# Patient Record
Sex: FEMALE | Race: WHITE | NOT HISPANIC OR LATINO | Employment: PART TIME | URBAN - METROPOLITAN AREA
[De-identification: names, ages, dates, MRNs, and addresses within clinical notes are randomized per-mention and may not be internally consistent; named-entity substitution may affect disease eponyms.]

---

## 2017-01-13 ENCOUNTER — ROUTINE PRENATAL (OUTPATIENT)
Dept: OBSTETRICS AND GYNECOLOGY | Facility: CLINIC | Age: 32
End: 2017-01-13

## 2017-01-13 ENCOUNTER — PROCEDURE VISIT (OUTPATIENT)
Dept: OBSTETRICS AND GYNECOLOGY | Facility: CLINIC | Age: 32
End: 2017-01-13

## 2017-01-13 VITALS — SYSTOLIC BLOOD PRESSURE: 110 MMHG | WEIGHT: 158 LBS | DIASTOLIC BLOOD PRESSURE: 60 MMHG | BODY MASS INDEX: 25.12 KG/M2

## 2017-01-13 VITALS — BODY MASS INDEX: 25.12 KG/M2 | SYSTOLIC BLOOD PRESSURE: 110 MMHG | DIASTOLIC BLOOD PRESSURE: 60 MMHG | WEIGHT: 158 LBS

## 2017-01-13 DIAGNOSIS — O09.293 HISTORY OF SHOULDER DYSTOCIA IN PRIOR PREGNANCY, CURRENTLY PREGNANT IN THIRD TRIMESTER: ICD-10-CM

## 2017-01-13 DIAGNOSIS — O09.892 SHORT INTERVAL BETWEEN PREGNANCIES AFFECTING PREGNANCY IN SECOND TRIMESTER, ANTEPARTUM: ICD-10-CM

## 2017-01-13 DIAGNOSIS — Z98.891 PREVIOUS CESAREAN SECTION: ICD-10-CM

## 2017-01-13 DIAGNOSIS — Z34.92 ENCOUNTER FOR PREGNANCY RELATED EXAMINATION IN SECOND TRIMESTER: Primary | ICD-10-CM

## 2017-01-13 DIAGNOSIS — Z36.89 SCREENING, ANTENATAL, FOR FETAL ANATOMIC SURVEY: Primary | ICD-10-CM

## 2017-01-13 DIAGNOSIS — Z13.9 SCREENING: ICD-10-CM

## 2017-01-13 LAB
BILIRUB BLD-MCNC: NEGATIVE MG/DL
CLARITY, POC: CLEAR
COLOR UR: YELLOW
EXTERNAL GENETIC TESTING, MATERNAL BLOOD: NORMAL
GLUCOSE UR STRIP-MCNC: NEGATIVE MG/DL
KETONES UR QL: NEGATIVE
LEUKOCYTE EST, POC: ABNORMAL
NITRITE UR-MCNC: NEGATIVE MG/ML
PH UR: 7 [PH] (ref 5–8)
PROT UR STRIP-MCNC: NEGATIVE MG/DL
RBC # UR STRIP: NEGATIVE /UL
SP GR UR: 1.02 (ref 1–1.03)
UROBILINOGEN UR QL: NORMAL

## 2017-01-13 PROCEDURE — 76805 OB US >/= 14 WKS SNGL FETUS: CPT | Performed by: OBSTETRICS & GYNECOLOGY

## 2017-01-13 PROCEDURE — 81002 URINALYSIS NONAUTO W/O SCOPE: CPT | Performed by: OBSTETRICS & GYNECOLOGY

## 2017-01-13 PROCEDURE — 0502F SUBSEQUENT PRENATAL CARE: CPT | Performed by: OBSTETRICS & GYNECOLOGY

## 2017-01-13 NOTE — PROGRESS NOTES
See scanned ultrasound report.   17 weeks 4 days  Lo intrauterine pregnancy  Anatomy completed and normal  Gender undetermined at patient's request, sonographer reports normal genitalia  Abdominal circumference 77th percentile    Ann Mitchell MD

## 2017-01-13 NOTE — MR AVS SNAPSHOT
Candace Mustafa   2017 9:30 AM   Appointment    Dept Phone:  [phone]   Encounter #:  [id number]    Provider:  EARNEST ACKERMAN [place]   Department:  [facility]                Your Full Care Plan              Your Updated Medication List          This list is accurate as of: 17 10:08 AM.  Always use your most recent med list.                Prenatal Vitamins 28-0.8 MG tablet               Instructions     None    Patient Instructions History      Upcoming Appointments     Visit Type Date Time Department    ULTRASOUND 2017  9:30 AM [facility] [place]    OB FOLLOWUP 2017 10:15 AM [facility] [place]      MyChart Signup     [facility] [employer] allows you to send messages to your doctor, view your test results, renew your prescriptions, schedule appointments, and more. To sign up, go to [employer] and click on the Sign Up Now link in the New User? box. Enter your Easy Met[employer] Activation Code exactly as it appears below along with the last four digits of your Social Security Number and your Date of Birth () to complete the sign-up process. If you do not sign up before the expiration date, you must request a new code.    Easy [employer] Activation Code: [email or web address]  Expires: 2017  5:38 AM    If you have questions, you can email [email or web address] or call [phone] to talk to our Easy Metrics staff. Remember, Easy Metrics is NOT to be used for urgent needs. For medical emergencies, dial 911.               Other Info from Your Visit           Your Appointments     2017 10:15 AM EST   OB FOLLOWUP with Ann Mitchell MD   [facility] (--)    [address], [address]  [facility] [address] [phone]              Other Notes About Your Plan     Blood transfusion acceptable    Latex allergy no    Chart Epic    Flu vaccine no    Genetic screening decliined     Tdap:   04.6.16    Gender:  Male           Allergies     Sulfa Antibiotics  Rash      Vital Signs     Last Menstrual Period Smoking Status                09/12/2016 Never Smoker

## 2017-01-13 NOTE — PROGRESS NOTES
Cc/spider veins rt leg/sc  31-year-old  5 para 3013 at 17 weeks 4 days  Ultrasound for anatomy today completed and normal  Gender is secret  Previous  section for history of shoulder dystocia, plans repeat  Doing well good fetal movement  Follow-up 4 weeks  Requesting repeat  with Dr. Glendy Ritter

## 2017-01-13 NOTE — MR AVS SNAPSHOT
Candace Mustafa   2017 10:15 AM   Routine Prenatal    Dept Phone:  [phone]   Encounter #:  [id number]    Provider:  Ann Mitchell MD   Department:  [facility]                Your Full Care Plan              Your Updated Medication List          This list is accurate as of: 17 11:22 AM.  Always use your most recent med list.                Prenatal Vitamins 28-0.8 MG tablet               We Performed the Following     Maternal Screen 4     POC Urinalysis Dipstick       You Were Diagnosed With        Codes Comments    Encounter for pregnancy related examination in second trimester    -  Primary ICD-10-CM: Z34.82  ICD-9-CM: V22.1     History of shoulder dystocia in prior pregnancy, currently pregnant in third trimester     ICD-10-CM: O09.293  ICD-9-CM: V23.49     Previous  section     ICD-10-CM: Z98.891  ICD-9-CM: V45.89     Short interval between pregnancies affecting pregnancy in second trimester, antepartum     ICD-10-CM: O09.892  ICD-9-CM: V23.89     Screening     ICD-10-CM: Z13.9  ICD-9-CM: V82.9       Instructions     None    Patient Instructions History      Upcoming Appointments     Visit Type Date Time Department    [place]ASOUND 2017  9:30 AM [facility]    OB FOLLOWUP 2017 10:15 AM [facility] [employer] Signup     [facility] [employer] allows you to send messages to your doctor, view your test results, renew your prescriptions, schedule appointments, and more. To sign up, go to [employer] and click on the Sign Up Now link in the New User? box. Enter your Company[employer] Activation Code exactly as it appears below along with the last four digits of your Social Security Number and your Date of Birth () to complete the sign-up process. If you do not sign up before the expiration date, you must request a new code.    Company[employer] Activation Code: [email or web address]  Expires: 2017  5:38  AM    If you have questions, you can email [email or web address] or call [phone] to talk to our MyChart staff. Remember, MyChart is NOT to be used for urgent needs. For medical emergencies, dial 911.               Other Info from Your Visit           Other Notes About Your Plan     Blood transfusion acceptable    Latex allergy no    Chart Epic    Flu vaccine no    Genetic screening decliined     Tdap:  16    Gender:  Male           Allergies     Sulfa Antibiotics  Rash      Reason for Visit     Routine Prenatal Visit           Vital Signs     Blood Pressure Weight Last Menstrual Period Body Mass Index Smoking Status       110/60 158 lb (71.7 kg) 2016 25.12 kg/m2 Never Smoker       Problems and Diagnoses Noted     Encounter for pregnancy related examination in second trimester    History of shoulder dystocia in prior pregnancy, currently pregnant in third trimester    Previous  ( delivery)    Short interval between pregnancies affecting pregnancy in second trimester, antepartum    Screening          Results     POC Urinalysis Dipstick      Component Value Standard Range & Units    Color Yellow Yellow, Straw, Dark Yellow, Graciela    Clarity, UA Clear Clear    Glucose, UA Negative Negative, 1000 mg/dL (3+) mg/dL    Bilirubin Negative Negative    Ketones, UA Negative Negative    Specific Gravity  1.025 1.005 - 1.030    Blood, UA Negative Negative    pH, Urine 7.0 5.0 - 8.0    Protein, POC Negative Negative mg/dL    Urobilinogen, UA Normal Normal    Leukocytes Trace Negative    Nitrite, UA Negative Negative                Maternal Screen 4      Component    External Genetic Testing, Maternal Blood    declined

## 2017-02-09 ENCOUNTER — ROUTINE PRENATAL (OUTPATIENT)
Dept: OBSTETRICS AND GYNECOLOGY | Facility: CLINIC | Age: 32
End: 2017-02-09

## 2017-02-09 VITALS — SYSTOLIC BLOOD PRESSURE: 110 MMHG | WEIGHT: 164 LBS | DIASTOLIC BLOOD PRESSURE: 60 MMHG | BODY MASS INDEX: 26.07 KG/M2

## 2017-02-09 DIAGNOSIS — O09.892 SHORT INTERVAL BETWEEN PREGNANCIES AFFECTING PREGNANCY IN SECOND TRIMESTER, ANTEPARTUM: ICD-10-CM

## 2017-02-09 DIAGNOSIS — Z34.82 ENCOUNTER FOR SUPERVISION OF OTHER NORMAL PREGNANCY IN SECOND TRIMESTER: Primary | ICD-10-CM

## 2017-02-09 DIAGNOSIS — Z13.9 SCREENING: ICD-10-CM

## 2017-02-09 DIAGNOSIS — Z98.891 PREVIOUS CESAREAN SECTION: ICD-10-CM

## 2017-02-09 DIAGNOSIS — O09.293 HISTORY OF SHOULDER DYSTOCIA IN PRIOR PREGNANCY, CURRENTLY PREGNANT IN THIRD TRIMESTER: ICD-10-CM

## 2017-02-09 PROBLEM — Z34.90 SUPERVISION OF NORMAL PREGNANCY: Status: ACTIVE | Noted: 2017-02-09

## 2017-02-09 LAB
BILIRUB BLD-MCNC: NEGATIVE MG/DL
CLARITY, POC: CLEAR
COLOR UR: YELLOW
GLUCOSE UR STRIP-MCNC: NEGATIVE MG/DL
KETONES UR QL: NEGATIVE
LEUKOCYTE EST, POC: ABNORMAL
NITRITE UR-MCNC: NEGATIVE MG/ML
PH UR: 7 [PH] (ref 5–8)
PROT UR STRIP-MCNC: NEGATIVE MG/DL
RBC # UR STRIP: NEGATIVE /UL
SP GR UR: 1.01 (ref 1–1.03)
UROBILINOGEN UR QL: NORMAL

## 2017-02-09 PROCEDURE — 81002 URINALYSIS NONAUTO W/O SCOPE: CPT | Performed by: OBSTETRICS & GYNECOLOGY

## 2017-02-09 PROCEDURE — 0502F SUBSEQUENT PRENATAL CARE: CPT | Performed by: OBSTETRICS & GYNECOLOGY

## 2017-02-09 NOTE — PROGRESS NOTES
Cc/no/c/o/sc    Patient without complaints.  Discussed call rotation.    Patient with a prior  for shoulder dystocia.  Discussed scheduling of the section    Patient requests .  Case request submitted.  Discussed provider preference and patient reports absolutely no specific preference for specific provider

## 2017-03-07 ENCOUNTER — ROUTINE PRENATAL (OUTPATIENT)
Dept: OBSTETRICS AND GYNECOLOGY | Facility: CLINIC | Age: 32
End: 2017-03-07

## 2017-03-07 VITALS — DIASTOLIC BLOOD PRESSURE: 68 MMHG | SYSTOLIC BLOOD PRESSURE: 110 MMHG | WEIGHT: 171 LBS | BODY MASS INDEX: 27.19 KG/M2

## 2017-03-07 DIAGNOSIS — Z98.891 PREVIOUS CESAREAN SECTION: ICD-10-CM

## 2017-03-07 DIAGNOSIS — Z34.82 PRENATAL CARE, SUBSEQUENT PREGNANCY, SECOND TRIMESTER: Primary | ICD-10-CM

## 2017-03-07 DIAGNOSIS — O09.293 HISTORY OF SHOULDER DYSTOCIA IN PRIOR PREGNANCY, CURRENTLY PREGNANT IN THIRD TRIMESTER: ICD-10-CM

## 2017-03-07 DIAGNOSIS — Z13.9 SCREENING: ICD-10-CM

## 2017-03-07 PROCEDURE — 81002 URINALYSIS NONAUTO W/O SCOPE: CPT | Performed by: OBSTETRICS & GYNECOLOGY

## 2017-03-07 PROCEDURE — 0502F SUBSEQUENT PRENATAL CARE: CPT | Performed by: OBSTETRICS & GYNECOLOGY

## 2017-03-07 NOTE — PROGRESS NOTES
Pt states no c/o  Previous cesaren section - RLTCS scheduled with Dr. Castellanos  Declines tubal   PTL warnings   3 weeks for one-hour gtt

## 2017-03-31 ENCOUNTER — ROUTINE PRENATAL (OUTPATIENT)
Dept: OBSTETRICS AND GYNECOLOGY | Facility: CLINIC | Age: 32
End: 2017-03-31

## 2017-03-31 VITALS — WEIGHT: 175 LBS | BODY MASS INDEX: 27.82 KG/M2 | DIASTOLIC BLOOD PRESSURE: 54 MMHG | SYSTOLIC BLOOD PRESSURE: 100 MMHG

## 2017-03-31 DIAGNOSIS — Z98.891 PREVIOUS CESAREAN SECTION: ICD-10-CM

## 2017-03-31 DIAGNOSIS — O09.293 HISTORY OF SHOULDER DYSTOCIA IN PRIOR PREGNANCY, CURRENTLY PREGNANT IN THIRD TRIMESTER: ICD-10-CM

## 2017-03-31 DIAGNOSIS — Z13.0 SCREENING FOR IRON DEFICIENCY ANEMIA: ICD-10-CM

## 2017-03-31 DIAGNOSIS — Z13.1 SCREENING FOR DIABETES MELLITUS: ICD-10-CM

## 2017-03-31 DIAGNOSIS — Z34.83 ENCOUNTER FOR SUPERVISION OF OTHER NORMAL PREGNANCY IN THIRD TRIMESTER: Primary | ICD-10-CM

## 2017-03-31 DIAGNOSIS — O09.892 SHORT INTERVAL BETWEEN PREGNANCIES AFFECTING PREGNANCY IN SECOND TRIMESTER, ANTEPARTUM: ICD-10-CM

## 2017-03-31 DIAGNOSIS — Z13.9 SCREENING: ICD-10-CM

## 2017-03-31 LAB
BILIRUB BLD-MCNC: NEGATIVE MG/DL
CLARITY, POC: CLEAR
COLOR UR: YELLOW
GLUCOSE 1H P 50 G GLC PO SERPL-MCNC: 86 MG/DL (ref 65–139)
GLUCOSE UR STRIP-MCNC: NEGATIVE MG/DL
HCT VFR BLD AUTO: 35.9 % (ref 35.6–45.5)
HGB BLD-MCNC: 12 G/DL (ref 11.9–15.5)
KETONES UR QL: NEGATIVE
LEUKOCYTE EST, POC: ABNORMAL
NITRITE UR-MCNC: NEGATIVE MG/ML
PH UR: 7 [PH] (ref 5–8)
PROT UR STRIP-MCNC: ABNORMAL MG/DL
RBC # UR STRIP: NEGATIVE /UL
SP GR UR: 1.02 (ref 1–1.03)
UROBILINOGEN UR QL: NORMAL

## 2017-03-31 PROCEDURE — 90715 TDAP VACCINE 7 YRS/> IM: CPT | Performed by: OBSTETRICS & GYNECOLOGY

## 2017-03-31 PROCEDURE — 0502F SUBSEQUENT PRENATAL CARE: CPT | Performed by: OBSTETRICS & GYNECOLOGY

## 2017-03-31 PROCEDURE — 90471 IMMUNIZATION ADMIN: CPT | Performed by: OBSTETRICS & GYNECOLOGY

## 2017-03-31 PROCEDURE — 81002 URINALYSIS NONAUTO W/O SCOPE: CPT | Performed by: OBSTETRICS & GYNECOLOGY

## 2017-03-31 NOTE — PROGRESS NOTES
Cc/ glucose h/h  Tdap/ today no/c/o    Patient essentially feeling well without complaints.  Some pelvic pressure and increased lower extremity varicosities.  Encourage daily aspirin.

## 2017-04-03 ENCOUNTER — TELEPHONE (OUTPATIENT)
Dept: OBSTETRICS AND GYNECOLOGY | Facility: CLINIC | Age: 32
End: 2017-04-03

## 2017-04-03 NOTE — TELEPHONE ENCOUNTER
----- Message from Stew Castellanos MD sent at 3/31/2017 10:36 PM EDT -----  Call pt:  1 hour GTT is Negative

## 2017-04-11 ENCOUNTER — ROUTINE PRENATAL (OUTPATIENT)
Dept: OBSTETRICS AND GYNECOLOGY | Facility: CLINIC | Age: 32
End: 2017-04-11

## 2017-04-11 VITALS — BODY MASS INDEX: 28.14 KG/M2 | DIASTOLIC BLOOD PRESSURE: 62 MMHG | SYSTOLIC BLOOD PRESSURE: 104 MMHG | WEIGHT: 177 LBS

## 2017-04-11 DIAGNOSIS — O09.892 SHORT INTERVAL BETWEEN PREGNANCIES AFFECTING PREGNANCY IN SECOND TRIMESTER, ANTEPARTUM: ICD-10-CM

## 2017-04-11 DIAGNOSIS — Z13.9 SCREENING: ICD-10-CM

## 2017-04-11 DIAGNOSIS — Z98.891 PREVIOUS CESAREAN SECTION: ICD-10-CM

## 2017-04-11 DIAGNOSIS — O09.293 HISTORY OF SHOULDER DYSTOCIA IN PRIOR PREGNANCY, CURRENTLY PREGNANT IN THIRD TRIMESTER: ICD-10-CM

## 2017-04-11 DIAGNOSIS — Z34.83 PRENATAL CARE, SUBSEQUENT PREGNANCY, THIRD TRIMESTER: Primary | ICD-10-CM

## 2017-04-11 LAB
BILIRUB BLD-MCNC: NEGATIVE MG/DL
CLARITY, POC: CLEAR
COLOR UR: YELLOW
GLUCOSE UR STRIP-MCNC: NEGATIVE MG/DL
KETONES UR QL: ABNORMAL
LEUKOCYTE EST, POC: NEGATIVE
NITRITE UR-MCNC: NEGATIVE MG/ML
PH UR: 7.5 [PH] (ref 5–8)
PROT UR STRIP-MCNC: NEGATIVE MG/DL
RBC # UR STRIP: ABNORMAL /UL
SP GR UR: 1.02 (ref 1–1.03)
UROBILINOGEN UR QL: NORMAL

## 2017-04-11 PROCEDURE — 81002 URINALYSIS NONAUTO W/O SCOPE: CPT | Performed by: OBSTETRICS & GYNECOLOGY

## 2017-04-11 PROCEDURE — 0502F SUBSEQUENT PRENATAL CARE: CPT | Performed by: OBSTETRICS & GYNECOLOGY

## 2017-04-11 NOTE — PROGRESS NOTES
pt states significant spider veins in her legs and states has a couple of vericose veins on legs and labia- rec compression devices   Previous csection - RLTCS 6/12/17  PTL warnings   2 weeks

## 2017-04-28 ENCOUNTER — ROUTINE PRENATAL (OUTPATIENT)
Dept: OBSTETRICS AND GYNECOLOGY | Facility: CLINIC | Age: 32
End: 2017-04-28

## 2017-04-28 VITALS — BODY MASS INDEX: 28.78 KG/M2 | SYSTOLIC BLOOD PRESSURE: 98 MMHG | DIASTOLIC BLOOD PRESSURE: 60 MMHG | WEIGHT: 181 LBS

## 2017-04-28 DIAGNOSIS — O09.293 HISTORY OF SHOULDER DYSTOCIA IN PRIOR PREGNANCY, CURRENTLY PREGNANT IN THIRD TRIMESTER: ICD-10-CM

## 2017-04-28 DIAGNOSIS — Z13.9 SCREENING: ICD-10-CM

## 2017-04-28 DIAGNOSIS — O09.892 SHORT INTERVAL BETWEEN PREGNANCIES AFFECTING PREGNANCY IN SECOND TRIMESTER, ANTEPARTUM: ICD-10-CM

## 2017-04-28 DIAGNOSIS — Z34.83 ENCOUNTER FOR SUPERVISION OF OTHER NORMAL PREGNANCY IN THIRD TRIMESTER: Primary | ICD-10-CM

## 2017-04-28 DIAGNOSIS — Z98.891 PREVIOUS CESAREAN SECTION: ICD-10-CM

## 2017-04-28 LAB
BILIRUB BLD-MCNC: NEGATIVE MG/DL
CLARITY, POC: CLEAR
COLOR UR: YELLOW
GLUCOSE UR STRIP-MCNC: NEGATIVE MG/DL
KETONES UR QL: NEGATIVE
LEUKOCYTE EST, POC: ABNORMAL
NITRITE UR-MCNC: NEGATIVE MG/ML
PH UR: 7.5 [PH] (ref 5–8)
PROT UR STRIP-MCNC: NEGATIVE MG/DL
RBC # UR STRIP: NEGATIVE /UL
SP GR UR: 1.02 (ref 1–1.03)
UROBILINOGEN UR QL: NORMAL

## 2017-04-28 PROCEDURE — 81002 URINALYSIS NONAUTO W/O SCOPE: CPT | Performed by: OBSTETRICS & GYNECOLOGY

## 2017-04-28 PROCEDURE — 0502F SUBSEQUENT PRENATAL CARE: CPT | Performed by: OBSTETRICS & GYNECOLOGY

## 2017-04-28 NOTE — PROGRESS NOTES
Cc/ Varicose veins in legs and sciatica pain in lower legs worse in Rt  Leg/sc    Patient with complaint of her cousin veins and sciatica the symptoms are mostly stable.  The fetus is active without vaginal bleeding or leakage of fluid.  Repeat  scheduled for 17

## 2017-05-11 ENCOUNTER — PROCEDURE VISIT (OUTPATIENT)
Dept: OBSTETRICS AND GYNECOLOGY | Facility: CLINIC | Age: 32
End: 2017-05-11

## 2017-05-11 ENCOUNTER — ROUTINE PRENATAL (OUTPATIENT)
Dept: OBSTETRICS AND GYNECOLOGY | Facility: CLINIC | Age: 32
End: 2017-05-11

## 2017-05-11 VITALS — SYSTOLIC BLOOD PRESSURE: 100 MMHG | DIASTOLIC BLOOD PRESSURE: 60 MMHG | BODY MASS INDEX: 29.41 KG/M2 | WEIGHT: 185 LBS

## 2017-05-11 DIAGNOSIS — O09.892 SHORT INTERVAL BETWEEN PREGNANCIES AFFECTING PREGNANCY IN SECOND TRIMESTER, ANTEPARTUM: ICD-10-CM

## 2017-05-11 DIAGNOSIS — Z13.9 SCREENING: Primary | ICD-10-CM

## 2017-05-11 DIAGNOSIS — O09.293 HISTORY OF SHOULDER DYSTOCIA IN PRIOR PREGNANCY, CURRENTLY PREGNANT IN THIRD TRIMESTER: ICD-10-CM

## 2017-05-11 DIAGNOSIS — Z34.83 PRENATAL CARE, SUBSEQUENT PREGNANCY, THIRD TRIMESTER: ICD-10-CM

## 2017-05-11 LAB
BILIRUB BLD-MCNC: NEGATIVE MG/DL
CLARITY, POC: CLEAR
COLOR UR: YELLOW
GLUCOSE UR STRIP-MCNC: NEGATIVE MG/DL
KETONES UR QL: NEGATIVE
LEUKOCYTE EST, POC: NORMAL
NITRITE UR-MCNC: NEGATIVE MG/ML
PH UR: 8 [PH] (ref 5–8)
PROT UR STRIP-MCNC: NEGATIVE MG/DL
RBC # UR STRIP: NEGATIVE /UL
SP GR UR: 1.02 (ref 1–1.03)
UROBILINOGEN UR QL: NORMAL

## 2017-05-11 PROCEDURE — 0502F SUBSEQUENT PRENATAL CARE: CPT | Performed by: NURSE PRACTITIONER

## 2017-05-11 PROCEDURE — 76816 OB US FOLLOW-UP PER FETUS: CPT | Performed by: OBSTETRICS & GYNECOLOGY

## 2017-05-11 PROCEDURE — 81002 URINALYSIS NONAUTO W/O SCOPE: CPT | Performed by: NURSE PRACTITIONER

## 2017-05-30 ENCOUNTER — ROUTINE PRENATAL (OUTPATIENT)
Dept: OBSTETRICS AND GYNECOLOGY | Facility: CLINIC | Age: 32
End: 2017-05-30

## 2017-05-30 VITALS — DIASTOLIC BLOOD PRESSURE: 68 MMHG | WEIGHT: 186 LBS | SYSTOLIC BLOOD PRESSURE: 106 MMHG | BODY MASS INDEX: 29.57 KG/M2

## 2017-05-30 DIAGNOSIS — Z36.85 ANTENATAL SCREENING FOR STREPTOCOCCUS B: ICD-10-CM

## 2017-05-30 DIAGNOSIS — Z98.891 PREVIOUS CESAREAN SECTION: ICD-10-CM

## 2017-05-30 DIAGNOSIS — Z13.9 SCREENING: ICD-10-CM

## 2017-05-30 DIAGNOSIS — O09.892 SHORT INTERVAL BETWEEN PREGNANCIES AFFECTING PREGNANCY IN SECOND TRIMESTER, ANTEPARTUM: ICD-10-CM

## 2017-05-30 DIAGNOSIS — O09.293 HISTORY OF SHOULDER DYSTOCIA IN PRIOR PREGNANCY, CURRENTLY PREGNANT IN THIRD TRIMESTER: ICD-10-CM

## 2017-05-30 DIAGNOSIS — Z34.83 PRENATAL CARE, SUBSEQUENT PREGNANCY, THIRD TRIMESTER: Primary | ICD-10-CM

## 2017-05-30 LAB
BILIRUB BLD-MCNC: NEGATIVE MG/DL
CLARITY, POC: CLEAR
COLOR UR: YELLOW
GLUCOSE UR STRIP-MCNC: NEGATIVE MG/DL
KETONES UR QL: NEGATIVE
LEUKOCYTE EST, POC: NEGATIVE
NITRITE UR-MCNC: NEGATIVE MG/ML
PH UR: 7 [PH] (ref 5–8)
PROT UR STRIP-MCNC: NEGATIVE MG/DL
RBC # UR STRIP: NEGATIVE /UL
SP GR UR: 1.01 (ref 1–1.03)
UROBILINOGEN UR QL: NORMAL

## 2017-05-30 PROCEDURE — 0502F SUBSEQUENT PRENATAL CARE: CPT | Performed by: OBSTETRICS & GYNECOLOGY

## 2017-05-30 PROCEDURE — 81002 URINALYSIS NONAUTO W/O SCOPE: CPT | Performed by: OBSTETRICS & GYNECOLOGY

## 2017-06-03 PROBLEM — O99.820 GROUP B STREPTOCOCCUS CARRIER, +RV CULTURE, CURRENTLY PREGNANT: Status: ACTIVE | Noted: 2017-06-03

## 2017-06-03 LAB — B-HEM STREP SPEC QL CULT: POSITIVE

## 2017-06-05 ENCOUNTER — TELEPHONE (OUTPATIENT)
Dept: OBSTETRICS AND GYNECOLOGY | Facility: CLINIC | Age: 32
End: 2017-06-05

## 2017-06-05 NOTE — TELEPHONE ENCOUNTER
Called and LM informing pt of pos GBS results. Explained that all this means is that when she goes into labor she will get an antibiotic. Instructed pt to call back with any questions or concerns.

## 2017-06-05 NOTE — TELEPHONE ENCOUNTER
----- Message from Glendy Ritter MD sent at 6/3/2017  4:23 PM EDT -----  Please call patient and notify of positive results of GBS

## 2017-06-07 ENCOUNTER — ROUTINE PRENATAL (OUTPATIENT)
Dept: OBSTETRICS AND GYNECOLOGY | Facility: CLINIC | Age: 32
End: 2017-06-07

## 2017-06-07 VITALS — SYSTOLIC BLOOD PRESSURE: 110 MMHG | DIASTOLIC BLOOD PRESSURE: 60 MMHG | WEIGHT: 189 LBS | BODY MASS INDEX: 30.05 KG/M2

## 2017-06-07 DIAGNOSIS — Z98.891 PREVIOUS CESAREAN SECTION: ICD-10-CM

## 2017-06-07 DIAGNOSIS — O99.820 GROUP B STREPTOCOCCUS CARRIER, +RV CULTURE, CURRENTLY PREGNANT: ICD-10-CM

## 2017-06-07 DIAGNOSIS — O09.293 HISTORY OF SHOULDER DYSTOCIA IN PRIOR PREGNANCY, CURRENTLY PREGNANT IN THIRD TRIMESTER: ICD-10-CM

## 2017-06-07 DIAGNOSIS — O09.892 SHORT INTERVAL BETWEEN PREGNANCIES AFFECTING PREGNANCY IN SECOND TRIMESTER, ANTEPARTUM: ICD-10-CM

## 2017-06-07 DIAGNOSIS — Z13.9 SCREENING: ICD-10-CM

## 2017-06-07 DIAGNOSIS — Z34.83 ENCOUNTER FOR SUPERVISION OF OTHER NORMAL PREGNANCY IN THIRD TRIMESTER: Primary | ICD-10-CM

## 2017-06-07 PROCEDURE — 0502F SUBSEQUENT PRENATAL CARE: CPT | Performed by: OBSTETRICS & GYNECOLOGY

## 2017-06-07 PROCEDURE — 81002 URINALYSIS NONAUTO W/O SCOPE: CPT | Performed by: OBSTETRICS & GYNECOLOGY

## 2017-06-07 NOTE — PROGRESS NOTES
CC/ SOME EDMA IN FEET/SC    Patient with complain of some lower extremity edema otherwise feeling well.  Few contractions with an active fetus.   scheduled for Monday

## 2017-06-11 NOTE — H&P
[facility]  History & Physical    2017    Patient: Candace Mustafa          MR#:[id number]    Chief complaint: here for RCS    Subjective     Patient is a 32 y.o. female  at 38w6d presents for scheduled repeat  section.  Contributing history is prior  for shoulder dystocia.  The patient report feeling well with no complaints.       Prenatal care complicated by items listed in the problem list below    Patient Active Problem List   Diagnosis   • History of shoulder dystocia in prior pregnancy, currently pregnant in third trimester   • Previous  section   • Short interval between pregnancies affecting pregnancy in second trimester, antepartum   • Supervision of normal pregnancy   • Group B Streptococcus carrier, +RV culture, currently pregnant       Past Medical History:   Diagnosis Date   • Anemia        Past Surgical History:   Procedure Laterality Date   •  SECTION Bilateral 2016    Procedure:  SECTION PRIMARY;  Surgeon: Ann Mitchell MD;  Location: [facility] DELIVERY;  Service:    • DILATATION AND CURETTAGE     • WISDOM TOOTH EXTRACTION         Obstetric History:  OB History      Para Term  AB TAB SAB Ectopic Multiple Living    4 3 3 0 1 0 1 0 0 3        Obstetric Comments    16 ultrasound 9 weeks 2 days, ruiz, viable intrauterine pregnancy, estimated date of confinement 17 by scan, 17 by LMP. JKB  17 ultrasound 17 weeks 4 days, normal, complete anatomic survey, abdominal circumference 77th percentil e.JKB  5.11.17 - 34-3 weeks - EFW 68%, AC 87% -JHF          Menstrual History:     Patient's last menstrual period was 2016.       #: 1, Date: 12, Sex: None, Weight: None, GA: 9w0d, Delivery: None, Apgar1: None, Apgar5: None, Living: None, Birth Comments: with D&C    #: 2, Date: 01/15/13, Sex: Female, Weight: 8 lb 2 oz (3.685 kg), GA: 40w0d, Delivery: Vaginal, Spontaneous Delivery,  Apgar1: 8, Apgar5: 9, Living: Yes, Birth Comments: no [id number]    #: 3, Date: 14, Sex: Female, Weight: 8 lb 7 oz (3.827 kg), GA: 38w1d, Delivery: None, Apgar1: 7, Apgar5: 9, Living: Yes, Birth Comments: Shoulder dystocia    #: 4, Date: 16, Sex: Male, Weight: 9 lb 6.1 oz (4.255 kg), GA: 39w2d, Delivery: , Low Transverse, Apgar1: 8, Apgar5: 8, Living: Yes, Birth Comments: No complications JKB      Family History   Problem Relation Age of Onset   • No Known Problems Mother    • No Known Problems Father    • No Known Problems Son    • No Known Problems Daughter    • Dementia Maternal Grandmother    • No Known Problems Daughter        Social History   Substance Use Topics   • Smoking status: Never Smoker   • Smokeless tobacco: Never Used   • Alcohol use No       Sulfa antibiotics    No current facility-administered medications for this encounter.     Current Outpatient Prescriptions:   •  Prenatal Vit-Fe Fumarate-FA (PRENATAL VITAMINS) 28-0.8 MG tablet, Take  by mouth., Disp: , Rfl:     Review of Systems  Review of Systems   Constitutional: Negative.    Respiratory: Negative.    Cardiovascular: Negative.    Gastrointestinal: Negative.    Genitourinary: Negative.    Psychiatric/Behavioral: Negative.      Objective     Vital Signs       Physical Exam:  Physical Exam   Constitutional: She is oriented to person, place, and time. She appears well-developed and well-nourished.   Cardiovascular: Normal rate and regular rhythm.    Pulmonary/Chest: Effort normal and breath sounds normal.   Abdominal: Soft. Bowel sounds are normal. She exhibits no distension and no mass. There is no tenderness.   Genitourinary: Vagina normal and uterus normal.   Genitourinary Comments: Gravid uterus    Neurological: She is alert and oriented to person, place, and time.   Psychiatric: She has a normal mood and affect. Her behavior is normal. Judgment and thought content normal.   Nursing note and vitals  reviewed.      Labs:  Pending     Results Review:   I reviewed the patient's new clinical results.    Assessment/Plan     1. Intrauterine Pregnancy at 39w0d  2. Prior  for repeat  section        Active Problems:    * No active hospital problems. *      Reviewed the surgical procedure with patient.  I discussed the risks including but not limited to bleeding, infection and damage to internal organs. Understanding of the procedure is voiced.     Stew Castellanos MD  17  7:48 PM      Patient Care Team:  No Known Provider as PCP - General

## 2017-06-12 ENCOUNTER — ANESTHESIA EVENT (OUTPATIENT)
Dept: LABOR AND DELIVERY | Facility: HOSPITAL | Age: 32
End: 2017-06-12

## 2017-06-12 ENCOUNTER — ANESTHESIA (OUTPATIENT)
Dept: LABOR AND DELIVERY | Facility: HOSPITAL | Age: 32
End: 2017-06-12

## 2017-06-12 ENCOUNTER — HOSPITAL ENCOUNTER (INPATIENT)
Facility: HOSPITAL | Age: 32
LOS: 2 days | Discharge: HOME | End: 2017-06-14
Attending: OBSTETRICS & GYNECOLOGY | Admitting: OBSTETRICS & GYNECOLOGY

## 2017-06-12 PROBLEM — Z34.90 PREGNANCY: Status: ACTIVE | Noted: 2017-06-12

## 2017-06-12 PROBLEM — O34.593 UTERINE ABNORMALITY IN PREGNANCY IN THIRD TRIMESTER: Status: ACTIVE | Noted: 2017-06-12

## 2017-06-12 LAB
ABO GROUP BLD: NORMAL
ATMOSPHERIC PRESS: 757.1 MMHG
BASE EXCESS BLDCOA CALC-SCNC: -3 MMOL/L
BASOPHILS # BLD AUTO: 0.02 10*3/MM3 (ref 0–0.2)
BASOPHILS NFR BLD AUTO: 0.2 % (ref 0–1.5)
BDY SITE: ABNORMAL
BLD GP AB SCN SERPL QL: NEGATIVE
DEPRECATED RDW RBC AUTO: 45.7 FL (ref 37–54)
EOSINOPHIL # BLD AUTO: 0.16 10*3/MM3 (ref 0–0.7)
EOSINOPHIL NFR BLD AUTO: 1.7 % (ref 0.3–6.2)
ERYTHROCYTE [DISTWIDTH] IN BLOOD BY AUTOMATED COUNT: 13.2 % (ref 11.7–13)
HCO3 BLDCOA-SCNC: 25.8 MMOL/L (ref 22–28)
HCT VFR BLD AUTO: 35.9 % (ref 35.6–45.5)
HGB BLD-MCNC: 12.2 G/DL (ref 11.9–15.5)
IMM GRANULOCYTES # BLD: 0.08 10*3/MM3 (ref 0–0.03)
IMM GRANULOCYTES NFR BLD: 0.8 % (ref 0–0.5)
LYMPHOCYTES # BLD AUTO: 3 10*3/MM3 (ref 0.9–4.8)
LYMPHOCYTES NFR BLD AUTO: 31.1 % (ref 19.6–45.3)
MCH RBC QN AUTO: 32.1 PG (ref 26.9–32)
MCHC RBC AUTO-ENTMCNC: 34 G/DL (ref 32.4–36.3)
MCV RBC AUTO: 94.5 FL (ref 80.5–98.2)
MODALITY: ABNORMAL
MONOCYTES # BLD AUTO: 0.62 10*3/MM3 (ref 0.2–1.2)
MONOCYTES NFR BLD AUTO: 6.4 % (ref 5–12)
NEUTROPHILS # BLD AUTO: 5.76 10*3/MM3 (ref 1.9–8.1)
NEUTROPHILS NFR BLD AUTO: 59.8 % (ref 42.7–76)
PCO2 BLDCOA: 58.3 MMHG
PH BLDCOA: 7.25 [PH] (ref 7.18–7.34)
PLATELET # BLD AUTO: 155 10*3/MM3 (ref 140–500)
PMV BLD AUTO: 11.9 FL (ref 6–12)
PO2 BLDCOA: <22 MMHG (ref 12–26)
RBC # BLD AUTO: 3.8 10*6/MM3 (ref 3.9–5.2)
RH BLD: POSITIVE
SAO2 % BLDCOA: 16.1 % (ref 92–99)
WBC NRBC COR # BLD: 9.64 10*3/MM3 (ref 4.5–10.7)

## 2017-06-12 PROCEDURE — 86850 RBC ANTIBODY SCREEN: CPT | Performed by: OBSTETRICS & GYNECOLOGY

## 2017-06-12 PROCEDURE — 94799 UNLISTED PULMONARY SVC/PX: CPT

## 2017-06-12 PROCEDURE — 25010000002 FENTANYL CITRATE (PF) 100 MCG/2ML SOLUTION: Performed by: NURSE ANESTHETIST, CERTIFIED REGISTERED

## 2017-06-12 PROCEDURE — 25010000002 PROMETHAZINE PER 50 MG: Performed by: NURSE ANESTHETIST, CERTIFIED REGISTERED

## 2017-06-12 PROCEDURE — 25010000003 CEFAZOLIN IN DEXTROSE 2-4 GM/100ML-% SOLUTION: Performed by: OBSTETRICS & GYNECOLOGY

## 2017-06-12 PROCEDURE — 25010000002 PHENYLEPHRINE PER 1 ML: Performed by: NURSE ANESTHETIST, CERTIFIED REGISTERED

## 2017-06-12 PROCEDURE — 85025 COMPLETE CBC W/AUTO DIFF WBC: CPT | Performed by: OBSTETRICS & GYNECOLOGY

## 2017-06-12 PROCEDURE — 86901 BLOOD TYPING SEROLOGIC RH(D): CPT | Performed by: OBSTETRICS & GYNECOLOGY

## 2017-06-12 PROCEDURE — S0260 H&P FOR SURGERY: HCPCS | Performed by: OBSTETRICS & GYNECOLOGY

## 2017-06-12 PROCEDURE — 82803 BLOOD GASES ANY COMBINATION: CPT

## 2017-06-12 PROCEDURE — 25010000002 ONDANSETRON PER 1 MG: Performed by: NURSE ANESTHETIST, CERTIFIED REGISTERED

## 2017-06-12 PROCEDURE — 25010000002 MORPHINE PER 10 MG: Performed by: ANESTHESIOLOGY

## 2017-06-12 PROCEDURE — 59510 CESAREAN DELIVERY: CPT | Performed by: OBSTETRICS & GYNECOLOGY

## 2017-06-12 PROCEDURE — 86900 BLOOD TYPING SEROLOGIC ABO: CPT | Performed by: OBSTETRICS & GYNECOLOGY

## 2017-06-12 RX ORDER — HYDROXYZINE 50 MG/1
50 TABLET, FILM COATED ORAL EVERY 6 HOURS PRN
Status: DISCONTINUED | OUTPATIENT
Start: 2017-06-12 | End: 2017-06-14 | Stop reason: HOSPADM

## 2017-06-12 RX ORDER — DIPHENHYDRAMINE HYDROCHLORIDE 50 MG/ML
25 INJECTION INTRAMUSCULAR; INTRAVENOUS EVERY 4 HOURS PRN
Status: DISCONTINUED | OUTPATIENT
Start: 2017-06-12 | End: 2017-06-14 | Stop reason: HOSPADM

## 2017-06-12 RX ORDER — ERYTHROMYCIN 5 MG/G
OINTMENT OPHTHALMIC
Status: DISPENSED
Start: 2017-06-12 | End: 2017-06-12

## 2017-06-12 RX ORDER — SODIUM CHLORIDE 0.9 % (FLUSH) 0.9 %
1-10 SYRINGE (ML) INJECTION AS NEEDED
Status: DISCONTINUED | OUTPATIENT
Start: 2017-06-12 | End: 2017-06-12 | Stop reason: HOSPADM

## 2017-06-12 RX ORDER — ONDANSETRON 2 MG/ML
4 INJECTION INTRAMUSCULAR; INTRAVENOUS EVERY 6 HOURS PRN
Status: DISCONTINUED | OUTPATIENT
Start: 2017-06-12 | End: 2017-06-14 | Stop reason: HOSPADM

## 2017-06-12 RX ORDER — CALCIUM CARBONATE 200(500)MG
1 TABLET,CHEWABLE ORAL DAILY
COMMUNITY
End: 2017-06-20

## 2017-06-12 RX ORDER — PROMETHAZINE HYDROCHLORIDE 25 MG/ML
12.5 INJECTION, SOLUTION INTRAMUSCULAR; INTRAVENOUS EVERY 6 HOURS PRN
Status: DISCONTINUED | OUTPATIENT
Start: 2017-06-12 | End: 2017-06-14 | Stop reason: HOSPADM

## 2017-06-12 RX ORDER — DIPHENHYDRAMINE HCL 25 MG
25 CAPSULE ORAL EVERY 4 HOURS PRN
Status: DISCONTINUED | OUTPATIENT
Start: 2017-06-12 | End: 2017-06-14 | Stop reason: HOSPADM

## 2017-06-12 RX ORDER — OXYCODONE HYDROCHLORIDE AND ACETAMINOPHEN 5; 325 MG/1; MG/1
2 TABLET ORAL EVERY 4 HOURS PRN
Status: DISCONTINUED | OUTPATIENT
Start: 2017-06-12 | End: 2017-06-14 | Stop reason: HOSPADM

## 2017-06-12 RX ORDER — CARBOPROST TROMETHAMINE 250 UG/ML
250 INJECTION, SOLUTION INTRAMUSCULAR ONCE AS NEEDED
Status: DISCONTINUED | OUTPATIENT
Start: 2017-06-12 | End: 2017-06-14 | Stop reason: HOSPADM

## 2017-06-12 RX ORDER — FAMOTIDINE 10 MG/ML
20 INJECTION, SOLUTION INTRAVENOUS ONCE
Status: COMPLETED | OUTPATIENT
Start: 2017-06-12 | End: 2017-06-12

## 2017-06-12 RX ORDER — MISOPROSTOL 200 UG/1
800 TABLET ORAL AS NEEDED
Status: DISCONTINUED | OUTPATIENT
Start: 2017-06-12 | End: 2017-06-12 | Stop reason: HOSPADM

## 2017-06-12 RX ORDER — ONDANSETRON 2 MG/ML
4 INJECTION INTRAMUSCULAR; INTRAVENOUS ONCE AS NEEDED
Status: ACTIVE | OUTPATIENT
Start: 2017-06-12 | End: 2017-06-13

## 2017-06-12 RX ORDER — MISOPROSTOL 200 UG/1
600 TABLET ORAL ONCE AS NEEDED
Status: DISCONTINUED | OUTPATIENT
Start: 2017-06-12 | End: 2017-06-14 | Stop reason: HOSPADM

## 2017-06-12 RX ORDER — MORPHINE SULFATE 2 MG/ML
2 INJECTION, SOLUTION INTRAMUSCULAR; INTRAVENOUS
Status: ACTIVE | OUTPATIENT
Start: 2017-06-12 | End: 2017-06-13

## 2017-06-12 RX ORDER — METHYLERGONOVINE MALEATE 0.2 MG/ML
200 INJECTION INTRAVENOUS ONCE AS NEEDED
Status: DISCONTINUED | OUTPATIENT
Start: 2017-06-12 | End: 2017-06-14 | Stop reason: HOSPADM

## 2017-06-12 RX ORDER — BUPIVACAINE HYDROCHLORIDE 7.5 MG/ML
INJECTION, SOLUTION EPIDURAL; RETROBULBAR AS NEEDED
Status: DISCONTINUED | OUTPATIENT
Start: 2017-06-12 | End: 2017-06-12 | Stop reason: SURG

## 2017-06-12 RX ORDER — MORPHINE SULFATE 1 MG/ML
INJECTION, SOLUTION EPIDURAL; INTRATHECAL; INTRAVENOUS
Status: DISPENSED
Start: 2017-06-12 | End: 2017-06-12

## 2017-06-12 RX ORDER — MORPHINE SULFATE 1 MG/ML
INJECTION, SOLUTION EPIDURAL; INTRATHECAL; INTRAVENOUS AS NEEDED
Status: DISCONTINUED | OUTPATIENT
Start: 2017-06-12 | End: 2017-06-12 | Stop reason: SURG

## 2017-06-12 RX ORDER — BISACODYL 5 MG/1
10 TABLET, DELAYED RELEASE ORAL DAILY PRN
Status: DISCONTINUED | OUTPATIENT
Start: 2017-06-12 | End: 2017-06-14 | Stop reason: HOSPADM

## 2017-06-12 RX ORDER — SIMETHICONE 80 MG
80 TABLET,CHEWABLE ORAL 4 TIMES DAILY PRN
Status: DISCONTINUED | OUTPATIENT
Start: 2017-06-12 | End: 2017-06-14 | Stop reason: HOSPADM

## 2017-06-12 RX ORDER — IBUPROFEN 800 MG/1
800 TABLET ORAL EVERY 8 HOURS PRN
Status: DISCONTINUED | OUTPATIENT
Start: 2017-06-12 | End: 2017-06-14 | Stop reason: HOSPADM

## 2017-06-12 RX ORDER — ONDANSETRON 4 MG/1
4 TABLET, FILM COATED ORAL EVERY 8 HOURS PRN
Status: DISCONTINUED | OUTPATIENT
Start: 2017-06-12 | End: 2017-06-14 | Stop reason: HOSPADM

## 2017-06-12 RX ORDER — ONDANSETRON 2 MG/ML
INJECTION INTRAMUSCULAR; INTRAVENOUS AS NEEDED
Status: DISCONTINUED | OUTPATIENT
Start: 2017-06-12 | End: 2017-06-12 | Stop reason: SURG

## 2017-06-12 RX ORDER — LANOLIN 100 %
OINTMENT (GRAM) TOPICAL
Status: DISCONTINUED | OUTPATIENT
Start: 2017-06-12 | End: 2017-06-14 | Stop reason: HOSPADM

## 2017-06-12 RX ORDER — OXYCODONE AND ACETAMINOPHEN 7.5; 325 MG/1; MG/1
2 TABLET ORAL EVERY 4 HOURS PRN
Status: DISCONTINUED | OUTPATIENT
Start: 2017-06-12 | End: 2017-06-14 | Stop reason: HOSPADM

## 2017-06-12 RX ORDER — FENTANYL CITRATE 50 UG/ML
INJECTION, SOLUTION INTRAMUSCULAR; INTRAVENOUS AS NEEDED
Status: DISCONTINUED | OUTPATIENT
Start: 2017-06-12 | End: 2017-06-12 | Stop reason: SURG

## 2017-06-12 RX ORDER — PHYTONADIONE 2 MG/ML
INJECTION, EMULSION INTRAMUSCULAR; INTRAVENOUS; SUBCUTANEOUS
Status: DISPENSED
Start: 2017-06-12 | End: 2017-06-12

## 2017-06-12 RX ORDER — MAGNESIUM HYDROXIDE/ALUMINUM HYDROXICE/SIMETHICONE 120; 1200; 1200 MG/30ML; MG/30ML; MG/30ML
30 SUSPENSION ORAL EVERY 4 HOURS PRN
Status: DISCONTINUED | OUTPATIENT
Start: 2017-06-12 | End: 2017-06-14 | Stop reason: HOSPADM

## 2017-06-12 RX ORDER — CARBOPROST TROMETHAMINE 250 UG/ML
250 INJECTION, SOLUTION INTRAMUSCULAR AS NEEDED
Status: DISCONTINUED | OUTPATIENT
Start: 2017-06-12 | End: 2017-06-12 | Stop reason: HOSPADM

## 2017-06-12 RX ORDER — CALCIUM CARBONATE 200(500)MG
2 TABLET,CHEWABLE ORAL EVERY 6 HOURS PRN
Status: DISCONTINUED | OUTPATIENT
Start: 2017-06-12 | End: 2017-06-14 | Stop reason: HOSPADM

## 2017-06-12 RX ORDER — METHYLERGONOVINE MALEATE 0.2 MG/ML
200 INJECTION INTRAVENOUS ONCE AS NEEDED
Status: DISCONTINUED | OUTPATIENT
Start: 2017-06-12 | End: 2017-06-12 | Stop reason: HOSPADM

## 2017-06-12 RX ORDER — HYDROMORPHONE HYDROCHLORIDE 1 MG/ML
0.5 INJECTION, SOLUTION INTRAMUSCULAR; INTRAVENOUS; SUBCUTANEOUS
Status: DISCONTINUED | OUTPATIENT
Start: 2017-06-12 | End: 2017-06-12 | Stop reason: HOSPADM

## 2017-06-12 RX ORDER — SODIUM CHLORIDE, SODIUM LACTATE, POTASSIUM CHLORIDE, CALCIUM CHLORIDE 600; 310; 30; 20 MG/100ML; MG/100ML; MG/100ML; MG/100ML
125 INJECTION, SOLUTION INTRAVENOUS CONTINUOUS
Status: DISCONTINUED | OUTPATIENT
Start: 2017-06-12 | End: 2017-06-12

## 2017-06-12 RX ORDER — PROMETHAZINE HYDROCHLORIDE 25 MG/ML
INJECTION, SOLUTION INTRAMUSCULAR; INTRAVENOUS AS NEEDED
Status: DISCONTINUED | OUTPATIENT
Start: 2017-06-12 | End: 2017-06-12 | Stop reason: SURG

## 2017-06-12 RX ORDER — ACETAMINOPHEN 500 MG
1000 TABLET ORAL EVERY 6 HOURS PRN
Status: DISCONTINUED | OUTPATIENT
Start: 2017-06-12 | End: 2017-06-12

## 2017-06-12 RX ORDER — CEFAZOLIN SODIUM 2 G/100ML
2 INJECTION, SOLUTION INTRAVENOUS ONCE
Status: COMPLETED | OUTPATIENT
Start: 2017-06-12 | End: 2017-06-12

## 2017-06-12 RX ORDER — BISACODYL 10 MG
10 SUPPOSITORY, RECTAL RECTAL DAILY PRN
Status: DISCONTINUED | OUTPATIENT
Start: 2017-06-12 | End: 2017-06-14 | Stop reason: HOSPADM

## 2017-06-12 RX ORDER — OXYTOCIN/RINGER'S LACTATE 10/500ML
999 PLASTIC BAG, INJECTION (ML) INTRAVENOUS ONCE
Status: COMPLETED | OUTPATIENT
Start: 2017-06-12 | End: 2017-06-12

## 2017-06-12 RX ORDER — NALOXONE HCL 0.4 MG/ML
0.2 VIAL (ML) INJECTION
Status: DISCONTINUED | OUTPATIENT
Start: 2017-06-12 | End: 2017-06-14 | Stop reason: HOSPADM

## 2017-06-12 RX ORDER — LIDOCAINE HYDROCHLORIDE 10 MG/ML
5 INJECTION, SOLUTION INFILTRATION; PERINEURAL AS NEEDED
Status: DISCONTINUED | OUTPATIENT
Start: 2017-06-12 | End: 2017-06-12 | Stop reason: HOSPADM

## 2017-06-12 RX ORDER — OXYTOCIN/RINGER'S LACTATE 10/500ML
125 PLASTIC BAG, INJECTION (ML) INTRAVENOUS CONTINUOUS PRN
Status: DISCONTINUED | OUTPATIENT
Start: 2017-06-12 | End: 2017-06-12 | Stop reason: HOSPADM

## 2017-06-12 RX ADMIN — FAMOTIDINE 20 MG: 10 INJECTION, SOLUTION INTRAVENOUS at 07:15

## 2017-06-12 RX ADMIN — OXYTOCIN 10 UNITS: 10 INJECTION, SOLUTION INTRAMUSCULAR; INTRAVENOUS at 07:49

## 2017-06-12 RX ADMIN — FENTANYL CITRATE 25 MCG: 50 INJECTION, SOLUTION INTRAMUSCULAR; INTRAVENOUS at 08:07

## 2017-06-12 RX ADMIN — PROMETHAZINE HYDROCHLORIDE 6.25 MG: 25 INJECTION INTRAMUSCULAR; INTRAVENOUS at 08:05

## 2017-06-12 RX ADMIN — PHENYLEPHRINE HYDROCHLORIDE 50 MCG: 10 INJECTION INTRAVENOUS at 08:03

## 2017-06-12 RX ADMIN — BUPIVACAINE HYDROCHLORIDE 1.6 ML: 7.5 INJECTION, SOLUTION EPIDURAL; RETROBULBAR at 07:34

## 2017-06-12 RX ADMIN — PHENYLEPHRINE HYDROCHLORIDE 50 MCG: 10 INJECTION INTRAVENOUS at 08:19

## 2017-06-12 RX ADMIN — OXYCODONE HYDROCHLORIDE AND ACETAMINOPHEN 1 TABLET: 5; 325 TABLET ORAL at 12:15

## 2017-06-12 RX ADMIN — ONDANSETRON 4 MG: 2 INJECTION INTRAMUSCULAR; INTRAVENOUS at 07:35

## 2017-06-12 RX ADMIN — IBUPROFEN 800 MG: 800 TABLET ORAL at 20:24

## 2017-06-12 RX ADMIN — SODIUM CHLORIDE, POTASSIUM CHLORIDE, SODIUM LACTATE AND CALCIUM CHLORIDE 1000 ML: 600; 310; 30; 20 INJECTION, SOLUTION INTRAVENOUS at 06:00

## 2017-06-12 RX ADMIN — FENTANYL CITRATE 25 MCG: 50 INJECTION, SOLUTION INTRAMUSCULAR; INTRAVENOUS at 08:01

## 2017-06-12 RX ADMIN — CEFAZOLIN SODIUM 2 G: 2 INJECTION, SOLUTION INTRAVENOUS at 07:23

## 2017-06-12 RX ADMIN — PHENYLEPHRINE HYDROCHLORIDE 50 MCG: 10 INJECTION INTRAVENOUS at 08:01

## 2017-06-12 RX ADMIN — IBUPROFEN 800 MG: 800 TABLET ORAL at 12:16

## 2017-06-12 RX ADMIN — ACETAMINOPHEN 1000 MG: 500 TABLET ORAL at 07:15

## 2017-06-12 RX ADMIN — OXYTOCIN 10 UNITS: 10 INJECTION, SOLUTION INTRAMUSCULAR; INTRAVENOUS at 08:10

## 2017-06-12 RX ADMIN — PHENYLEPHRINE HYDROCHLORIDE 50 MCG: 10 INJECTION INTRAVENOUS at 07:57

## 2017-06-12 RX ADMIN — PHENYLEPHRINE HYDROCHLORIDE 50 MCG: 10 INJECTION INTRAVENOUS at 08:06

## 2017-06-12 RX ADMIN — MORPHINE SULFATE 0.25 MG: 1 INJECTION EPIDURAL; INTRATHECAL; INTRAVENOUS at 07:34

## 2017-06-12 RX ADMIN — PHENYLEPHRINE HYDROCHLORIDE 50 MCG: 10 INJECTION INTRAVENOUS at 07:35

## 2017-06-12 RX ADMIN — SODIUM CHLORIDE, POTASSIUM CHLORIDE, SODIUM LACTATE AND CALCIUM CHLORIDE 125 ML/HR: 600; 310; 30; 20 INJECTION, SOLUTION INTRAVENOUS at 06:56

## 2017-06-12 NOTE — L&D DELIVERY NOTE
[facility]   Section Operative Note    Pre-Operative Dx:   1.  IUP at 39w0d    2. Uterine window   Prior C/S    Postoperative dx:    1.  Same     Procedure: Procedure(s):   SECTION REPEAT   Surgeon/Assistant: Surgeon(s):  Stew Castellanos MD         Anesthesia:  Anesthesiologist: Choice  Anesthesiologist: Rafael oHlcomb MD  CRNA: Ellie Obrien CRNA     EBL: *No blood loss documented* mls.          IV Fluids: 1500 mls.   UOP: 300 mls.    I/O this shift:  In: 1000 [I.V.:1000]  Out: -    Antibiotics: cefazolin (Ancef)     Infant:      Name:  Jag    Infant's Name: Jag     Gender: male  infant    Weight: 7 lb 12.7 oz (3.535 kg)     Apgars:    @ 1 minute /        @ 5 minutes    Cord gases: Venous:  No results found for: PHCVEN     Arterial:    Lab Results   Component Value Date    PHCART 7.25 2017        Indication for C/Section:  Prior C/S          Priority for C/Section:  Routine      Procedure Details:   Uterine window       Complications:   None      Disposition:   Mother to Prior    in stable condition currently.   Baby to NBN  in stable condition currently.       Stew Castellanos MD  2017  8:30 AM

## 2017-06-12 NOTE — PLAN OF CARE
Problem: Patient Care Overview (Adult)  Goal: Plan of Care Review  Outcome: Ongoing (interventions implemented as appropriate)    17   Coping/Psychosocial Response Interventions   Plan Of Care Reviewed With patient;spouse   Patient Care Overview   Progress improving       Goal: Adult Individualization and Mutuality  Outcome: Ongoing (interventions implemented as appropriate)    17   Individualization   Patient Specific Preferences bottlefeeding         Problem:  Delivery (Adult,Obstetrics,Pediatric)  Goal: Signs and Symptoms of Listed Potential Problems Will be Absent or Manageable ( Delivery)  Outcome: Outcome(s) achieved Date Met:  17    Delivery   Problems Assessed ( Delivery) all   Problems Present ( Delivery) none

## 2017-06-12 NOTE — OP NOTE
[facility]     2017    Patient:Candace Mustafa   MR#:[id number]     Section Procedure Note    Indications: previous uterine incision low transverse    Pre-operative Diagnosis: Intrauterine pregnancy at 39w0d    Post-operative Diagnosis:   Previous  section   Uterine window     Procedure:  Repeat low transverse  section     Surgeon: Stew Castellanos MD     Assistants: Jane    Anesthesia: Spinal anesthesia    Prenatal care problem list:    Patient Active Problem List   Diagnosis   • History of shoulder dystocia in prior pregnancy, currently pregnant in third trimester   • Previous  section   • Short interval between pregnancies affecting pregnancy in second trimester, antepartum   • Supervision of normal pregnancy   • Group B Streptococcus carrier, +RV culture, currently pregnant   • Pregnancy   • Uterine abnormality in pregnancy in third trimester       Procedure Details   The patient was seen in the LDR preoperatively. The risks, benefits, complications, treatment options, and expected outcomes were discussed with the patient.  The patient concurred with the proposed plan, giving informed consent.  The site of surgery is discussed. The patient was taken to Operating Room # 01, identified as Candace Mustafa and the procedure verified as  Delivery. A Time Out was held and the above information confirmed.    After induction of anesthesia, the patient was draped and prepped in the usual sterile manner. A Pfannenstiel incision was made and carried down through the subcutaneous tissue to the fascia. Fascial incision was made and extended transversely. The fascia was  from the underlying rectus tissue superiorly and inferiorly. The peritoneum was identified and entered. Peritoneal incision was extended longitudinally. The utero-vesical peritoneal reflection was incised transversely and the bladder flap was bluntly freed from the  lower uterine segment.     At this point a uterine window was evident with only a thin layer of peritoneum covering the amnion with visible amniotic fluid and particulate matter floating behind the window.  The bladder was dissected more inferiorly using sharp dissection.  The inferior margin of the lower uterine segment was markedly diminished and atrophic with very little substantial tissue for the closure.  With the bladder dissected inferiorly attempts were made to reapproximate the upper segment with the lower segment.      An imbrication layer was precluded by the scan amount of tissue inferiorly.  The patient will be counseled regarding consideration to delivery at 37 weeks with a subsequent pregnancy and the risk of uterine scar separation proceeding that    A low transverse uterine incision was made. Delivered from vertex presentation was a male  fetus 7 lb 12.7 oz (3.535 kg)  with Apgar scores of     at one minute and    at five minutes. After the umbilical cord was clamped and cut cord blood was obtained for evaluation. The placenta was removed intact and appeared normal. The uterine outline, tubes and ovaries appeared normal.  The uterine incision was closed with running locked sutures of 0 Vicryl. Hemostasis was observed. Lavage was carried out until clear.     Peritoneum was closed with 2-0 Vicryl in a running fashion incorporating the muscle in the closure    The fascia was then reapproximated with running sutures of 0 Vicryl. The deep subcutaneous layer was reapproximated with 3-0 Vicryl in a running fashion. The skin was reapproximated with 3-0 monocryl in a subcuticular fashion.     Instrument, sponge, and needle counts were correct prior the abdominal closure and at the conclusion of the case.     Findings:  Uterine window - see notes     Estimated Blood Loss:  600           Total IV Fluids: 47984 ml           Specimens:  Placenta            Complications:  None; patient tolerated the procedure  well.           Disposition: PACU - hemodynamically stable.           Condition: stable    Attending Attestation: I was present and scrubbed for the entire procedure.      [x]  Labs reviewed:   Blood type A pos, Rubella Imm, Varicella Imm, Tdap given       Stew Castellaons MD  6/12/2017 8:35 AM

## 2017-06-12 NOTE — ANESTHESIA PROCEDURE NOTES
Intrathecal Block    Patient location during procedure: OR  Indication:at surgeon's request  Performed By  Anesthesiologist: CLIF CR  Preanesthetic Checklist  Completed: patient identified, site marked, surgical consent, pre-op evaluation, timeout performed, IV checked, risks and benefits discussed and monitors and equipment checked  Intrathecal Block Prep:  Pt Position:sitting  Sterile Tech:sterile barrier, gloves, cap and mask  Prep:Betadine  Monitoring:blood pressure monitoring, continuous pulse oximetry and EKG  Intrathecal Block Procedure:  Approach:midline  Guidance:palpation technique  Location:L3-L4  Needle Type:Doug  Needle Gauge:25 G  Placement of Needle Event: cerebrospinal fluid  Fluid Appearance:clear  Post Assessment:  Patient Tolerance:patient tolerated the procedure well with no apparent complications

## 2017-06-12 NOTE — ANESTHESIA PREPROCEDURE EVALUATION
Anesthesia Evaluation     Patient summary reviewed and Nursing notes reviewed   no history of anesthetic complications:  NPO Solid Status: > 6 hours       Airway   Mallampati: II  TM distance: >3 FB  Neck ROM: full  no difficulty expected  Dental - normal exam     Pulmonary - negative pulmonary ROS and normal exam   Cardiovascular - negative cardio ROS and normal exam        Neuro/Psych  GI/Hepatic/Renal/Endo      Musculoskeletal     Abdominal    Substance History      OB/GYN    (+) Pregnant (previous ),         Other        (-) blood dyscrasia                                  Anesthesia Plan    ASA 2     spinal   (Discussed dura morph)  Anesthetic plan and risks discussed with patient.

## 2017-06-12 NOTE — ANESTHESIA POSTPROCEDURE EVALUATION
Patient: Candace Mustafa    Procedure Summary     Date Anesthesia Start Anesthesia Stop Room / Location    17 0728 0830  [facility]  /  [facility]       Procedure Diagnosis Surgeon Provider     SECTION REPEAT (N/A Abdomen) Previous  section  (Previous  section [Z98.891]) MD Rafael Miles MD          Anesthesia Type: spinal  Last vitals  /65 (17 0945)    Temp      Pulse 55 (1735)   Resp 17 (17 0945)    SpO2 97 % (17)      Post Anesthesia Care and Evaluation    Patient location during evaluation: PACU  Anesthetic complications: No anesthetic complications

## 2017-06-13 LAB
BASOPHILS # BLD AUTO: 0.02 10*3/MM3 (ref 0–0.2)
BASOPHILS NFR BLD AUTO: 0.2 % (ref 0–1.5)
DEPRECATED RDW RBC AUTO: 47 FL (ref 37–54)
EOSINOPHIL # BLD AUTO: 0.19 10*3/MM3 (ref 0–0.7)
EOSINOPHIL NFR BLD AUTO: 1.7 % (ref 0.3–6.2)
ERYTHROCYTE [DISTWIDTH] IN BLOOD BY AUTOMATED COUNT: 13.5 % (ref 11.7–13)
HCT VFR BLD AUTO: 32.6 % (ref 35.6–45.5)
HGB BLD-MCNC: 10.9 G/DL (ref 11.9–15.5)
IMM GRANULOCYTES # BLD: 0.1 10*3/MM3 (ref 0–0.03)
IMM GRANULOCYTES NFR BLD: 0.9 % (ref 0–0.5)
LYMPHOCYTES # BLD AUTO: 2.68 10*3/MM3 (ref 0.9–4.8)
LYMPHOCYTES NFR BLD AUTO: 23.6 % (ref 19.6–45.3)
MCH RBC QN AUTO: 32 PG (ref 26.9–32)
MCHC RBC AUTO-ENTMCNC: 33.4 G/DL (ref 32.4–36.3)
MCV RBC AUTO: 95.6 FL (ref 80.5–98.2)
MONOCYTES # BLD AUTO: 0.73 10*3/MM3 (ref 0.2–1.2)
MONOCYTES NFR BLD AUTO: 6.4 % (ref 5–12)
NEUTROPHILS # BLD AUTO: 7.63 10*3/MM3 (ref 1.9–8.1)
NEUTROPHILS NFR BLD AUTO: 67.2 % (ref 42.7–76)
NRBC BLD MANUAL-RTO: 0 /100 WBC (ref 0–0)
PLATELET # BLD AUTO: 144 10*3/MM3 (ref 140–500)
PMV BLD AUTO: 12 FL (ref 6–12)
RBC # BLD AUTO: 3.41 10*6/MM3 (ref 3.9–5.2)
WBC NRBC COR # BLD: 11.35 10*3/MM3 (ref 4.5–10.7)

## 2017-06-13 PROCEDURE — 85025 COMPLETE CBC W/AUTO DIFF WBC: CPT | Performed by: OBSTETRICS & GYNECOLOGY

## 2017-06-13 RX ADMIN — SIMETHICONE CHEW TAB 80 MG 80 MG: 80 TABLET ORAL at 20:13

## 2017-06-13 RX ADMIN — IBUPROFEN 800 MG: 800 TABLET ORAL at 07:54

## 2017-06-13 RX ADMIN — IBUPROFEN 800 MG: 800 TABLET ORAL at 16:10

## 2017-06-13 RX ADMIN — OXYCODONE HYDROCHLORIDE AND ACETAMINOPHEN 1 TABLET: 5; 325 TABLET ORAL at 03:35

## 2017-06-13 RX ADMIN — OXYCODONE HYDROCHLORIDE AND ACETAMINOPHEN 1 TABLET: 5; 325 TABLET ORAL at 14:41

## 2017-06-13 RX ADMIN — SIMETHICONE CHEW TAB 80 MG 80 MG: 80 TABLET ORAL at 07:54

## 2017-06-13 RX ADMIN — SIMETHICONE CHEW TAB 80 MG 80 MG: 80 TABLET ORAL at 14:41

## 2017-06-13 RX ADMIN — OXYCODONE HYDROCHLORIDE AND ACETAMINOPHEN 1 TABLET: 5; 325 TABLET ORAL at 20:13

## 2017-06-13 NOTE — PLAN OF CARE
Problem: Patient Care Overview (Adult)  Goal: Plan of Care Review  Outcome: Ongoing (interventions implemented as appropriate)    17 0113   Coping/Psychosocial Response Interventions   Plan Of Care Reviewed With patient   Patient Care Overview   Progress improving       Goal: Adult Individualization and Mutuality  Outcome: Ongoing (interventions implemented as appropriate)  Goal: Discharge Needs Assessment  Outcome: Ongoing (interventions implemented as appropriate)    Problem: Postpartum ( Delivery) (Adult)  Goal: Signs and Symptoms of Listed Potential Problems Will be Absent or Manageable (Postpartum)  Outcome: Ongoing (interventions implemented as appropriate)

## 2017-06-13 NOTE — PROGRESS NOTES
[facility]    2017    Patient: Candace Mustafa   MR#:[id number]        PROGRESS NOTE      HD#1  Post-Op Day 1 S/P    Delivered a male infant.    Subjective     Candace Mustafa is a 32 y.o. female  post operative from CS at 39w0d weeks  Patient reports:  Pain is well controlled. Voiding and ambulating without difficulty.  Tolerating po. Lochia normal.     The patient breastfeed    Patient Active Problem List   Diagnosis   • History of shoulder dystocia in prior pregnancy, currently pregnant in third trimester   • Previous  section   • Short interval between pregnancies affecting pregnancy in second trimester, antepartum   • Supervision of normal pregnancy   • Group B Streptococcus carrier, +RV culture, currently pregnant   • Pregnancy   • Uterine abnormality in pregnancy in third trimester        Objective      Vital Signs Range for the last 24 hours    Temperature: Temp:  [97.2 °F (36.2 °C)-98.4 °F (36.9 °C)] 98.1 °F (36.7 °C)  Temp Source: Temp src: Oral  BP:  BP: ()/(60-73) 108/73  Pulse:  Heart Rate:  [55-72] 72  Respirations: Resp:  [16-18] 18  Weight: 190 lb (86.2 kg)   BMI:  Body mass index is 30.21 kg/(m^2).      I/O last 3 completed shifts:  In: 3000 [I.V.:3000]  Out: 2375 [Urine:2375]  I/O this shift:  In: -   Out: 500 [Urine:500]     Physical Exam      General Alert and awake, in NAD    CV  Regular rate and rhythm    Lungs  clear to auscultation bilaterally    Abdomen Soft, non-distended, fundus firm,  below umbilicus, appropriately tender    Incision  Dressing clean, dry and intact.    Extremities  no edema, calves     LABS:   Lab Results   Component Value Date    WBC 11.35 (H) 2017    HGB 10.9 (L) 2017    HCT 32.6 (L) 2017    MCV 95.6 2017     2017         Assessment/Plan       1.  POD #1 S/P C/S:  Hemodynamically stable.  Doing well.   2. Male infant - desires circ - R/B/A discussed - desires to  proceed   3. Uterine window - discussed implications/risks for future deliveries - will discuss further with Dr. Castellanos      Plan:  Continue routine care.     Active Problems:    Pregnancy        Glendy Ritter MD  6/13/2017  1:03 PM

## 2017-06-14 VITALS
HEART RATE: 69 BPM | OXYGEN SATURATION: 100 % | SYSTOLIC BLOOD PRESSURE: 124 MMHG | TEMPERATURE: 98 F | BODY MASS INDEX: 29.82 KG/M2 | WEIGHT: 190 LBS | RESPIRATION RATE: 16 BRPM | HEIGHT: 67 IN | DIASTOLIC BLOOD PRESSURE: 82 MMHG

## 2017-06-14 PROCEDURE — 99024 POSTOP FOLLOW-UP VISIT: CPT | Performed by: OBSTETRICS & GYNECOLOGY

## 2017-06-14 RX ORDER — OXYCODONE HYDROCHLORIDE AND ACETAMINOPHEN 5; 325 MG/1; MG/1
1 TABLET ORAL EVERY 4 HOURS PRN
Qty: 30 TABLET | Refills: 0 | Status: SHIPPED | OUTPATIENT
Start: 2017-06-14 | End: 2017-06-20

## 2017-06-14 RX ORDER — IBUPROFEN 600 MG/1
600 TABLET ORAL EVERY 6 HOURS PRN
Qty: 30 TABLET | Refills: 1 | Status: SHIPPED | OUTPATIENT
Start: 2017-06-14 | End: 2017-06-20

## 2017-06-14 RX ADMIN — OXYCODONE HYDROCHLORIDE AND ACETAMINOPHEN 2 TABLET: 5; 325 TABLET ORAL at 14:00

## 2017-06-14 RX ADMIN — IBUPROFEN 800 MG: 800 TABLET ORAL at 08:12

## 2017-06-14 RX ADMIN — OXYCODONE HYDROCHLORIDE AND ACETAMINOPHEN 1 TABLET: 5; 325 TABLET ORAL at 01:14

## 2017-06-14 RX ADMIN — IBUPROFEN 800 MG: 800 TABLET ORAL at 01:14

## 2017-06-14 RX ADMIN — SIMETHICONE CHEW TAB 80 MG 80 MG: 80 TABLET ORAL at 08:12

## 2017-06-14 RX ADMIN — OXYCODONE HYDROCHLORIDE AND ACETAMINOPHEN 1 TABLET: 5; 325 TABLET ORAL at 08:15

## 2017-06-14 RX ADMIN — SIMETHICONE CHEW TAB 80 MG 80 MG: 80 TABLET ORAL at 14:01

## 2017-06-14 NOTE — DISCHARGE SUMMARY
Section PROGRESS NOTE /  Discharge Summary                                                     2017    Post-Op Day 2 S/P    Subjective     Patient reports:  Pain is well controlled with Motrin and Percocet.    She is  ambulating.   Voiding - without difficulty.  She is tolerating her diet,  flatus reported.  Vaginal bleeding is light.                Bottle Feeding: Breasts are beginning to engorge.    Objective            Vitals: Vital Signs Range for the last 24 hours  Temperature: Temp:  [98 °F (36.7 °C)-98.4 °F (36.9 °C)] 98 °F (36.7 °C)   Temp Source: Temp src: Oral   BP: BP: (109-126)/(68-82) 124/82   Pulse: Heart Rate:  [69-75] 69   Respirations: Resp:  [16-18] 16   SPO2:     O2 Amount (l/min):     O2 Devices     Weight:              Physical Exam  Gen'l Appearance   Awake, alert, not uncomfortable.   Lungs  CVA  Heart Clear to auscultation bilaterally    RR without murmur / gallop.   Abdomen Round, Soft, Normal tenderness, Bowel sounds present.   Incision  healing well, no drainage, no erythema, no swelling, well approximated   Extremities Calves not tender, 1+ edema.                                 Results from last 7 days  Lab Units 17  0537 17  0601   WBC 10*3/mm3 11.35* 9.64   HEMOGLOBIN g/dL 10.9* 12.2   HEMATOCRIT % 32.6* 35.9   PLATELETS 10*3/mm3 144 155     A Positive    HOSPITAL COURSE: Cnadace Mustfaa is a 32-year-old white female, G5, now , who is admitted for a scheduled repeat  section on 2017, at 38 weeks 6 days gestation.  She delivered a 7 pound 12.7 ounce male with Apgars of 8 and 9 without complication.  Estimated blood loss 600 cc's.  The patient's uterine incision did have a window covered only by peritoneum.  The inferior layer of this lower uterine segment presented very thin tissue for the uterine closure.  This precluded a second layer.  Dr. Castellanos talked to the patient about possibly delivering at 37 weeks with a subsequent  pregnancy because of the risk of uterine rupture.     Her postoperative course is been uncomplicated.  She is remained afebrile with stable vital signs.  She is tolerating regular diet, passing flatus starting today, voiding without difficulty and has good pain control with Motrin and Percocet.  It is her second postop day and she is very certain that she would like to go home today and supposed to tomorrow.  I cautioned her about having kids at home and how much more work it will be when she gets home but she really wants to go.      I think it is reasonable if she really wants to go.  She is discharged home on regular diet and limited activity to return to the office in 1 weeks and again in 6 weeks.  She will have Motrin and Percocet for pain.  Instructions have been given.    Assessment/Plan        Assessment:    Candace Mustafa is Day 2  postop repeat .                 1.  Uterine window at the time of surgery.              2.  Historically has only used natural family planning, recommended no intercourse until she returns to the office at her 6 week checkup.    Plan:  plan for discharge today.        Gui Travis MD  2017  1:45 PM

## 2017-06-14 NOTE — PLAN OF CARE
Problem: Patient Care Overview (Adult)  Goal: Plan of Care Review  Outcome: Ongoing (interventions implemented as appropriate)    17 0219   Coping/Psychosocial Response Interventions   Plan Of Care Reviewed With patient   Patient Care Overview   Progress improving   Outcome Evaluation   Outcome Summary/Follow up Plan VSS. Pain controlled with oral meds. Bottlefeeding.        Goal: Adult Individualization and Mutuality  Outcome: Ongoing (interventions implemented as appropriate)  Goal: Discharge Needs Assessment  Outcome: Ongoing (interventions implemented as appropriate)    Problem: Postpartum ( Delivery) (Adult)  Goal: Signs and Symptoms of Listed Potential Problems Will be Absent or Manageable (Postpartum)  Outcome: Ongoing (interventions implemented as appropriate)

## 2017-06-20 ENCOUNTER — POSTPARTUM VISIT (OUTPATIENT)
Dept: OBSTETRICS AND GYNECOLOGY | Facility: CLINIC | Age: 32
End: 2017-06-20

## 2017-06-20 VITALS
BODY MASS INDEX: 27.15 KG/M2 | WEIGHT: 173 LBS | SYSTOLIC BLOOD PRESSURE: 94 MMHG | DIASTOLIC BLOOD PRESSURE: 60 MMHG | HEIGHT: 67 IN

## 2017-06-20 DIAGNOSIS — Z13.9 SCREENING: ICD-10-CM

## 2017-06-20 DIAGNOSIS — Z09 POSTOP CHECK: Primary | ICD-10-CM

## 2017-06-20 LAB
BILIRUB BLD-MCNC: NEGATIVE MG/DL
CLARITY, POC: CLEAR
COLOR UR: YELLOW
GLUCOSE UR STRIP-MCNC: NEGATIVE MG/DL
KETONES UR QL: NEGATIVE
LEUKOCYTE EST, POC: ABNORMAL
NITRITE UR-MCNC: NEGATIVE MG/ML
PH UR: 7 [PH] (ref 5–8)
PROT UR STRIP-MCNC: NEGATIVE MG/DL
RBC # UR STRIP: ABNORMAL /UL
SP GR UR: 1.02 (ref 1–1.03)
UROBILINOGEN UR QL: NORMAL

## 2017-06-20 PROCEDURE — 0503F POSTPARTUM CARE VISIT: CPT | Performed by: OBSTETRICS & GYNECOLOGY

## 2017-06-20 PROCEDURE — 81002 URINALYSIS NONAUTO W/O SCOPE: CPT | Performed by: OBSTETRICS & GYNECOLOGY

## 2017-06-20 NOTE — PROGRESS NOTES
"[facility]  Postop Visit    2017    Patient: Candace Mustafa          MR#:[id number]    History of Present Illness    32 y.o. female  status post  section presents without complaints feeling well.       ________________________________________  Patient Active Problem List   Diagnosis   • History of shoulder dystocia in prior pregnancy, currently pregnant in third trimester   • Previous  section   • Short interval between pregnancies affecting pregnancy in second trimester, antepartum   • Supervision of normal pregnancy   • Group B Streptococcus carrier, +RV culture, currently pregnant   • Pregnancy   • Uterine abnormality in pregnancy in third trimester       Past Medical History:   Diagnosis Date   • Anemia        Past Surgical History:   Procedure Laterality Date   •  SECTION Bilateral 2016    Procedure:  SECTION PRIMARY;  Surgeon: Ann Mitchell MD;  Location: [facility] LABOR DELIVERY;  Service:    •  SECTION N/A 2017    Procedure:  SECTION REPEAT;  Surgeon: Stew Castellanos MD;  Location: [facility] LABOR DELIVERY;  Service:    • DILATATION AND CURETTAGE     • WISDOM TOOTH EXTRACTION         History   Smoking Status   • Never Smoker   Smokeless Tobacco   • Never Used       currently has no medications in their medication list.  ________________________________________  Review of Systems   Constitutional: Negative.    Respiratory: Negative.    Cardiovascular: Negative.    Gastrointestinal: Negative.    Genitourinary: Negative.    Psychiatric/Behavioral: Negative.           Objective       BP 94/60  Ht 66.5\" (168.9 cm)  Wt 173 lb (78.5 kg)  LMP 2016  Breastfeeding? No  BMI 27.5 kg/m2   BP Readings from Last 3 Encounters:   17 94/60   17 124/82   17 110/60      Wt Readings from Last 3 Encounters:   17 173 lb (78.5 kg)   17 190 lb (86.2 kg)   17 189 lb (85.7 kg)      BMI: Estimated " "body mass index is 27.5 kg/(m^2) as calculated from the following:    Height as of this encounter: 66.5\" (168.9 cm).    Weight as of this encounter: 173 lb (78.5 kg).    EXAM     General:     Patient appears well in NAD  Abdomen: Soft, NT, +BS, no acute findings  Pelvic:    Incision: Dry, clean, intact healing without signs of infection  Ext:  No cyanosis, edema 1-2    Assessment:    1.  Normal postop check  2.  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  I discussed with the patient that to my best ability are repaired the defect but the amount of tissue in the area was limited.  The patient's at risk of the event repeating itself.  We discussed that uterine scar dehiscence with the onset of early labor can be catastrophic.  Consideration to future pregnancies should be carefully given.  If pregnancy occurs we discussed possible delivery at 37 or 38 weeks      Plan:       Stew Castellanos MD  2017 10:48 AM    "

## 2017-11-02 ENCOUNTER — OFFICE VISIT (OUTPATIENT)
Dept: OBSTETRICS AND GYNECOLOGY | Facility: CLINIC | Age: 32
End: 2017-11-02

## 2017-11-02 VITALS
BODY MASS INDEX: 25.27 KG/M2 | SYSTOLIC BLOOD PRESSURE: 102 MMHG | DIASTOLIC BLOOD PRESSURE: 64 MMHG | HEIGHT: 67 IN | WEIGHT: 161 LBS

## 2017-11-02 DIAGNOSIS — Z01.419 WELL WOMAN EXAM WITH ROUTINE GYNECOLOGICAL EXAM: Primary | ICD-10-CM

## 2017-11-02 PROCEDURE — 99395 PREV VISIT EST AGE 18-39: CPT | Performed by: NURSE PRACTITIONER

## 2017-11-02 NOTE — PROGRESS NOTES
[facility]  Routine Annual Visit    2017    Patient: Candace Mustafa          MR#:[id number]      History of Present Illness    32 y.o. female  who presents for annual exam. Last here in  for postpartum exam. Pap negative 2015. She is using condoms for contraception although she and  are considering tubal ligation as Candace was found to have a uterine window after last  and she has been made aware of the risks associated with this and future pregnancies. She does not want hormonal contraception. Her mother was diagnosed with stage 1 breast cancer following a routine mammogram at age 54 4 months ago. There is no other family hx per Candace. She is unsure if BRCA testing has been done yet but plans to inquire about this at her mom's next appointment. She will keep us updated. We discussed early screening with mammograms. Candace has no complaints today.    Patient's last menstrual period was 10/08/2017.  Obstetric History:  OB History      Para Term  AB Living    5 4 4 0 1 4    SAB TAB Ectopic Multiple Live Births    1 0 0 0 4        Obstetric Comments    16 ultrasound 9 weeks 2 days, ruiz, viable intrauterine pregnancy, estimated date of confinement 17 by scan, 17 by LMP. JKB  17 ultrasound 17 weeks 4 days, normal, complete anatomic survey, abdominal circumference 77th percentil e.JKB  5.11.17 - 34-3 weeks - EFW 68%, AC 87% -JHF          Menstrual History:     Patient's last menstrual period was 10/08/2017.       Sexual History:       ________________________________________  Patient Active Problem List   Diagnosis   • Supervision of normal pregnancy   • Group B Streptococcus carrier, +RV culture, currently pregnant   • Pregnancy   • Uterine abnormality in pregnancy in third trimester   • ERRONEOUS ENCOUNTER--DISREGARD       Past Medical History:   Diagnosis Date   • Anemia        Past Surgical History:   Procedure Laterality Date   •   SECTION Bilateral 2016    Procedure:  SECTION PRIMARY;  Surgeon: Ann Mitchell MD;  Location: [facility] LABOR DELIVERY;  Service:    •  SECTION N/A 2017    Procedure:  SECTION REPEAT;  Surgeon: Stew Castellanos MD;  Location: [facility] LABOR DELIVERY;  Service:    • DILATATION AND CURETTAGE     • WISDOM TOOTH EXTRACTION         History   Smoking Status   • Never Smoker   Smokeless Tobacco   • Never Used       Family History   Problem Relation Age of Onset   • Breast cancer Mother 54   • No Known Problems Father    • No Known Problems Son    • No Known Problems Daughter    • Dementia Maternal Grandmother    • No Known Problems Daughter        Prior to Admission medications    Not on File     ________________________________________    Current contraception: condoms  History of abnormal Pap smear: no  Family history of uterine or ovarian cancer: no  Family History of colon cancer/colon polyps: no  History of abnormal mammogram: no  History of abnormal lipids: no    The following portions of the patient's history were reviewed and updated as appropriate: allergies, current medications, past family history, past medical history, past social history, past surgical history and problem list.    Review of Systems   Constitutional: Negative.    HENT: Negative.    Eyes: Negative for visual disturbance.   Respiratory: Negative for cough, shortness of breath and wheezing.    Cardiovascular: Negative for chest pain, palpitations and leg swelling.   Gastrointestinal: Negative for abdominal distention, abdominal pain, blood in stool, constipation, diarrhea, nausea and vomiting.   Endocrine: Negative for cold intolerance and heat intolerance.   Genitourinary: Negative for difficulty urinating, dyspareunia, dysuria, frequency, genital sores, hematuria, menstrual problem, pelvic pain, urgency, vaginal bleeding, vaginal discharge and vaginal pain.   Musculoskeletal: Negative.    Skin:  "Negative.    Neurological: Negative for dizziness, weakness, light-headedness, numbness and headaches.   Hematological: Negative.    Psychiatric/Behavioral: Negative.    Breasts: negative for lumps skin changes, dimpling, swelling, nipple changes/discharge bilaterally       Objective   Physical Exam    /64  Ht 66.5\" (168.9 cm)  Wt 161 lb (73 kg)  LMP 10/08/2017  Breastfeeding? No  BMI 25.6 kg/m2   BP Readings from Last 3 Encounters:   11/02/17 102/64   06/20/17 94/60   06/14/17 124/82      Wt Readings from Last 3 Encounters:   11/02/17 161 lb (73 kg)   06/20/17 173 lb (78.5 kg)   06/12/17 190 lb (86.2 kg)        BMI: Estimated body mass index is 25.6 kg/(m^2) as calculated from the following:    Height as of this encounter: 66.5\" (168.9 cm).    Weight as of this encounter: 161 lb (73 kg).      General:   alert, appears stated age and cooperative   Heart: regular rate and rhythm, S1, S2 normal, no murmur, click, rub or gallop   Lungs: clear to auscultation bilaterally   Abdomen: soft, non-tender, without masses or organomegaly   Breast: inspection negative, no nipple discharge or bleeding, no masses or nodularity palpable   Vulva: normal   Vagina: normal mucosa, normal discharge   Cervix: no cervical motion tenderness, no lesions   Uterus: normal size, mobile, non-tender or normal shape and consistency   Adnexa: no mass, fullness, tenderness     Assessment:    1. Normal annual exam   2. Pt plans to continue strict condom use. She knows she can make a consult with Dr. Castellanos at any time should she desire to proceed with tubal ligation.  3. Family hx breast cancer- surveillance discussed in detail; self breast exams encouraged    Plan:    []  Rx:   []  Mammogram request made  [x]  PAP done  []  Occult fecal blood test (Insure)  []  Labs:   []  GC/Chl/TV  []  DEXA scan   []  Referral for colonoscopy:           Araceli Burgos, HANG  11/2/2017 1:23 PM  "

## 2017-11-06 LAB
CYTOLOGIST CVX/VAG CYTO: NORMAL
CYTOLOGY CVX/VAG DOC THIN PREP: NORMAL
DX ICD CODE: NORMAL
HIV 1 & 2 AB SER-IMP: NORMAL
HPV I/H RISK 4 DNA CVX QL PROBE+SIG AMP: NEGATIVE
Lab: NORMAL
OTHER STN SPEC: NORMAL
PATH REPORT.FINAL DX SPEC: NORMAL
STAT OF ADQ CVX/VAG CYTO-IMP: NORMAL

## 2017-11-07 ENCOUNTER — TELEPHONE (OUTPATIENT)
Dept: OBSTETRICS AND GYNECOLOGY | Facility: CLINIC | Age: 32
End: 2017-11-07

## 2017-11-07 NOTE — TELEPHONE ENCOUNTER
----- Message from HANG Li sent at 11/7/2017  9:09 AM EST -----  Please inform patient her pap smear returned negative (normal). Thank you.

## 2018-03-29 ENCOUNTER — TELEPHONE (OUTPATIENT)
Dept: OBSTETRICS AND GYNECOLOGY | Age: 33
End: 2018-03-29

## 2018-03-29 PROBLEM — Z34.90 PREGNANCY: Status: RESOLVED | Noted: 2017-06-12 | Resolved: 2018-03-29

## 2018-03-29 PROBLEM — Z34.90 SUPERVISION OF NORMAL PREGNANCY: Status: RESOLVED | Noted: 2017-02-09 | Resolved: 2018-03-29

## 2018-03-29 PROBLEM — O99.820 GROUP B STREPTOCOCCUS CARRIER, +RV CULTURE, CURRENTLY PREGNANT: Status: RESOLVED | Noted: 2017-06-03 | Resolved: 2018-03-29

## 2018-03-29 NOTE — TELEPHONE ENCOUNTER
Spoke with patient and explained Dr. Castellanos would like to discuss with her in office, patient is scheduled to see Dr. Castellanos 5/7/18. Patient verbalized understanding.

## 2018-03-29 NOTE — TELEPHONE ENCOUNTER
Patient called stating Dr. Castellanos told her if she were to get pregnant again, she would be high risk due to the complications during her last  9 months ago; before she does something permanent she wants to go to [facility], however they told her she has to be referred by Dr. Castellanos for a second opinion. Please advise.

## 2018-03-29 NOTE — TELEPHONE ENCOUNTER
Call pt:  Not sure M[facility] has that much to offer.    Suggest consult in the office to discuss prior events and concerns with future pregnancy

## 2019-02-21 ENCOUNTER — OFFICE VISIT (OUTPATIENT)
Dept: OBSTETRICS AND GYNECOLOGY | Age: 34
End: 2019-02-21

## 2019-02-21 VITALS
BODY MASS INDEX: 21.5 KG/M2 | SYSTOLIC BLOOD PRESSURE: 102 MMHG | DIASTOLIC BLOOD PRESSURE: 60 MMHG | WEIGHT: 133.8 LBS | HEIGHT: 66 IN

## 2019-02-21 DIAGNOSIS — Z01.419 WELL WOMAN EXAM WITH ROUTINE GYNECOLOGICAL EXAM: Primary | ICD-10-CM

## 2019-02-21 PROCEDURE — 99395 PREV VISIT EST AGE 18-39: CPT | Performed by: NURSE PRACTITIONER

## 2019-02-21 NOTE — PROGRESS NOTES
[facility]  Routine Annual Visit    2019    Patient: Candace Mustafa          MR#:[id number]      History of Present Illness    33 y.o. female  who presents for annual exam. Last pap negative, HPV negative 2017. Candace and her  continue to use condoms and natural family planning for contraception. Declines hormonal contraceptives due to Bahai reasons. Candace reports her mother is doing well following her breast cancer diagnosis last year. We again discussed surveillance for Candace. Offered and encouraged early baseline screening mammogram but Candace declines- would rather wait closer to age 36. She also declines BRCA testing at this time. Candace has no complaints today. Sees Dr. Kehrer for primary care. 4 kids doing well - building a house.      Patient's last menstrual period was 2019 (exact date).  Obstetric History:  OB History      Para Term  AB Living    5 4 4 0 1 4    SAB TAB Ectopic Molar Multiple Live Births    1 0 0   0 4        Obstetric Comments    16 ultrasound 9 weeks 2 days, ruiz, viable intrauterine pregnancy, estimated date of confinement 17 by scan, 17 by LMP. JKB  17 ultrasound 17 weeks 4 days, normal, complete anatomic survey, abdominal circumference 77th percentil e.JKB  5.11.17 - 34-3 weeks - EFW 68%, AC 87% -JHF          Menstrual History:     Patient's last menstrual period was 2019 (exact date).       Sexual History:       ________________________________________  Patient Active Problem List   Diagnosis   • Uterine abnormality in pregnancy in third trimester       Past Medical History:   Diagnosis Date   • Anemia        Past Surgical History:   Procedure Laterality Date   •  SECTION Bilateral 2016    Procedure:  SECTION PRIMARY;  Surgeon: Ann Mitchell MD;  Location: [facility] LABOR DELIVERY;  Service:    •  SECTION N/A 2017    Procedure:  SECTION REPEAT;   "Surgeon: Stew Castellanos MD;  Location: [facility] LABOR DELIVERY;  Service:    • DILATATION AND CURETTAGE     • WISDOM TOOTH EXTRACTION         Social History     Tobacco Use   Smoking Status Never Smoker   Smokeless Tobacco Never Used       Family History   Problem Relation Age of Onset   • Breast cancer Mother 54   • No Known Problems Father    • No Known Problems Son    • No Known Problems Daughter    • Dementia Maternal Grandmother    • No Known Problems Daughter        Prior to Admission medications    Not on File     ________________________________________  The following portions of the patient's history were reviewed and updated as appropriate: allergies, current medications, past family history, past medical history, past social history, past surgical history and problem list.    Review of Systems   Constitutional: Negative.    HENT: Negative.    Eyes: Negative for visual disturbance.   Respiratory: Negative for cough, shortness of breath and wheezing.    Cardiovascular: Negative for chest pain, palpitations and leg swelling.   Gastrointestinal: Negative for abdominal distention, abdominal pain, blood in stool, constipation, diarrhea, nausea and vomiting.   Endocrine: Negative for cold intolerance and heat intolerance.   Genitourinary: Negative for difficulty urinating, dyspareunia, dysuria, frequency, genital sores, hematuria, menstrual problem, pelvic pain, urgency, vaginal bleeding, vaginal discharge and vaginal pain.   Musculoskeletal: Negative.    Skin: Negative.    Neurological: Negative for dizziness, weakness, light-headedness, numbness and headaches.   Hematological: Negative.    Psychiatric/Behavioral: Negative.    Breasts: negative for lumps skin changes, dimpling, swelling, nipple changes/discharge bilaterally         Objective   Physical Exam    /60   Ht 167.6 cm (66\")   Wt 60.7 kg (133 lb 12.8 oz)   LMP 02/16/2019 (Exact Date)   Breastfeeding? No   BMI 21.60 kg/m²    BP Readings from " "Last 3 Encounters:   02/21/19 102/60   11/02/17 102/64   06/20/17 94/60      Wt Readings from Last 3 Encounters:   02/21/19 60.7 kg (133 lb 12.8 oz)   11/02/17 73 kg (161 lb)   06/20/17 78.5 kg (173 lb)        BMI: Estimated body mass index is 21.6 kg/m² as calculated from the following:    Height as of this encounter: 167.6 cm (66\").    Weight as of this encounter: 60.7 kg (133 lb 12.8 oz).        General:   alert, appears stated age and cooperative   Heart: regular rate and rhythm, S1, S2 normal, no murmur, click, rub or gallop   Lungs: clear to auscultation bilaterally   Abdomen: soft, non-tender, without masses or organomegaly   Breast: inspection negative, no nipple discharge or bleeding, no masses or nodularity palpable   Vulva: normal   Vagina: normal mucosa, normal discharge   Cervix: no cervical motion tenderness and no lesions   Uterus: normal size, mobile, non-tender or normal shape and consistency   Adnexa: no mass, fullness, tenderness     Assessment:    1. Normal annual exam   2. Healthy lifestyle modifications discussed, counseled on self breast exams and bone health      Plan:    []  Rx:   []  Mammogram request made  [x]  PAP done  []  Occult fecal blood test (Insure)  []  Labs:   []  GC/Chl/TV  []  DEXA scan   []  Referral for colonoscopy:           Araceli Burgos, HANG  2/21/2019 8:50 AM  "

## 2019-02-25 LAB
CYTOLOGIST CVX/VAG CYTO: NORMAL
CYTOLOGY CVX/VAG DOC THIN PREP: NORMAL
DX ICD CODE: NORMAL
HIV 1 & 2 AB SER-IMP: NORMAL
HPV I/H RISK 4 DNA CVX QL PROBE+SIG AMP: NEGATIVE
OTHER STN SPEC: NORMAL
PATH REPORT.FINAL DX SPEC: NORMAL
STAT OF ADQ CVX/VAG CYTO-IMP: NORMAL

## 2019-02-26 ENCOUNTER — TELEPHONE (OUTPATIENT)
Dept: OBSTETRICS AND GYNECOLOGY | Age: 34
End: 2019-02-26

## 2019-02-26 NOTE — TELEPHONE ENCOUNTER
----- Message from HANG Li sent at 2/25/2019  1:05 PM EST -----  Please inform patient her pap smear returned negative (normal). Thank you.

## 2020-03-11 ENCOUNTER — OFFICE VISIT (OUTPATIENT)
Dept: OBSTETRICS AND GYNECOLOGY | Age: 35
End: 2020-03-11

## 2020-03-11 VITALS
HEIGHT: 66 IN | WEIGHT: 143 LBS | SYSTOLIC BLOOD PRESSURE: 102 MMHG | DIASTOLIC BLOOD PRESSURE: 60 MMHG | BODY MASS INDEX: 22.98 KG/M2

## 2020-03-11 DIAGNOSIS — Z13.89 SCREENING FOR BLOOD OR PROTEIN IN URINE: ICD-10-CM

## 2020-03-11 DIAGNOSIS — Z01.419 WELL FEMALE EXAM WITH ROUTINE GYNECOLOGICAL EXAM: Primary | ICD-10-CM

## 2020-03-11 DIAGNOSIS — Z12.4 PAP SMEAR FOR CERVICAL CANCER SCREENING: ICD-10-CM

## 2020-03-11 LAB
BILIRUB BLD-MCNC: NEGATIVE MG/DL
CLARITY, POC: CLEAR
COLOR UR: YELLOW
GLUCOSE UR STRIP-MCNC: NEGATIVE MG/DL
KETONES UR QL: NEGATIVE
LEUKOCYTE EST, POC: NEGATIVE
NITRITE UR-MCNC: NEGATIVE MG/ML
PH UR: 8.5 [PH] (ref 5–8)
PROT UR STRIP-MCNC: NEGATIVE MG/DL
RBC # UR STRIP: ABNORMAL /UL
SP GR UR: 1.02 (ref 1–1.03)
UROBILINOGEN UR QL: NORMAL

## 2020-03-11 PROCEDURE — 99395 PREV VISIT EST AGE 18-39: CPT | Performed by: OBSTETRICS & GYNECOLOGY

## 2020-03-11 PROCEDURE — 81002 URINALYSIS NONAUTO W/O SCOPE: CPT | Performed by: OBSTETRICS & GYNECOLOGY

## 2020-03-11 NOTE — PROGRESS NOTES
Routine Annual Visit    3/11/2020    Patient: Candace Mustafa          MR#:[id number]      History of Present Illness    Chief Complaint   Patient presents with   • Gynecologic Exam     Annual.  Last pap 19, negative.        34 y.o. female  who presents for annual exam.    Presents for routine annual exam feeling well without complaints.  The patient had a prior  section with findings of a uterine window at the time of .  We had a discussion about future pregnancies and the risk of scar separation        Patient's last menstrual period was 2020 (exact date).  Obstetric History:  OB History        5    Para   4    Term   4       0    AB   1    Living   4       SAB   1    TAB   0    Ectopic   0    Molar        Multiple   0    Live Births   4               Menstrual History:     Patient's last menstrual period was 2020 (exact date).       Sexual History:       ________________________________________  Patient Active Problem List   Diagnosis   • Uterine abnormality in pregnancy in third trimester   • Well female exam with routine gynecological exam       Past Medical History:   Diagnosis Date   • Anemia        Past Surgical History:   Procedure Laterality Date   •  SECTION Bilateral 2016    Procedure:  SECTION PRIMARY;  Surgeon: Ann Mitchell MD;  Location: [facility] LABOR DELIVERY;  Service:    •  SECTION N/A 2017    Procedure:  SECTION REPEAT;  Surgeon: Stew Castellanos MD;  Location: [facility] LABOR DELIVERY;  Service:    • DILATATION AND CURETTAGE     • WISDOM TOOTH EXTRACTION         Social History     Tobacco Use   Smoking Status Never Smoker   Smokeless Tobacco Never Used       Family History   Problem Relation Age of Onset   • Breast cancer Mother 54   • No Known Problems Father    • No Known Problems Son    • No Known Problems Daughter    • Dementia Maternal Grandmother    • No Known Problems Daughter   "      Prior to Admission medications    Medication Sig Start Date End Date Taking? Authorizing Provider   Multiple Vitamin (MULTI-VITAMIN DAILY PO) Take  by mouth Daily.   Yes Provider, Jelani, MD     ________________________________________    Current contraception: rhythm method  History of abnormal Pap smear: no  Family history of uterine or ovarian cancer: no  Family History of colon cancer/colon polyps: no  History of abnormal mammogram: no  History of abnormal lipids: no    The following portions of the patient's history were reviewed and updated as appropriate: allergies, current medications, past family history, past medical history, past social history, past surgical history and problem list.    Review of Systems    Pertinent items are noted in HPI.     Objective   Physical Exam    /60   Ht 167.6 cm (66\")   Wt 64.9 kg (143 lb)   LMP 03/04/2020 (Exact Date)   Breastfeeding No   BMI 23.08 kg/m²    BP Readings from Last 3 Encounters:   03/11/20 102/60   02/21/19 102/60   11/02/17 102/64      Wt Readings from Last 3 Encounters:   03/11/20 64.9 kg (143 lb)   02/21/19 60.7 kg (133 lb 12.8 oz)   11/02/17 73 kg (161 lb)        BMI: Estimated body mass index is 23.08 kg/m² as calculated from the following:    Height as of this encounter: 167.6 cm (66\").    Weight as of this encounter: 64.9 kg (143 lb).       General: alert, appears stated age and cooperative   Heart: regular rate and rhythm, S1, S2 normal, no murmur, click, rub or gallop   Lungs: clear to auscultation bilaterally   Abdomen: soft, non-tender, without masses, no organomegaly   Breast: inspection negative, no nipple discharge or bleeding, no masses or nodularity palpable   Vulva: normal and bartholin's, Urethra, Kieler's normal   Vagina: normal mucosa, normal discharge   Cervix: no lesions   Uterus: normal size   Adnexa: normal adnexa     As part of wellness and prevention, the following topics were discussed with the patient:     []  " Nutrition  []  Physical activity/regular exercise   []  Healthy weight  []  Injury prevention  []  Smoking cessation  []  Substance misuse/abuse  []  Sexual behavior  []  STD prevention  []  Contaception  []  Dental health  []  Mental health  []  Immunization  [x]  Encouraged SBE        Assessment:    Candace was seen today for gynecologic exam.    Diagnoses and all orders for this visit:    Well female exam with routine gynecological exam  -     POC Urinalysis Dipstick  -     IgP, Aptima HPV    Pap smear for cervical cancer screening  -     IgP, Aptima HPV    Screening for blood or protein in urine  -     POC Urinalysis Dipstick          Plan:  Return in about 1 year (around 3/11/2021) for Annual GYN exam.      Stew Castellanos MD  3/11/2020 12:15

## 2020-03-13 LAB
CYTOLOGIST CVX/VAG CYTO: NORMAL
CYTOLOGY CVX/VAG DOC CYTO: NORMAL
CYTOLOGY CVX/VAG DOC THIN PREP: NORMAL
DX ICD CODE: NORMAL
HIV 1 & 2 AB SER-IMP: NORMAL
HPV I/H RISK 4 DNA CVX QL PROBE+SIG AMP: NEGATIVE
OTHER STN SPEC: NORMAL
STAT OF ADQ CVX/VAG CYTO-IMP: NORMAL

## 2020-03-16 ENCOUNTER — TELEPHONE (OUTPATIENT)
Dept: OBSTETRICS AND GYNECOLOGY | Age: 35
End: 2020-03-16

## 2020-03-16 NOTE — TELEPHONE ENCOUNTER
----- Message from Stew Castellanos MD sent at 3/14/2020 10:10 AM EDT -----  Call pt:  PAP is negative.

## 2020-09-15 ENCOUNTER — OFFICE VISIT (OUTPATIENT)
Dept: FAMILY MEDICINE CLINIC | Facility: CLINIC | Age: 35
End: 2020-09-15

## 2020-09-15 VITALS
SYSTOLIC BLOOD PRESSURE: 122 MMHG | BODY MASS INDEX: 24.11 KG/M2 | OXYGEN SATURATION: 97 % | DIASTOLIC BLOOD PRESSURE: 62 MMHG | TEMPERATURE: 96.9 F | WEIGHT: 150 LBS | HEIGHT: 66 IN | HEART RATE: 83 BPM

## 2020-09-15 DIAGNOSIS — Z00.00 ROUTINE HEALTH MAINTENANCE: Primary | ICD-10-CM

## 2020-09-15 DIAGNOSIS — Z32.00 POSSIBLE PREGNANCY: ICD-10-CM

## 2020-09-15 LAB
B-HCG UR QL: NEGATIVE
INTERNAL NEGATIVE CONTROL: NORMAL
INTERNAL POSITIVE CONTROL: NORMAL
Lab: NORMAL

## 2020-09-15 PROCEDURE — 81025 URINE PREGNANCY TEST: CPT | Performed by: FAMILY MEDICINE

## 2020-09-15 PROCEDURE — 99385 PREV VISIT NEW AGE 18-39: CPT | Performed by: FAMILY MEDICINE

## 2020-09-15 RX ORDER — PRENATAL VIT/IRON FUM/FOLIC AC 27MG-0.8MG
1 TABLET ORAL DAILY
COMMUNITY

## 2020-09-15 NOTE — PROGRESS NOTES
Subjective   Candace Mustafa is a 35 y.o. female who presents for annual female wellness exam.  Chief Complaint   Patient presents with   • Annual Exam     positive at home pregnancy test 2 days ago   LMP 2020  Has had some breast tenderness and is bloated.  This is her first time in my new office.  Patient is concerned that her pregnancy will be high risk this time because at her repeat  3 years ago after her skin incision the OB noted that her uterine anterior wall was very thin near her scar.  She is taking her prenatal vitamins.  She has no other complaints today.    Menstrual History: regular  Pregnancy History:   Sexual History: with spouse   Contraception: none  Hormone Replacement Therapy: na  Diet: standard  Exercise: some  Do you feel safe? yes  Have you ever been abused? no    Mammogram: na  Pap Smear: up to date  Bone Density: na  Colon Cancer Screening: na    Immunization History   Administered Date(s) Administered   • Tdap 2016, 2017       The following portions of the patient's history were reviewed and updated as appropriate: allergies, current medications, past family history, past medical history, past social history, past surgical history and problem list.    Past Medical History:   Diagnosis Date   • Anemia        Past Surgical History:   Procedure Laterality Date   •  SECTION Bilateral 2016    Procedure:  SECTION PRIMARY;  Surgeon: Ann Mitchell MD;  Location: [facility] LABOR DELIVERY;  Service:    •  SECTION N/A 2017    Procedure:  SECTION REPEAT;  Surgeon: Stew Castellanos MD;  Location: [facility] LABOR DELIVERY;  Service:    • DILATATION AND CURETTAGE     • WISDOM TOOTH EXTRACTION         Family History   Problem Relation Age of Onset   • Breast cancer Mother 54   • No Known Problems Father    • No Known Problems Son    • No Known Problems Daughter    • Dementia Maternal Grandmother    • No Known Problems Daughter   "      Social History     Socioeconomic History   • Marital status:      Spouse name: Kodi   • Number of children: 4   • Years of education: 16   • Highest education level: Not on file   Occupational History   • Occupation: RN     Employer: [employer]   Tobacco Use   • Smoking status: Never Smoker   • Smokeless tobacco: Never Used   Substance and Sexual Activity   • Alcohol use: Yes     Comment: occasional   • Drug use: No   • Sexual activity: Yes     Partners: Male     Birth control/protection: None     Comment: [employer]   Social History Narrative    Patient has been seen in the office since 2005.         Current Outpatient Medications:   •  Prenatal Vit-Fe Fumarate-FA (prenatal vitamin 27-0.8) 27-0.8 MG tablet tablet, Take 1 tablet by mouth Daily., Disp: , Rfl:     Review of Systems   Constitutional: Negative for chills, fatigue and fever.   HENT: Negative for congestion, ear pain, rhinorrhea and sore throat.    Eyes: Negative for pain and redness.   Respiratory: Negative for cough, chest tightness and wheezing.    Cardiovascular: Negative for chest pain and leg swelling.   Gastrointestinal: Negative for abdominal pain, constipation, diarrhea, nausea and vomiting.   Endocrine: Negative for polydipsia and polyphagia.   Genitourinary: Negative for dysuria and hematuria.   Musculoskeletal: Negative for arthralgias and myalgias.   Skin: Negative for color change and rash.   Allergic/Immunologic: Negative.    Neurological: Negative for dizziness, weakness, light-headedness and headaches.   Hematological: Negative.    Psychiatric/Behavioral: Negative for confusion, dysphoric mood and sleep disturbance.       Objective   Vitals:    09/15/20 0939   BP: 122/62   Pulse: 83   Temp: 96.9 °F (36.1 °C)   SpO2: 97%   Weight: 68 kg (150 lb)   Height: 167.6 cm (66\")     Body mass index is 24.21 kg/m².  Physical Exam  Constitutional:       Appearance: She is well-developed.   HENT:      " Head: Normocephalic and atraumatic.   Eyes:      Conjunctiva/sclera: Conjunctivae normal.      Pupils: Pupils are equal, round, and reactive to light.   Neck:      Musculoskeletal: Neck supple.      Thyroid: No thyromegaly.   Cardiovascular:      Rate and Rhythm: Normal rate and regular rhythm.      Heart sounds: No murmur.   Pulmonary:      Effort: Pulmonary effort is normal.      Breath sounds: Normal breath sounds. No wheezing.   Abdominal:      Palpations: Abdomen is soft.   Musculoskeletal: Normal range of motion.   Skin:     General: Skin is warm and dry.   Neurological:      Mental Status: She is alert and oriented to person, place, and time.      Cranial Nerves: No cranial nerve deficit.           Assessment/Plan   Candace was seen today for annual exam.    Diagnoses and all orders for this visit:    Routine health maintenance    Possible pregnancy  -     POC Pregnancy, Urine  -     HCG, B-subunit, Quantitative               Discussed the importance of maintaining a healthy weight and getting regular exercise.  Educated patient on the benefits of healthy diet.  Advise follow-up annually for wellness exams.    Patient Instructions   Follow up pending results of Quant.  Get flu vaccine at work.

## 2020-09-16 LAB — HCG INTACT+B SERPL-ACNC: 74.89 MIU/ML

## 2020-09-23 ENCOUNTER — TELEPHONE (OUTPATIENT)
Dept: OBSTETRICS AND GYNECOLOGY | Age: 35
End: 2020-09-23

## 2020-09-23 DIAGNOSIS — O20.0 THREATENED ABORTION: Primary | ICD-10-CM

## 2020-09-23 NOTE — TELEPHONE ENCOUNTER
Pt lmp 8/14/2020..small amt bright red bleeding today. No cramping.Pt concerned and wants to know if she can be seen or have a quant drawn?

## 2020-09-23 NOTE — TELEPHONE ENCOUNTER
I ordered a repeat HCG level that she can come in to get today.  We will have that level back tomorrow and will let her know.

## 2020-09-24 ENCOUNTER — TELEPHONE (OUTPATIENT)
Dept: OBSTETRICS AND GYNECOLOGY | Age: 35
End: 2020-09-24

## 2020-09-24 LAB — HCG INTACT+B SERPL-ACNC: 304 MIU/ML

## 2020-09-24 NOTE — TELEPHONE ENCOUNTER
Notify patient:  HCG is increase but not appropriately.    Discussed SAB warnings.    Request f/u Monday with HCG

## 2020-09-24 NOTE — TELEPHONE ENCOUNTER
Pt called yesterday with bright red bleeding after intercourse.LMP 8/14.Radha had a Quant ordered. Would you please review.Pt calling for results.dn

## 2020-09-25 PROBLEM — O02.81 INAPPROPRIATE CHANGE IN QUANTITATIVE HCG IN EARLY PREGNANCY: Status: ACTIVE | Noted: 2020-09-25

## 2020-09-25 NOTE — TELEPHONE ENCOUNTER
Results to pt. No provider or lab here except Tuesday and Wednesday next week. I can have her come either day for lab, does she need to be seen?

## 2020-09-28 LAB — HCG INTACT+B SERPL-ACNC: NORMAL MIU/ML

## 2020-09-29 ENCOUNTER — PROCEDURE VISIT (OUTPATIENT)
Dept: OBSTETRICS AND GYNECOLOGY | Age: 35
End: 2020-09-29

## 2020-09-29 ENCOUNTER — OFFICE VISIT (OUTPATIENT)
Dept: OBSTETRICS AND GYNECOLOGY | Age: 35
End: 2020-09-29

## 2020-09-29 VITALS
WEIGHT: 149 LBS | DIASTOLIC BLOOD PRESSURE: 78 MMHG | BODY MASS INDEX: 23.95 KG/M2 | HEIGHT: 66 IN | SYSTOLIC BLOOD PRESSURE: 122 MMHG

## 2020-09-29 DIAGNOSIS — O20.0 THREATENED ABORTION: ICD-10-CM

## 2020-09-29 DIAGNOSIS — O26.859 SPOTTING IN EARLY PREGNANCY: Primary | ICD-10-CM

## 2020-09-29 DIAGNOSIS — Z13.9 SPECIAL SCREENING: ICD-10-CM

## 2020-09-29 DIAGNOSIS — O02.81 INAPPROPRIATE CHANGE IN QUANTITATIVE HCG IN EARLY PREGNANCY: ICD-10-CM

## 2020-09-29 PROBLEM — O34.593 UTERINE ABNORMALITY IN PREGNANCY IN THIRD TRIMESTER: Status: RESOLVED | Noted: 2017-06-12 | Resolved: 2020-09-29

## 2020-09-29 PROBLEM — Z01.419 WELL FEMALE EXAM WITH ROUTINE GYNECOLOGICAL EXAM: Status: RESOLVED | Noted: 2020-03-11 | Resolved: 2020-09-29

## 2020-09-29 LAB
B-HCG UR QL: POSITIVE
INTERNAL NEGATIVE CONTROL: NEGATIVE
INTERNAL POSITIVE CONTROL: POSITIVE
Lab: ABNORMAL

## 2020-09-29 PROCEDURE — 76817 TRANSVAGINAL US OBSTETRIC: CPT | Performed by: OBSTETRICS & GYNECOLOGY

## 2020-09-29 PROCEDURE — 81025 URINE PREGNANCY TEST: CPT | Performed by: OBSTETRICS & GYNECOLOGY

## 2020-09-29 PROCEDURE — 99214 OFFICE O/P EST MOD 30 MIN: CPT | Performed by: OBSTETRICS & GYNECOLOGY

## 2020-09-29 NOTE — PROGRESS NOTES
"Subjective   Candace Mustafa is a 35 y.o. female presents for f/u on spotting in early pregnancy - pt of dr freitas , tiny spotting today reports more pink and brown discharge when wiping, no spotting yesterday.  Last Monday after intercourse patient reports some heavier spotting , denies any cramping today. Quants as follows:  9/15 - 75  9/23 - 304   - 1465     LMP - 20 -witth TONNY 6-4 weeks today by LMP      Blood type - A positive      TVUS today reveals very small hypoechoic sac noted in endometrium -   Will repeat quant tomorrrow   History of Present Illness    The following portions of the patient's history were reviewed and updated as appropriate: allergies, current medications, past family history, past medical history, past social history, past surgical history and problem list.    Review of Systems   Constitutional: Negative for chills, fatigue and fever.   Gastrointestinal: Negative for abdominal distention and abdominal pain.   Genitourinary: Positive for vaginal discharge. Negative for dyspareunia, dysuria, menstrual problem, pelvic pain, vaginal bleeding and vaginal pain.        + vag spotting    All other systems reviewed and are negative.  /78   Ht 167.6 cm (66\")   Wt 67.6 kg (149 lb)   LMP 2020 (Exact Date)   Breastfeeding No   BMI 24.05 kg/m²       Objective   Physical Exam  Vitals signs and nursing note reviewed.   Constitutional:       Appearance: Normal appearance. She is normal weight.   Pulmonary:      Effort: Pulmonary effort is normal.   Skin:     General: Skin is warm and dry.   Neurological:      Mental Status: She is alert and oriented to person, place, and time.   Psychiatric:         Mood and Affect: Mood normal.         Behavior: Behavior normal.         Assessment/Plan   Candace was seen today for gynecologic exam.    Diagnoses and all orders for this visit:    Spotting in early pregnancy    Threatened   -     HCG, B-subunit, Quantitative ([facility] Only); " Future    Special screening  -     POC Pregnancy, Urine    Inappropriate change in quantitative hCG in early pregnancy       Counseling was given to patient for the following topics: diagnostic results, instructions for management, risk factor reductions, impressions and importance of treatment compliance . Total time of the encounter was 25 minutes and 20 minutes was spend counseling.    Return in about 8 days (around 10/7/2020), or TVUS and f/u with Dr. Castellanos.

## 2020-09-30 ENCOUNTER — RESULTS ENCOUNTER (OUTPATIENT)
Dept: OBSTETRICS AND GYNECOLOGY | Age: 35
End: 2020-09-30

## 2020-09-30 ENCOUNTER — LAB (OUTPATIENT)
Dept: OBSTETRICS AND GYNECOLOGY | Age: 35
End: 2020-09-30

## 2020-09-30 DIAGNOSIS — O20.0 THREATENED ABORTION: ICD-10-CM

## 2020-09-30 DIAGNOSIS — Z13.9 SPECIAL SCREENING: Primary | ICD-10-CM

## 2020-09-30 LAB — HCG INTACT+B SERPL-ACNC: NORMAL MIU/ML

## 2020-10-01 NOTE — PROGRESS NOTES
Please call patient and notify that hcg is rising appropriately and to follow up with Dr. Castellanos next week.

## 2020-10-07 ENCOUNTER — PROCEDURE VISIT (OUTPATIENT)
Dept: OBSTETRICS AND GYNECOLOGY | Age: 35
End: 2020-10-07

## 2020-10-07 ENCOUNTER — OFFICE VISIT (OUTPATIENT)
Dept: OBSTETRICS AND GYNECOLOGY | Age: 35
End: 2020-10-07

## 2020-10-07 VITALS
WEIGHT: 149 LBS | DIASTOLIC BLOOD PRESSURE: 78 MMHG | BODY MASS INDEX: 23.95 KG/M2 | HEIGHT: 66 IN | SYSTOLIC BLOOD PRESSURE: 118 MMHG

## 2020-10-07 DIAGNOSIS — O03.9 COMPLETE ABORTION: Primary | ICD-10-CM

## 2020-10-07 DIAGNOSIS — O02.81 INAPPROPRIATE CHANGE IN QUANTITATIVE HCG IN EARLY PREGNANCY: ICD-10-CM

## 2020-10-07 PROCEDURE — 99213 OFFICE O/P EST LOW 20 MIN: CPT | Performed by: OBSTETRICS & GYNECOLOGY

## 2020-10-07 PROCEDURE — 76830 TRANSVAGINAL US NON-OB: CPT | Performed by: OBSTETRICS & GYNECOLOGY

## 2020-10-07 NOTE — PROGRESS NOTES
10/7/2020      Patient:  Candace Mustafa   MR#:[id number]    Office note    Chief Complaint   Patient presents with   • Miscarriage     U/S today for miscarriage.  Lmp 2020. She saw Dr. Ritter on 2020.   She is still experiencing bleeding.        Subjective     History of Present Illness  35 y.o. female  presents for follow-up on bleeding during pregnancy with abnormally increasing hCGs.  Since being seen by Dr. Ritter on 2020 the patient had an episode of fairly heavy bleeding and passed tissue.  Ultrasound today shows no evidence of an intrauterine gestational sac and the endometrium is basically normal with some minimal echogenic material.  The patient's bleeding and cramping are minimal.      Patient Active Problem List   Diagnosis   • Inappropriate change in quantitative hCG in early pregnancy   • Spotting in early pregnancy       Past Medical History:   Diagnosis Date   • Anemia        Past Surgical History:   Procedure Laterality Date   •  SECTION Bilateral 2016    Procedure:  SECTION PRIMARY;  Surgeon: Ann Mitchell MD;  Location: [facility] LABOR DELIVERY;  Service:    •  SECTION N/A 2017    Procedure:  SECTION REPEAT;  Surgeon: Stew Castellanos MD;  Location: [facility] LABOR DELIVERY;  Service:    • DILATATION AND CURETTAGE     • WISDOM TOOTH EXTRACTION         Obstetric History:  OB History        5    Para   4    Term   4       0    AB   1    Living   4       SAB   1    TAB   0    Ectopic   0    Molar        Multiple   0    Live Births   4          Obstetric Comments   20 - Very small hypoechoic sac noted in myometrium - [facility]             Menstrual History:     No LMP recorded.       # 1 - Date: 12, Sex: None, Weight: None, GA: 9w0d, Delivery: None, Apgar1: None, Apgar5: None, Living: None, Birth Comments: with D&C    # 2 - Date: 01/15/13, Sex: Female, Weight: 3685 g (8 lb 2 oz), GA: 40w0d, Delivery: Vaginal,  Spontaneous, Apgar1: 8, Apgar5: 9, Living: Living, Birth Comments: no [id number]    # 3 - Date: 14, Sex: Female, Weight: 3827 g (8 lb 7 oz), GA: 38w1d, Delivery: Vaginal, Spontaneous, Apgar1: 7, Apgar5: 9, Living: Living, Birth Comments: Shoulder dystocia    # 4 - Date: 16, Sex: Male, Weight: 4255 g (9 lb 6.1 oz), GA: 39w2d, Delivery: , Low Transverse, Apgar1: 8, Apgar5: 8, Living: Living, Birth Comments: No complications [facility]    # 5 - Date: 17, Sex: Male, Weight: 3535 g (7 lb 12.7 oz), GA: 39w0d, Delivery: , Low Transverse, Apgar1: 8, Apgar5: 9, Living: Living, Birth Comments: None      Family History   Problem Relation Age of Onset   • Breast cancer Mother 54   • No Known Problems Father    • No Known Problems Brother    • No Known Problems Son    • No Known Problems Daughter    • Throat cancer Paternal Grandfather    • No Known Problems Paternal Grandmother    • Dementia Maternal Grandmother    • No Known Problems Maternal Grandfather    • No Known Problems Daughter    • No Known Problems Son        Social History     Tobacco Use   • Smoking status: Never Smoker   • Smokeless tobacco: Never Used   Substance Use Topics   • Alcohol use: Yes     Comment: occasional   • Drug use: No       Sulfa antibiotics      Current Outpatient Medications:   •  Prenatal Vit-Fe Fumarate-FA (prenatal vitamin 27-0.8) 27-0.8 MG tablet tablet, Take 1 tablet by mouth Daily., Disp: , Rfl:     The following portions of the patient's history were reviewed and updated as appropriate: allergies, current medications, past family history, past medical history, past social history, past surgical history and problem list.    Review of Systems   Constitutional: Negative.    Respiratory: Negative.    Cardiovascular: Negative.    Gastrointestinal: Negative.    Genitourinary: Positive for vaginal bleeding.   Psychiatric/Behavioral: Negative.        BP Readings from Last 3 Encounters:   10/07/20 118/78  "  20 122/78   09/15/20 122/62      Wt Readings from Last 3 Encounters:   10/07/20 67.6 kg (149 lb)   20 67.6 kg (149 lb)   09/15/20 68 kg (150 lb)      BMI: Estimated body mass index is 24.05 kg/m² as calculated from the following:    Height as of this encounter: 167.6 cm (66\").    Weight as of this encounter: 67.6 kg (149 lb).  BSA: Estimated body surface area is 1.77 meters squared as calculated from the following:    Height as of this encounter: 167.6 cm (66\").    Weight as of this encounter: 67.6 kg (149 lb).    Objective   Physical Exam  Vitals signs and nursing note reviewed.   Constitutional:       Appearance: She is well-developed.   HENT:      Head: Normocephalic and atraumatic.   Cardiovascular:      Rate and Rhythm: Normal rate.   Pulmonary:      Effort: Pulmonary effort is normal.   Abdominal:      General: Bowel sounds are normal. There is no distension.      Palpations: Abdomen is soft.      Tenderness: There is no abdominal tenderness.   Genitourinary:     Comments: Scant minimal bleeding today  Skin:     General: Skin is warm and dry.   Neurological:      Mental Status: She is alert and oriented to person, place, and time.   Psychiatric:         Behavior: Behavior normal.         Thought Content: Thought content normal.         Judgment: Judgment normal.         Assessment/Plan     Candace was seen today for miscarriage.    Diagnoses and all orders for this visit:    Complete     Inappropriate change in quantitative hCG in early pregnancy          Return if symptoms worsen or fail to improve.        Stew Castellanos MD   10/7/2020 14:33 EDT  "

## 2021-01-18 ENCOUNTER — OFFICE VISIT (OUTPATIENT)
Dept: OBSTETRICS AND GYNECOLOGY | Age: 36
End: 2021-01-18

## 2021-01-18 VITALS
BODY MASS INDEX: 25.55 KG/M2 | HEIGHT: 66 IN | SYSTOLIC BLOOD PRESSURE: 104 MMHG | WEIGHT: 159 LBS | DIASTOLIC BLOOD PRESSURE: 58 MMHG

## 2021-01-18 DIAGNOSIS — Z13.89 SCREENING FOR HEMATURIA OR PROTEINURIA: ICD-10-CM

## 2021-01-18 DIAGNOSIS — Z32.00 ENCOUNTER FOR CONFIRMATION OF PREGNANCY TEST RESULT WITH PHYSICAL EXAMINATION: ICD-10-CM

## 2021-01-18 DIAGNOSIS — Z34.80 SUPERVISION OF OTHER NORMAL PREGNANCY: Primary | ICD-10-CM

## 2021-01-18 LAB
B-HCG UR QL: POSITIVE
BILIRUB BLD-MCNC: NEGATIVE MG/DL
CLARITY, POC: ABNORMAL
COLOR UR: YELLOW
GLUCOSE UR STRIP-MCNC: NEGATIVE MG/DL
INTERNAL NEGATIVE CONTROL: NEGATIVE
INTERNAL POSITIVE CONTROL: POSITIVE
KETONES UR QL: NEGATIVE
LEUKOCYTE EST, POC: ABNORMAL
Lab: ABNORMAL
NITRITE UR-MCNC: NEGATIVE MG/ML
PH UR: 8.5 [PH] (ref 5–8)
PROT UR STRIP-MCNC: NEGATIVE MG/DL
RBC # UR STRIP: NEGATIVE /UL
SP GR UR: 1.02 (ref 1–1.03)
UROBILINOGEN UR QL: NORMAL

## 2021-01-18 PROCEDURE — 81025 URINE PREGNANCY TEST: CPT | Performed by: NURSE PRACTITIONER

## 2021-01-18 PROCEDURE — 99213 OFFICE O/P EST LOW 20 MIN: CPT | Performed by: NURSE PRACTITIONER

## 2021-01-18 NOTE — PROGRESS NOTES
[facility]  Routine Annual Visit    2021    Patient: Candace Mustafa          MR#:[id number]      History of Present Illness    35 y.o. female  who presents for confirmation of pregnancy    US confirms viable IUP at 7w5d. .  This is Candace's 7th pregnancy. Hx 2 , 2 , 2 SAB.   Taking only a prenatal vitamin  Feeling overall and excited about the pregnancy    No complaints today    Patient's last menstrual period was 2020 (exact date).  Obstetric History:  OB History        7    Para   4    Term   4       0    AB   2    Living   4       SAB   2    TAB   0    Ectopic   0    Molar        Multiple   0    Live Births   4               Menstrual History:     Patient's last menstrual period was 2020 (exact date).       Sexual History:       ________________________________________  Patient Active Problem List   Diagnosis   • Supervision of other normal pregnancy, antepartum       Past Medical History:   Diagnosis Date   • Anemia        Past Surgical History:   Procedure Laterality Date   •  SECTION Bilateral 2016    Procedure:  SECTION PRIMARY;  Surgeon: Ann Mitchell MD;  Location: [facility] LABOR DELIVERY;  Service:    •  SECTION N/A 2017    Procedure:  SECTION REPEAT;  Surgeon: Stew Castellanos MD;  Location: [facility] LABOR DELIVERY;  Service:    • DILATATION AND CURETTAGE     • WISDOM TOOTH EXTRACTION         Social History     Tobacco Use   Smoking Status Never Smoker   Smokeless Tobacco Never Used       Family History   Problem Relation Age of Onset   • Breast cancer Mother 54   • No Known Problems Father    • No Known Problems Brother    • No Known Problems Son    • No Known Problems Daughter    • Throat cancer Paternal Grandfather    • No Known Problems Paternal Grandmother    • Dementia Maternal Grandmother    • No Known Problems Maternal Grandfather    • No Known Problems Daughter    • No Known  "Problems Son        Prior to Admission medications    Medication Sig Start Date End Date Taking? Authorizing Provider   Prenatal Vit-Fe Fumarate-FA (prenatal vitamin 27-0.8) 27-0.8 MG tablet tablet Take 1 tablet by mouth Daily.   Yes Provider, MD Jelani     ________________________________________      The following portions of the patient's history were reviewed and updated as appropriate: allergies, current medications, past family history, past medical history, past social history, past surgical history and problem list.    Review of Systems   Constitutional: Positive for fatigue. Negative for chills and fever.   Gastrointestinal: Negative for abdominal distention, abdominal pain, constipation, nausea and vomiting.   Genitourinary: Negative for decreased urine volume, dyspareunia, flank pain, frequency, genital sores, hematuria, menstrual problem, pelvic pain, urgency, vaginal bleeding, vaginal discharge and vaginal pain.       Objective   Physical Exam  Constitutional:       Appearance: Normal appearance. She is not ill-appearing.   Cardiovascular:      Rate and Rhythm: Normal rate and regular rhythm.   Pulmonary:      Effort: Pulmonary effort is normal.      Breath sounds: Normal breath sounds.   Skin:     General: Skin is warm and dry.   Neurological:      General: No focal deficit present.      Mental Status: She is alert and oriented to person, place, and time.   Psychiatric:         Mood and Affect: Mood normal.         Behavior: Behavior normal.         /58   Ht 167.6 cm (66\")   Wt 72.1 kg (159 lb)   LMP 11/25/2020 (Exact Date)   Breastfeeding No   BMI 25.66 kg/m²    BP Readings from Last 3 Encounters:   01/18/21 104/58   10/07/20 118/78   09/29/20 122/78      Wt Readings from Last 3 Encounters:   01/18/21 72.1 kg (159 lb)   10/07/20 67.6 kg (149 lb)   09/29/20 67.6 kg (149 lb)        BMI: Estimated body mass index is 25.66 kg/m² as calculated from the following:    Height as of this " "encounter: 167.6 cm (66\").    Weight as of this encounter: 72.1 kg (159 lb).            Assessment:    1. Viable IUP at 7w5d  2. Early pregnancy counseling provided   Patient is taking Prenatal vitamins  Problem list reviewed and updated  Reviewed routine prenatal care with the office to include but not limited to expected weight gain during pregnancy, Tylenol products are fine, avoid aspirin and ibuprofen; Zika (travel restrictions/ok to use insect repellant); not to change cat litter; food restrictions; avoidance of alcohol, tobacco, drugs and saunas/hot tubs.   All questions answered.  3. SAB warnings    RTO 4 weeks for OB intake    Plan:    []  Rx:   []  Mammogram request made  []  PAP done  []  Occult fecal blood test ([facility])  []  Labs:   []  GC/Chl/TV  []  DEXA scan   []  Referral for colonoscopy:           Araceli Burgos, APRN  1/18/2021 13:26 EST  "

## 2021-01-19 LAB
ABO GROUP BLD: NORMAL
BACTERIA UR CULT: NORMAL
BACTERIA UR CULT: NORMAL
BLD GP AB SCN SERPL QL: NEGATIVE
ERYTHROCYTE [DISTWIDTH] IN BLOOD BY AUTOMATED COUNT: 12.5 % (ref 11.7–15.4)
HBA1C MFR BLD: 4.9 % (ref 4.8–5.6)
HBV SURFACE AG SERPL QL IA: NEGATIVE
HCT VFR BLD AUTO: 39.1 % (ref 34–46.6)
HCV AB S/CO SERPL IA: <0.1 S/CO RATIO (ref 0–0.9)
HGB A MFR BLD: 97.5 % (ref 96.4–98.8)
HGB A2 MFR BLD COLUMN CHROM: 2.5 % (ref 1.8–3.2)
HGB BLD-MCNC: 13.2 G/DL (ref 11.1–15.9)
HGB C MFR BLD: 0 %
HGB F MFR BLD: 0 % (ref 0–2)
HGB FRACT BLD-IMP: NORMAL
HGB S BLD QL SOLY: NEGATIVE
HGB S MFR BLD: 0 %
HIV 1+2 AB+HIV1 P24 AG SERPL QL IA: NON REACTIVE
MCH RBC QN AUTO: 30.8 PG (ref 26.6–33)
MCHC RBC AUTO-ENTMCNC: 33.8 G/DL (ref 31.5–35.7)
MCV RBC AUTO: 91 FL (ref 79–97)
PLATELET # BLD AUTO: 253 X10E3/UL (ref 150–450)
RBC # BLD AUTO: 4.28 X10E6/UL (ref 3.77–5.28)
RH BLD: POSITIVE
RPR SER QL: NON REACTIVE
RUBV IGG SERPL IA-ACNC: 0.92 INDEX
VZV IGG SER IA-ACNC: 1437 INDEX
WBC # BLD AUTO: 10.2 X10E3/UL (ref 3.4–10.8)

## 2021-01-20 PROBLEM — O09.899 RUBELLA NON-IMMUNE STATUS, ANTEPARTUM: Status: ACTIVE | Noted: 2021-01-20

## 2021-01-20 PROBLEM — Z28.39 RUBELLA NON-IMMUNE STATUS, ANTEPARTUM: Status: ACTIVE | Noted: 2021-01-20

## 2021-01-21 LAB
C TRACH RRNA SPEC QL NAA+PROBE: NEGATIVE
N GONORRHOEA RRNA SPEC QL NAA+PROBE: NEGATIVE
T VAGINALIS DNA SPEC QL NAA+PROBE: NEGATIVE

## 2021-02-10 ENCOUNTER — INITIAL PRENATAL (OUTPATIENT)
Dept: OBSTETRICS AND GYNECOLOGY | Age: 36
End: 2021-02-10

## 2021-02-10 VITALS — WEIGHT: 158 LBS | SYSTOLIC BLOOD PRESSURE: 114 MMHG | BODY MASS INDEX: 25.5 KG/M2 | DIASTOLIC BLOOD PRESSURE: 68 MMHG

## 2021-02-10 DIAGNOSIS — O09.899 RUBELLA NON-IMMUNE STATUS, ANTEPARTUM: ICD-10-CM

## 2021-02-10 DIAGNOSIS — Z3A.11 11 WEEKS GESTATION OF PREGNANCY: Primary | ICD-10-CM

## 2021-02-10 DIAGNOSIS — Z98.891 PREVIOUS CESAREAN SECTION: ICD-10-CM

## 2021-02-10 DIAGNOSIS — Z13.89 SCREENING FOR BLOOD OR PROTEIN IN URINE: ICD-10-CM

## 2021-02-10 DIAGNOSIS — Z28.39 RUBELLA NON-IMMUNE STATUS, ANTEPARTUM: ICD-10-CM

## 2021-02-10 DIAGNOSIS — Z34.80 SUPERVISION OF OTHER NORMAL PREGNANCY, ANTEPARTUM: ICD-10-CM

## 2021-02-10 DIAGNOSIS — O09.529 ANTEPARTUM MULTIGRAVIDA OF ADVANCED MATERNAL AGE: ICD-10-CM

## 2021-02-10 LAB
BILIRUB BLD-MCNC: NEGATIVE MG/DL
CLARITY, POC: CLEAR
COLOR UR: YELLOW
GLUCOSE UR STRIP-MCNC: NEGATIVE MG/DL
KETONES UR QL: ABNORMAL
LEUKOCYTE EST, POC: ABNORMAL
NITRITE UR-MCNC: NEGATIVE MG/ML
PH UR: 6 [PH] (ref 5–8)
PROT UR STRIP-MCNC: NEGATIVE MG/DL
RBC # UR STRIP: NEGATIVE /UL
SP GR UR: 1.02 (ref 1–1.03)
UROBILINOGEN UR QL: NORMAL

## 2021-02-10 PROCEDURE — 0502F SUBSEQUENT PRENATAL CARE: CPT | Performed by: OBSTETRICS & GYNECOLOGY

## 2021-02-10 NOTE — PROGRESS NOTES
Chief Complaint   Patient presents with   • Routine Prenatal Visit     no cc      The patient presents for routine OB check feeling well without complaints.  NIPT will be collected today.    Return in about 4 weeks (around 3/10/2021) for ob check.

## 2021-03-08 PROBLEM — Z13.79 GENETIC SCREENING: Status: ACTIVE | Noted: 2021-03-08

## 2021-03-11 ENCOUNTER — ROUTINE PRENATAL (OUTPATIENT)
Dept: OBSTETRICS AND GYNECOLOGY | Age: 36
End: 2021-03-11

## 2021-03-11 VITALS — BODY MASS INDEX: 25.89 KG/M2 | DIASTOLIC BLOOD PRESSURE: 70 MMHG | WEIGHT: 160.4 LBS | SYSTOLIC BLOOD PRESSURE: 118 MMHG

## 2021-03-11 DIAGNOSIS — Z34.80 SUPERVISION OF OTHER NORMAL PREGNANCY, ANTEPARTUM: Primary | ICD-10-CM

## 2021-03-11 DIAGNOSIS — Z13.89 SCREENING FOR BLOOD OR PROTEIN IN URINE: ICD-10-CM

## 2021-03-11 PROCEDURE — 0502F SUBSEQUENT PRENATAL CARE: CPT | Performed by: NURSE PRACTITIONER

## 2021-03-11 NOTE — PROGRESS NOTES
Candace present for routine OB visit without complaint  Fetus active on handheld doppler with fetal heart tones at 150  Carrier screening negative but anastacia for chromosomal disorders was not run- patient counseled and she does not wish she have redrawn    Anatomy scan and OB visit in 3 weeks.

## 2021-03-31 ENCOUNTER — ROUTINE PRENATAL (OUTPATIENT)
Dept: OBSTETRICS AND GYNECOLOGY | Age: 36
End: 2021-03-31

## 2021-03-31 VITALS — DIASTOLIC BLOOD PRESSURE: 60 MMHG | SYSTOLIC BLOOD PRESSURE: 116 MMHG | WEIGHT: 163 LBS | BODY MASS INDEX: 26.31 KG/M2

## 2021-03-31 DIAGNOSIS — O44.20 MARGINAL PLACENTA PREVIA: ICD-10-CM

## 2021-03-31 DIAGNOSIS — Z13.89 SCREENING FOR BLOOD OR PROTEIN IN URINE: ICD-10-CM

## 2021-03-31 DIAGNOSIS — Z98.891 PREVIOUS CESAREAN SECTION: ICD-10-CM

## 2021-03-31 DIAGNOSIS — Z3A.18 18 WEEKS GESTATION OF PREGNANCY: Primary | ICD-10-CM

## 2021-03-31 DIAGNOSIS — O09.522 MULTIGRAVIDA OF ADVANCED MATERNAL AGE IN SECOND TRIMESTER: ICD-10-CM

## 2021-03-31 DIAGNOSIS — O09.899 RUBELLA NON-IMMUNE STATUS, ANTEPARTUM: ICD-10-CM

## 2021-03-31 DIAGNOSIS — Z28.39 RUBELLA NON-IMMUNE STATUS, ANTEPARTUM: ICD-10-CM

## 2021-03-31 DIAGNOSIS — Z34.80 SUPERVISION OF OTHER NORMAL PREGNANCY, ANTEPARTUM: ICD-10-CM

## 2021-03-31 DIAGNOSIS — O71.03: ICD-10-CM

## 2021-03-31 LAB
AFP INTERP SERPL-IMP: NORMAL
BILIRUB BLD-MCNC: NEGATIVE MG/DL
CLARITY, POC: CLEAR
COLOR UR: YELLOW
GLUCOSE UR STRIP-MCNC: NEGATIVE MG/DL
KETONES UR QL: NEGATIVE
LEUKOCYTE EST, POC: ABNORMAL
NITRITE UR-MCNC: NEGATIVE MG/ML
PH UR: 7.5 [PH] (ref 5–8)
PROT UR STRIP-MCNC: NEGATIVE MG/DL
RBC # UR STRIP: NEGATIVE /UL
SP GR UR: 1.02 (ref 1–1.03)
UROBILINOGEN UR QL: NORMAL

## 2021-03-31 PROCEDURE — 0502F SUBSEQUENT PRENATAL CARE: CPT | Performed by: OBSTETRICS & GYNECOLOGY

## 2021-03-31 NOTE — PROGRESS NOTES
Chief Complaint   Patient presents with   • Routine Prenatal Visit     no cc, anatomy scan        HPI: 35 y.o.  at 18w0d presents for routine OB check feeling well without complaints.  Anatomic survey is normal and completed today.  The patient has a marginal posterior placenta previa.    The patient also had a prior scar separation and a uterine window at delivery of the last child.  We discussed evaluation of the uterine scar with possible modification of her scheduled  section    AFP is declined today    Vitals:    21 1432   BP: 116/60   Weight: 73.9 kg (163 lb)       Review of systems:     Gen: negative  CV:     negative  GI: negative  :   negative  MS:    negative  Neuro: negative and denies headaches and visual changes   Pul: negative      A/P  1. Intrauterine pregnancy at 18w0d   2. Pregnancy Risk:  HIGH RISK        Diagnoses and all orders for this visit:    1. 18 weeks gestation of pregnancy (Primary)    2. Screening for blood or protein in urine  -     POC Urinalysis Dipstick    3. Rubella non-immune status, antepartum    4. Previous  section    5. Marginal placenta previa    6. Multigravida of advanced maternal age in second trimester    7. Supervision of other normal pregnancy, antepartum    8. 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      -----------------------  PLAN:   Return in about 4 weeks (around 2021) for ob check.      Stew Castellanos MD  3/31/2021 14:47 EDT

## 2021-04-16 ENCOUNTER — BULK ORDERING (OUTPATIENT)
Dept: CASE MANAGEMENT | Facility: OTHER | Age: 36
End: 2021-04-16

## 2021-04-16 DIAGNOSIS — Z23 IMMUNIZATION DUE: ICD-10-CM

## 2021-04-28 ENCOUNTER — ROUTINE PRENATAL (OUTPATIENT)
Dept: OBSTETRICS AND GYNECOLOGY | Age: 36
End: 2021-04-28

## 2021-04-28 VITALS — SYSTOLIC BLOOD PRESSURE: 116 MMHG | DIASTOLIC BLOOD PRESSURE: 70 MMHG | BODY MASS INDEX: 27.28 KG/M2 | WEIGHT: 169 LBS

## 2021-04-28 DIAGNOSIS — O09.522 MULTIGRAVIDA OF ADVANCED MATERNAL AGE IN SECOND TRIMESTER: ICD-10-CM

## 2021-04-28 DIAGNOSIS — Z28.39 RUBELLA NON-IMMUNE STATUS, ANTEPARTUM: ICD-10-CM

## 2021-04-28 DIAGNOSIS — Z13.89 SCREENING FOR BLOOD OR PROTEIN IN URINE: ICD-10-CM

## 2021-04-28 DIAGNOSIS — Z3A.22 22 WEEKS GESTATION OF PREGNANCY: Primary | ICD-10-CM

## 2021-04-28 DIAGNOSIS — O09.899 RUBELLA NON-IMMUNE STATUS, ANTEPARTUM: ICD-10-CM

## 2021-04-28 DIAGNOSIS — Z98.891 PREVIOUS CESAREAN SECTION: ICD-10-CM

## 2021-04-28 DIAGNOSIS — Z34.80 SUPERVISION OF OTHER NORMAL PREGNANCY, ANTEPARTUM: ICD-10-CM

## 2021-04-28 DIAGNOSIS — O71.03: ICD-10-CM

## 2021-04-28 LAB
BILIRUB BLD-MCNC: NEGATIVE MG/DL
CLARITY, POC: CLEAR
COLOR UR: YELLOW
GLUCOSE UR STRIP-MCNC: NEGATIVE MG/DL
KETONES UR QL: ABNORMAL
LEUKOCYTE EST, POC: NEGATIVE
NITRITE UR-MCNC: NEGATIVE MG/ML
PH UR: 7 [PH] (ref 5–8)
PROT UR STRIP-MCNC: NEGATIVE MG/DL
RBC # UR STRIP: NEGATIVE /UL
SP GR UR: 1.02 (ref 1–1.03)
UROBILINOGEN UR QL: NORMAL

## 2021-04-28 PROCEDURE — 0502F SUBSEQUENT PRENATAL CARE: CPT | Performed by: OBSTETRICS & GYNECOLOGY

## 2021-04-28 NOTE — PROGRESS NOTES
Chief Complaint   Patient presents with   • Routine Prenatal Visit     no cc      The patient presents for routine OB check feeling well without complaints.    Return in about 4 weeks (around 5/26/2021) for OB check and GTT.

## 2021-06-02 ENCOUNTER — ROUTINE PRENATAL (OUTPATIENT)
Dept: OBSTETRICS AND GYNECOLOGY | Age: 36
End: 2021-06-02

## 2021-06-02 VITALS — SYSTOLIC BLOOD PRESSURE: 118 MMHG | WEIGHT: 172 LBS | DIASTOLIC BLOOD PRESSURE: 72 MMHG | BODY MASS INDEX: 27.76 KG/M2

## 2021-06-02 DIAGNOSIS — Z13.89 SCREENING FOR BLOOD OR PROTEIN IN URINE: ICD-10-CM

## 2021-06-02 DIAGNOSIS — Z3A.27 27 WEEKS GESTATION OF PREGNANCY: Primary | ICD-10-CM

## 2021-06-02 DIAGNOSIS — Z13.1 SCREENING FOR DIABETES MELLITUS: ICD-10-CM

## 2021-06-02 DIAGNOSIS — Z28.39 RUBELLA NON-IMMUNE STATUS, ANTEPARTUM: ICD-10-CM

## 2021-06-02 DIAGNOSIS — O44.20 MARGINAL PLACENTA PREVIA: ICD-10-CM

## 2021-06-02 DIAGNOSIS — Z98.891 PREVIOUS CESAREAN SECTION: ICD-10-CM

## 2021-06-02 DIAGNOSIS — Z34.80 SUPERVISION OF OTHER NORMAL PREGNANCY, ANTEPARTUM: ICD-10-CM

## 2021-06-02 DIAGNOSIS — O09.899 RUBELLA NON-IMMUNE STATUS, ANTEPARTUM: ICD-10-CM

## 2021-06-02 DIAGNOSIS — Z13.0 SCREENING FOR DEFICIENCY ANEMIA: ICD-10-CM

## 2021-06-02 DIAGNOSIS — O09.523 MULTIGRAVIDA OF ADVANCED MATERNAL AGE IN THIRD TRIMESTER: ICD-10-CM

## 2021-06-02 DIAGNOSIS — O71.03: ICD-10-CM

## 2021-06-02 LAB
BILIRUB BLD-MCNC: NEGATIVE MG/DL
CLARITY, POC: CLEAR
COLOR UR: YELLOW
GLUCOSE 1H P 50 G GLC PO SERPL-MCNC: 94 MG/DL (ref 65–139)
GLUCOSE UR STRIP-MCNC: NEGATIVE MG/DL
HCT VFR BLD AUTO: 34.2 % (ref 34–46.6)
HGB BLD-MCNC: 11.6 G/DL (ref 12–15.9)
KETONES UR QL: NEGATIVE
LEUKOCYTE EST, POC: ABNORMAL
NITRITE UR-MCNC: NEGATIVE MG/ML
PH UR: 7 [PH] (ref 5–8)
PROT UR STRIP-MCNC: NEGATIVE MG/DL
RBC # UR STRIP: NEGATIVE /UL
SP GR UR: 1.02 (ref 1–1.03)
UROBILINOGEN UR QL: NORMAL

## 2021-06-02 PROCEDURE — 0502F SUBSEQUENT PRENATAL CARE: CPT | Performed by: OBSTETRICS & GYNECOLOGY

## 2021-06-02 RX ORDER — OMEPRAZOLE 40 MG/1
40 CAPSULE, DELAYED RELEASE ORAL DAILY
Qty: 30 CAPSULE | Refills: 5 | Status: SHIPPED | OUTPATIENT
Start: 2021-06-02 | End: 2023-04-05

## 2021-06-02 NOTE — PROGRESS NOTES
Chief Complaint   Patient presents with   • Routine Prenatal Visit     pt c/o worsening reflux & veins on her legs becoming more prominent, she reports even having one that start on her labia and goes to her butt     Patient presents for routine OB check with worsening symptoms of lower extremity varicosities and labial varicosity in addition to worsening reflux symptoms.    The pregnancy is complicated by a posterior marginal previa that needs follow-up.  The patient's had no bleeding.    The pregnancy is also complicated by advanced maternal age.    Plan:  Repeat ultrasound to follow-up on posterior previa  Suggest aspirin daily for varicose veins  Rx for omeprazole in addition to Tums  CS scheduled for 8/26/21 undecided on tubal   Return in about 2 weeks (around 6/16/2021) for Recheck.

## 2021-06-16 ENCOUNTER — ROUTINE PRENATAL (OUTPATIENT)
Dept: OBSTETRICS AND GYNECOLOGY | Age: 36
End: 2021-06-16

## 2021-06-16 VITALS — SYSTOLIC BLOOD PRESSURE: 118 MMHG | WEIGHT: 176 LBS | BODY MASS INDEX: 28.41 KG/M2 | DIASTOLIC BLOOD PRESSURE: 70 MMHG

## 2021-06-16 DIAGNOSIS — O71.03: ICD-10-CM

## 2021-06-16 DIAGNOSIS — Z13.89 SCREENING FOR HEMATURIA OR PROTEINURIA: ICD-10-CM

## 2021-06-16 DIAGNOSIS — Z98.891 PREVIOUS CESAREAN SECTION: ICD-10-CM

## 2021-06-16 DIAGNOSIS — Z34.80 SUPERVISION OF OTHER NORMAL PREGNANCY, ANTEPARTUM: ICD-10-CM

## 2021-06-16 DIAGNOSIS — O09.523 MULTIGRAVIDA OF ADVANCED MATERNAL AGE IN THIRD TRIMESTER: ICD-10-CM

## 2021-06-16 DIAGNOSIS — Z3A.29 29 WEEKS GESTATION OF PREGNANCY: Primary | ICD-10-CM

## 2021-06-16 LAB
BILIRUB BLD-MCNC: NEGATIVE MG/DL
GLUCOSE UR STRIP-MCNC: NEGATIVE MG/DL
KETONES UR QL: NEGATIVE
LEUKOCYTE EST, POC: ABNORMAL
NITRITE UR-MCNC: NEGATIVE MG/ML
PH UR: 7 [PH] (ref 5–8)
PROT UR STRIP-MCNC: NEGATIVE MG/DL
RBC # UR STRIP: NEGATIVE /UL
SP GR UR: 1.02 (ref 1–1.03)
UROBILINOGEN UR QL: NORMAL

## 2021-06-16 PROCEDURE — 0502F SUBSEQUENT PRENATAL CARE: CPT | Performed by: OBSTETRICS & GYNECOLOGY

## 2021-06-16 NOTE — PROGRESS NOTES
Chief Complaint   Patient presents with   • Routine Prenatal Visit     29w0d, no complaints today      Patient presents without major complaints.  She reports gastroesophageal reflux is improved with omeprazole significantly.  Varicose veins have not changed.  The patient did not fill the prescription for aspirin as instructed that she thought the dose might be too high.  She is instructed to start the 81 mg of aspirin.    No follow-ups on file.

## 2021-06-30 ENCOUNTER — ROUTINE PRENATAL (OUTPATIENT)
Dept: OBSTETRICS AND GYNECOLOGY | Age: 36
End: 2021-06-30

## 2021-06-30 VITALS — WEIGHT: 177.4 LBS | BODY MASS INDEX: 28.63 KG/M2 | SYSTOLIC BLOOD PRESSURE: 112 MMHG | DIASTOLIC BLOOD PRESSURE: 68 MMHG

## 2021-06-30 DIAGNOSIS — Z13.89 SCREENING FOR HEMATURIA OR PROTEINURIA: ICD-10-CM

## 2021-06-30 DIAGNOSIS — Z34.80 SUPERVISION OF OTHER NORMAL PREGNANCY, ANTEPARTUM: Primary | ICD-10-CM

## 2021-06-30 LAB
BILIRUB BLD-MCNC: NEGATIVE MG/DL
GLUCOSE UR STRIP-MCNC: NEGATIVE MG/DL
KETONES UR QL: NEGATIVE
LEUKOCYTE EST, POC: ABNORMAL
NITRITE UR-MCNC: NEGATIVE MG/ML
PH UR: 8.5 [PH] (ref 5–8)
PROT UR STRIP-MCNC: NEGATIVE MG/DL
RBC # UR STRIP: NEGATIVE /UL
SP GR UR: 1.02 (ref 1–1.03)
UROBILINOGEN UR QL: NORMAL

## 2021-06-30 PROCEDURE — 0502F SUBSEQUENT PRENATAL CARE: CPT | Performed by: NURSE PRACTITIONER

## 2021-06-30 RX ORDER — ASPIRIN 81 MG/1
81 TABLET ORAL DAILY
COMMUNITY
End: 2022-11-14

## 2021-06-30 NOTE — PROGRESS NOTES
Candace presents for routine OB visit  No complaints, feeling well  Denies ctx, lof, bleeding  Active fetus    RTO 2 weeks  Call for changes or concerns  PTL warnings

## 2021-07-21 ENCOUNTER — ROUTINE PRENATAL (OUTPATIENT)
Dept: OBSTETRICS AND GYNECOLOGY | Age: 36
End: 2021-07-21

## 2021-07-21 VITALS — DIASTOLIC BLOOD PRESSURE: 64 MMHG | SYSTOLIC BLOOD PRESSURE: 110 MMHG | WEIGHT: 179 LBS | BODY MASS INDEX: 28.89 KG/M2

## 2021-07-21 DIAGNOSIS — Z3A.34 34 WEEKS GESTATION OF PREGNANCY: ICD-10-CM

## 2021-07-21 DIAGNOSIS — O09.523 MULTIGRAVIDA OF ADVANCED MATERNAL AGE IN THIRD TRIMESTER: ICD-10-CM

## 2021-07-21 DIAGNOSIS — Z98.891 PREVIOUS CESAREAN SECTION: ICD-10-CM

## 2021-07-21 DIAGNOSIS — O09.899 RUBELLA NON-IMMUNE STATUS, ANTEPARTUM: ICD-10-CM

## 2021-07-21 DIAGNOSIS — Z28.39 RUBELLA NON-IMMUNE STATUS, ANTEPARTUM: ICD-10-CM

## 2021-07-21 DIAGNOSIS — O71.03: ICD-10-CM

## 2021-07-21 DIAGNOSIS — Z13.89 SCREENING FOR BLOOD OR PROTEIN IN URINE: Primary | ICD-10-CM

## 2021-07-21 DIAGNOSIS — Z34.03 ENCOUNTER FOR SUPERVISION OF NORMAL FIRST PREGNANCY IN THIRD TRIMESTER: ICD-10-CM

## 2021-07-21 LAB
BILIRUB BLD-MCNC: NEGATIVE MG/DL
CLARITY, POC: CLEAR
COLOR UR: YELLOW
GLUCOSE UR STRIP-MCNC: NEGATIVE MG/DL
KETONES UR QL: NEGATIVE
LEUKOCYTE EST, POC: ABNORMAL
NITRITE UR-MCNC: NEGATIVE MG/ML
PH UR: 7 [PH] (ref 5–8)
PROT UR STRIP-MCNC: NEGATIVE MG/DL
RBC # UR STRIP: NEGATIVE /UL
SP GR UR: 1.02 (ref 1–1.03)
UROBILINOGEN UR QL: NORMAL

## 2021-07-21 PROCEDURE — 0502F SUBSEQUENT PRENATAL CARE: CPT | Performed by: PHYSICIAN ASSISTANT

## 2021-07-21 NOTE — PROGRESS NOTES
Chief Complaint   Patient presents with   • Routine Prenatal Visit       HPI: 36 y.o.  at 34w0d gestation   Pt is doing well  Has good FM  BP wnl, 110/64  Pt is AMA with h/o uterine dehiscence, will plan BPP at next visit        Vitals:    21 1413   BP: 110/64   Weight: 81.2 kg (179 lb)       ROS:  GI:  Negative  : na  Pulmonary: Negative     A/P  1. Intrauterine pregnancy at 34w0d   2. Pregnancy Risk:  HIGH RISK    Diagnoses and all orders for this visit:    1. Screening for blood or protein in urine (Primary)  -     POC Urinalysis Dipstick    2. 34 weeks gestation of pregnancy  -     POC Urinalysis Dipstick    3. Rubella non-immune status, antepartum    4. Previous  section    5. Multigravida of advanced maternal age in third trimester    6. History of 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 before onset of labor during third trimester of pregnancy    7. Encounter for supervision of normal first pregnancy in third trimester        -----------------------  PLAN:   Return if symptoms worsen or fail to improve.      MARIA E Polanco  2021 16:01 EDT

## 2021-07-28 ENCOUNTER — ROUTINE PRENATAL (OUTPATIENT)
Dept: OBSTETRICS AND GYNECOLOGY | Age: 36
End: 2021-07-28

## 2021-07-28 VITALS — SYSTOLIC BLOOD PRESSURE: 112 MMHG | DIASTOLIC BLOOD PRESSURE: 70 MMHG | WEIGHT: 180 LBS | BODY MASS INDEX: 29.05 KG/M2

## 2021-07-28 DIAGNOSIS — O09.523 MULTIGRAVIDA OF ADVANCED MATERNAL AGE IN THIRD TRIMESTER: ICD-10-CM

## 2021-07-28 DIAGNOSIS — Z13.89 SCREENING FOR BLOOD OR PROTEIN IN URINE: ICD-10-CM

## 2021-07-28 DIAGNOSIS — Z3A.35 35 WEEKS GESTATION OF PREGNANCY: Primary | ICD-10-CM

## 2021-07-28 DIAGNOSIS — O71.03: ICD-10-CM

## 2021-07-28 DIAGNOSIS — Z98.891 PREVIOUS CESAREAN SECTION: ICD-10-CM

## 2021-07-28 DIAGNOSIS — O35.EXX0 FETAL HYDRONEPHROSIS DURING PREGNANCY, ANTEPARTUM, SINGLE OR UNSPECIFIED FETUS: Primary | ICD-10-CM

## 2021-07-28 DIAGNOSIS — Z34.80 SUPERVISION OF OTHER NORMAL PREGNANCY, ANTEPARTUM: ICD-10-CM

## 2021-07-28 PROCEDURE — 0502F SUBSEQUENT PRENATAL CARE: CPT | Performed by: OBSTETRICS & GYNECOLOGY

## 2021-07-28 NOTE — PROGRESS NOTES
Chief Complaint   Patient presents with   • Routine Prenatal Visit     pt reports having a cold this past week but feeling much better, c/o round ligament pains on her sides but denies any contractions      Patient presents for routine OB check feeling well without major complaints.  She is recovering from a mild cold.  The patient reports more symptoms of round ligament pain with a very active fetus.    Previous pregnancy was complicated by uterine scar separation without evulsion.  BPP will be ordered today for advanced maternal age and evaluate the uterine window and fetal growth.    No follow-ups on file.

## 2021-07-30 ENCOUNTER — TRANSCRIBE ORDERS (OUTPATIENT)
Dept: ULTRASOUND IMAGING | Facility: HOSPITAL | Age: 36
End: 2021-07-30

## 2021-07-30 ENCOUNTER — OFFICE VISIT (OUTPATIENT)
Dept: OBSTETRICS AND GYNECOLOGY | Facility: CLINIC | Age: 36
End: 2021-07-30

## 2021-07-30 ENCOUNTER — HOSPITAL ENCOUNTER (OUTPATIENT)
Dept: ULTRASOUND IMAGING | Facility: HOSPITAL | Age: 36
Discharge: HOME | End: 2021-07-30

## 2021-07-30 VITALS
TEMPERATURE: 98.2 F | DIASTOLIC BLOOD PRESSURE: 74 MMHG | SYSTOLIC BLOOD PRESSURE: 117 MMHG | WEIGHT: 182.6 LBS | HEART RATE: 76 BPM | HEIGHT: 66 IN | BODY MASS INDEX: 29.35 KG/M2

## 2021-07-30 DIAGNOSIS — O36.8930 MATERNAL CARE FOR OTHER SPECIFIED FETAL PROBLEMS, THIRD TRIMESTER, NOT APPLICABLE OR UNSPECIFIED: Primary | ICD-10-CM

## 2021-07-30 DIAGNOSIS — O09.523 MULTIGRAVIDA OF ADVANCED MATERNAL AGE IN THIRD TRIMESTER: Primary | ICD-10-CM

## 2021-07-30 DIAGNOSIS — O35.EXX0 FETAL HYDRONEPHROSIS DURING PREGNANCY, ANTEPARTUM, SINGLE OR UNSPECIFIED FETUS: ICD-10-CM

## 2021-07-30 PROCEDURE — 76819 FETAL BIOPHYS PROFIL W/O NST: CPT | Performed by: OBSTETRICS & GYNECOLOGY

## 2021-07-30 PROCEDURE — 99213 OFFICE O/P EST LOW 20 MIN: CPT | Performed by: OBSTETRICS & GYNECOLOGY

## 2021-07-30 PROCEDURE — 76805 OB US >/= 14 WKS SNGL FETUS: CPT | Performed by: OBSTETRICS & GYNECOLOGY

## 2021-07-30 NOTE — PROGRESS NOTES
MFM OUTPATIENT CONSULTATION        Requesting OB provider:  Dr. Castellanos.      Chief Complaint:  Left hydronephrosis      Patient is a 36 y.o. female  currently at 35w2d with an Estimated Date of Delivery: 21.    She presents for a MFM consultation secondary to left hydronephrosis.    She presents with her partner, Alexandru.    She endorses fetal movement.  She denies any vaginal bleeding, leakage of fluid, or cramping.  She denies headaches, visual changes, or RUQ pain.  She denies nausea or vomiting.           I have reviewed her complete medical, obstetrical, family, and surgical history along with her medications, and allergies.  Please see documentation in the EHR.      Prenatal Information:    External Prenatal Results     Pregnancy Outside Results - Transcribed From Office Records - See Scanned Records For Details     Test Value Date Time    ABO  A  21 1346    Rh  Positive  21 1346    Antibody Screen  Negative  21 1346    Varicella IgG  1,437 index 21 1346    Rubella  0.92 index 21 1346    Hgb  11.6 g/dL 21 0853       13.2 g/dL 21 1346    Hct  34.2 % 21 0853       39.1 % 21 1346    Glucose Fasting GTT       Glucose Tolerance Test 1 hour ^ 95  16     Glucose Tolerance Test 3 hour       Gonorrhea (discrete)  Negative  21 1357    Chlamydia (discrete)  Negative  21 1357    RPR  Non Reactive  21 1346    VDRL       Syphilis Antibody       HBsAg  Negative  21 1346    Herpes Simplex Virus PCR       Herpes Simplex VIrus Culture       HIV  Non Reactive  21 1346    Hep C RNA Quant PCR       Hep C Antibody  <0.1 s/co ratio 21 1346    AFP Declined      Group B Strep  Positive  17 1041    GBS Susceptibility to Clindamycin       GBS Susceptibility to Erythromycin       Fetal Fibronectin       Genetic Testing, Maternal Blood ^ declined  NIPT 21                  Past OB History:     OB History    Para Term  " AB Living   7 4 4 0 2 4   SAB TAB Ectopic Molar Multiple Live Births   2 0 0 0 0 4      # Outcome Date GA Lbr Yonatan/2nd Weight Sex Delivery Anes PTL Lv   7 Current            6 SAB 10/2020           5 Term 17 39w0d  3535 g (7 lb 12.7 oz) M CS-LTranv Spinal N WAYNE      Name: LILIAN OHARA      Apgar1: 8  Apgar5: 9   4 Term 16 39w2d  4255 g (9 lb 6.1 oz) M CS-LTranv Spinal N WAYNE      Birth Comments: No complications J      Name: Jaime \"Luke\"      Apgar1: 8  Apgar5: 8   3 Term 14 38w1d  3827 g (8 lb 7 oz) F Vag-Spont EPI N WAYNE      Birth Comments: Shoulder dystocia      Complications: Shoulder Dystocia      Name: Keke      Apgar1: 7  Apgar5: 9   2 Term 01/15/13 40w0d  3685 g (8 lb 2 oz) F Vag-Spont EPI N WAYNE      Birth Comments: no vbplb-lhdjofke-KDC      Name: Sofi      Apgar1: 8  Apgar5: 9   1 SAB 12 9w0d             Birth Comments: with D&C      Obstetric Comments   2021 7w5d Dating US:  Estimated Date of Delivery: 21 based on US hb    2021 27w0d normal interval growth: EFW 68% AC 71% hb, placenta posterior with resolved previa hb    2021 35w0d normal interval growth: EFW 72% AC 91%, BPP 8/8., vtx thin TRAY but no defect  hb        Past Medical History: Past Medical History:   Diagnosis Date   • Anemia    • 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 before onset of labor during third trimester of pregnancy 3/31/2021      Past Surgical History Past Surgical History:   Procedure Laterality Date   •  SECTION Bilateral 2016    Procedure:  SECTION PRIMARY;  Surgeon: Ann Mitchell MD;  Location: [facility] LABOR DELIVERY;  Service:    •  SECTION N/A 2017    Procedure:  SECTION REPEAT;  Surgeon: Stew Castellanos MD;  Location: [facility] LABOR DELIVERY;  Service:    • DILATATION AND CURETTAGE  2012   • WISDOM TOOTH EXTRACTION        Family History: Family History   Problem Relation Age of Onset   • Breast cancer Mother 54 "   • No Known Problems Father    • No Known Problems Brother    • No Known Problems Son    • No Known Problems Daughter    • Throat cancer Paternal Grandfather    • No Known Problems Paternal Grandmother    • Dementia Maternal Grandmother    • No Known Problems Maternal Grandfather    • No Known Problems Daughter    • No Known Problems Son        She denies a significant family history for congenital malformations, chromosomal abnormalities, inherited medical diseases, or mental delay.     Social History:  reports that she has never smoked. She has never used smokeless tobacco.   reports previous alcohol use.   reports no history of drug use.         MEDICATIONS:   Current Outpatient Medications:   •  aspirin 81 MG EC tablet, Take 81 mg by mouth Daily., Disp: , Rfl:   •  Prenatal Vit-Fe Fumarate-FA (prenatal vitamin 27-0.8) 27-0.8 MG tablet tablet, Take 1 tablet by mouth Daily., Disp: , Rfl:   •  omeprazole (priLOSEC) 40 MG capsule, Take 1 capsule by mouth Daily., Disp: 30 capsule, Rfl: 5        ALLERGIES:        Allergies   Allergen Reactions   • Sulfa Antibiotics Rash          ROS:   Please see pertinent ROS in the HPI.  All other systems are negative.    Objective       Vital Signs Range for the last 24 hours  Temperature: Temp:  [98.2 °F (36.8 °C)] 98.2 °F (36.8 °C)   Temp Source:     BP: BP: (117)/(74) 117/74   Pulse: Heart Rate:  [76] 76   Respirations:     SPO2:     O2 Amount (l/min):     O2 Devices     Weight: Weight:  [82.8 kg (182 lb 9.6 oz)] 82.8 kg (182 lb 9.6 oz)     Physical Examination:     Constitutional:  The patient is awake, alert, well developed, well-nourished and well groomed.     Head:  The skull is normocephalic, atraumatic and without masses. The patient's facial expression and facial contours are normal.    Eyes:  The eyelids are without lesions.  The sclera is white and the conjunctiva pink.    Skin:  Warm without rashes or lesions.      Neck:  The trachea is midline and the thyroid is not  enlarged    Respiratory:  The patient is breathing without effort.  She is not using accessory muscles for respiration.    Gastrointestinal:  Abdomen is soft and nontender, gravid.      Psychiatric:  The patient is oriented to person, place, and time. Speech is fluent and words are clear. Thought processes are coherent, insight is good. There are no obsessive, compulsive, phobic or delusional thoughts; there are no illusions or hallucinations. She is in no apparent distress with an appropriate mood and affect.   Neurologic:  The patient is with full mentation and speech.  No focal neurological deficits.      Musculoskeletal:  Normal gait and station.  Full range of motion is noted without evidence of pain bilaterally in both upper and lower extremities.  No deformities noted bilaterally in both upper and lower extremities.          Ultrasound results reviewed.  The limitation of prenatal ultrasound in the diagnosis of all congenital anomalies and genetic syndromes was discussed at this advanced gestational age.  Please see full viewpoint report under imaging tab.        Medical Discussion:      Hydronephrosis:Ureteropelvic junction obstruction(UPJ). The kidneys are of normal size, shape, location and echogenicity. The bladder contour and size is normal. There is no evidence of hydroureter, ascites, or urinoma.    We discussed hydronephrosis and I reviewed the basic anatomy of the renal system. I explained that this represents increased urine in the fetal kidneys. I explained that typical cut-offs for fetal pelvis diameter is < 4mm up to 20 weeks; < 5mm up to 30 weeks; and < 7mm up to term. The incidence is approximately less than 1% and is more common in male fetuses. It is generally has a sporadic occurrence.     I explained that the cause of the UPJ may be a sign of reflux, a sign of obstruction, abnormal renal formation/ anatomy, or a marker than can be associated with fetal aneuploidy or genetic syndromes such  as Down syndrome. However, in the absence of any other abnormality, the risk of fetal aneuploidy is not any higher than the risk associated with her age.     The patient was counseled that bilateral UPJ obstruction may occur in 10% of cases, 25% may have a contralateral renal abnormality, 10% may have extrarenal anomalies, 1/3 may have polyhydramnios, and oligohydramnios may be seen with severe bilateral obstruction.     I explained that there may be an increased risk of clinically significant renal disease after birth. Typically a  scan is performed approximately 3-7 days after birth.  She was given patient information.    We discussed today's finding of polyhydramnios of questionable etiology.  The patient was counseled regarding the various etiologies including diabetes and idiopathic.      The patient was instructed on how to perform fetal kick counts along with the signs and symptoms of labor and preeclampsia.  She was instructed to contact her nurse immediately if she had any questions or concerns.      The patient's questions and concerns have been addressed and she was counseled regarding subsequent recommendations and evaluation.       Impressions:    Intrauterine pregnancy at 35w2d gestation.    Measurements are c/w established TONNY.      Left hydronephrosis and hydroureter.   Limited views of fetal anatomy secondary to advanced gestational age.    Polyhydyramnios.  Normal diabetic screen.    Normal placentation.    AMA.  Patient declined NIPT and amnio.     BPP .     section x 2.  No evidence of previa, invasive disease, or dehiscence noted on today's limited exam.    Last pregnancy complicated by uterine dehiscence diagnosed intraoperatively.      Recommendations:    BPP in 1 week with MFM.    Delivery is recommended between 37.0 and 38.0 weeks secondary to history of dehiscence and grandmultiparity.  May consider administration of steroids prior to delivery.     evaluation   secondary to limited and suboptimal views of fetal anatomy secondary to advanced gestational age and hydronephrosis.        Thank you for allowing me to participate in the care of your patient. Please do not hesitate to contact me if you have any questions or concerns.       Marielle Agosto MD  Maternal Fetal Medicine Consultant  7/30/2021  16:32 EDT      This was a 45 minute outpatient consult in which over 50% of my time was spent in reviewing the patient's records, counseling, charting, and/or  coordinating care.

## 2021-08-03 ENCOUNTER — OFFICE VISIT (OUTPATIENT)
Dept: OBSTETRICS AND GYNECOLOGY | Facility: CLINIC | Age: 36
End: 2021-08-03

## 2021-08-03 ENCOUNTER — TRANSCRIBE ORDERS (OUTPATIENT)
Dept: ULTRASOUND IMAGING | Facility: HOSPITAL | Age: 36
End: 2021-08-03

## 2021-08-03 ENCOUNTER — HOSPITAL ENCOUNTER (OUTPATIENT)
Dept: ULTRASOUND IMAGING | Facility: HOSPITAL | Age: 36
Discharge: HOME | End: 2021-08-03
Admitting: OBSTETRICS & GYNECOLOGY

## 2021-08-03 VITALS
BODY MASS INDEX: 33.99 KG/M2 | WEIGHT: 180 LBS | HEIGHT: 61 IN | DIASTOLIC BLOOD PRESSURE: 68 MMHG | TEMPERATURE: 98 F | SYSTOLIC BLOOD PRESSURE: 111 MMHG

## 2021-08-03 DIAGNOSIS — O09.523 MULTIGRAVIDA OF ADVANCED MATERNAL AGE IN THIRD TRIMESTER: ICD-10-CM

## 2021-08-03 DIAGNOSIS — O09.523 MULTIGRAVIDA OF ADVANCED MATERNAL AGE IN THIRD TRIMESTER: Primary | ICD-10-CM

## 2021-08-03 DIAGNOSIS — O35.EXX0 FETAL HYDRONEPHROSIS DURING PREGNANCY, ANTEPARTUM, SINGLE OR UNSPECIFIED FETUS: Primary | ICD-10-CM

## 2021-08-03 DIAGNOSIS — O99.210 OBESITY IN PREGNANCY, ANTEPARTUM: ICD-10-CM

## 2021-08-03 DIAGNOSIS — O35.EXX0 FETAL HYDRONEPHROSIS DURING PREGNANCY, ANTEPARTUM, SINGLE OR UNSPECIFIED FETUS: ICD-10-CM

## 2021-08-03 PROCEDURE — 76819 FETAL BIOPHYS PROFIL W/O NST: CPT | Performed by: OBSTETRICS & GYNECOLOGY

## 2021-08-03 PROCEDURE — 99212 OFFICE O/P EST SF 10 MIN: CPT | Performed by: OBSTETRICS & GYNECOLOGY

## 2021-08-03 PROCEDURE — 76819 FETAL BIOPHYS PROFIL W/O NST: CPT

## 2021-08-03 NOTE — PROGRESS NOTES
Candace Mustafa is a 36 y.o. female G 7 P 4024.   Patient's last menstrual period was 11/25/2020 (exact date). EDC of 9/1/2021 now 35w6d weeks seen for follow-up consultation as requested by @ Dr. Stew wright to :     1) AMA  2) Obesity  3) Fetal hydronephrosis    Ultrasound images and findings reviewed with patient.  Amniotic fluid volume is normal. Fetal testing is reassuring. Stable left fetal hydronephrosis (14 mm) and hydroureter (10 mm).    Questions answered.    Recommendations:    1) Pediatric Urology consultation 2) Weekly BPP and limited fetal renal scan 3) Serial u/S every 4 weeks for fetal growth    Total time spent TODAY on this encounter, including pre-visit review of separately obtained history, face-to-face interaction performing medically appropriate physical exam, patient counseling/education, interpretation of diagnostic results, care coordination and documentation was 15 minutes.      Thank you for utilizing our ultrasound and consultative services.  If I may be of any further service, please do not hesitate to contact me.    Sincerely,    Chuy Garcias MD  Maternal-Fetal Medicine

## 2021-08-04 ENCOUNTER — ROUTINE PRENATAL (OUTPATIENT)
Dept: OBSTETRICS AND GYNECOLOGY | Age: 36
End: 2021-08-04

## 2021-08-04 VITALS — DIASTOLIC BLOOD PRESSURE: 70 MMHG | SYSTOLIC BLOOD PRESSURE: 114 MMHG | WEIGHT: 182.6 LBS | BODY MASS INDEX: 34.96 KG/M2

## 2021-08-04 DIAGNOSIS — Z98.891 PREVIOUS CESAREAN SECTION: ICD-10-CM

## 2021-08-04 DIAGNOSIS — Z3A.36 36 WEEKS GESTATION OF PREGNANCY: Primary | ICD-10-CM

## 2021-08-04 DIAGNOSIS — O71.03: ICD-10-CM

## 2021-08-04 DIAGNOSIS — Z28.39 RUBELLA NON-IMMUNE STATUS, ANTEPARTUM: ICD-10-CM

## 2021-08-04 DIAGNOSIS — O44.20 MARGINAL PLACENTA PREVIA: ICD-10-CM

## 2021-08-04 DIAGNOSIS — O99.210 OBESITY IN PREGNANCY, ANTEPARTUM: ICD-10-CM

## 2021-08-04 DIAGNOSIS — Z36.85 ANTENATAL SCREENING FOR STREPTOCOCCUS B: ICD-10-CM

## 2021-08-04 DIAGNOSIS — O09.899 RUBELLA NON-IMMUNE STATUS, ANTEPARTUM: ICD-10-CM

## 2021-08-04 DIAGNOSIS — O35.EXX0 FETAL HYDRONEPHROSIS DURING PREGNANCY, ANTEPARTUM, SINGLE OR UNSPECIFIED FETUS: ICD-10-CM

## 2021-08-04 DIAGNOSIS — O09.523 MULTIGRAVIDA OF ADVANCED MATERNAL AGE IN THIRD TRIMESTER: ICD-10-CM

## 2021-08-04 DIAGNOSIS — Z13.89 SCREENING FOR BLOOD OR PROTEIN IN URINE: ICD-10-CM

## 2021-08-04 DIAGNOSIS — Z34.80 SUPERVISION OF OTHER NORMAL PREGNANCY, ANTEPARTUM: ICD-10-CM

## 2021-08-04 PROCEDURE — 96372 THER/PROPH/DIAG INJ SC/IM: CPT | Performed by: OBSTETRICS & GYNECOLOGY

## 2021-08-04 PROCEDURE — 0502F SUBSEQUENT PRENATAL CARE: CPT | Performed by: OBSTETRICS & GYNECOLOGY

## 2021-08-04 RX ORDER — BETAMETHASONE SODIUM PHOSPHATE AND BETAMETHASONE ACETATE 3; 3 MG/ML; MG/ML
12 INJECTION, SUSPENSION INTRA-ARTICULAR; INTRALESIONAL; INTRAMUSCULAR; SOFT TISSUE EVERY 24 HOURS
Status: COMPLETED | OUTPATIENT
Start: 2021-08-04 | End: 2021-08-05

## 2021-08-04 RX ADMIN — BETAMETHASONE SODIUM PHOSPHATE AND BETAMETHASONE ACETATE 12 MG: 3; 3 INJECTION, SUSPENSION INTRA-ARTICULAR; INTRALESIONAL; INTRAMUSCULAR; SOFT TISSUE at 08:47

## 2021-08-04 NOTE — PROGRESS NOTES
Chief Complaint   Patient presents with   • Routine Prenatal Visit     36w OB Check , no issues; GBS today      The patient reports for her OB check feeling well without complaints.  The pregnancy is complicated by numerous issues including previous scar dehiscence without uterine rupture.  The lower uterine segment appears markedly thin but there is no evidence of scar separation.  The patient is newly diagnosed with fairly severe fetal left hydronephrosis and hydroureter.  Consult with pediatric urology is scheduled via virtual visit.  With previous scar separation and new findings of polyhydramnios also complicating the patient's obstetrical course maternal-fetal medicine recommend delivery between 37 and 38 weeks.  Early term steroids were recommended and will be initiated today.  Discussed timing with the patient and she wants to proceed 813    No follow-ups on file.

## 2021-08-05 ENCOUNTER — CLINICAL SUPPORT (OUTPATIENT)
Dept: OBSTETRICS AND GYNECOLOGY | Age: 36
End: 2021-08-05

## 2021-08-05 DIAGNOSIS — Z34.80 SUPERVISION OF OTHER NORMAL PREGNANCY, ANTEPARTUM: Primary | ICD-10-CM

## 2021-08-05 DIAGNOSIS — O71.03: ICD-10-CM

## 2021-08-05 PROCEDURE — 96372 THER/PROPH/DIAG INJ SC/IM: CPT | Performed by: OBSTETRICS & GYNECOLOGY

## 2021-08-05 RX ADMIN — BETAMETHASONE SODIUM PHOSPHATE AND BETAMETHASONE ACETATE 12 MG: 3; 3 INJECTION, SUSPENSION INTRA-ARTICULAR; INTRALESIONAL; INTRAMUSCULAR; SOFT TISSUE at 15:45

## 2021-08-06 LAB — GP B STREP DNA SPEC QL NAA+PROBE: POSITIVE

## 2021-08-11 ENCOUNTER — ROUTINE PRENATAL (OUTPATIENT)
Dept: OBSTETRICS AND GYNECOLOGY | Age: 36
End: 2021-08-11

## 2021-08-11 VITALS — DIASTOLIC BLOOD PRESSURE: 72 MMHG | SYSTOLIC BLOOD PRESSURE: 110 MMHG | WEIGHT: 183 LBS | BODY MASS INDEX: 35.04 KG/M2

## 2021-08-11 DIAGNOSIS — Z3A.37 37 WEEKS GESTATION OF PREGNANCY: Primary | ICD-10-CM

## 2021-08-11 DIAGNOSIS — O71.03: ICD-10-CM

## 2021-08-11 DIAGNOSIS — Z98.891 PREVIOUS CESAREAN SECTION: ICD-10-CM

## 2021-08-11 DIAGNOSIS — O09.523 MULTIGRAVIDA OF ADVANCED MATERNAL AGE IN THIRD TRIMESTER: ICD-10-CM

## 2021-08-11 DIAGNOSIS — O99.210 OBESITY IN PREGNANCY, ANTEPARTUM: ICD-10-CM

## 2021-08-11 DIAGNOSIS — Z34.80 SUPERVISION OF OTHER NORMAL PREGNANCY, ANTEPARTUM: ICD-10-CM

## 2021-08-11 DIAGNOSIS — Z13.89 SCREENING FOR BLOOD OR PROTEIN IN URINE: ICD-10-CM

## 2021-08-11 PROCEDURE — 0502F SUBSEQUENT PRENATAL CARE: CPT | Performed by: OBSTETRICS & GYNECOLOGY

## 2021-08-11 NOTE — PROGRESS NOTES
Chief Complaint   Patient presents with   • Routine Prenatal Visit     pt c/o not getting much sleep      Patient presents for routine OB check feeling well without major complaints. The patient reports good fetal movement and very few contractions.    The patient had a prior scar separation and is scheduled for  on Friday per M's recommendation. Steroids were given last week for the early term delivery

## 2021-08-13 ENCOUNTER — ANESTHESIA EVENT (OUTPATIENT)
Dept: LABOR AND DELIVERY | Facility: HOSPITAL | Age: 36
End: 2021-08-13

## 2021-08-13 ENCOUNTER — ANESTHESIA (OUTPATIENT)
Dept: LABOR AND DELIVERY | Facility: HOSPITAL | Age: 36
End: 2021-08-13

## 2021-08-13 ENCOUNTER — HOSPITAL ENCOUNTER (INPATIENT)
Facility: HOSPITAL | Age: 36
LOS: 2 days | Discharge: HOME | End: 2021-08-15
Attending: OBSTETRICS & GYNECOLOGY | Admitting: OBSTETRICS & GYNECOLOGY

## 2021-08-13 DIAGNOSIS — Z98.891 S/P CESAREAN SECTION: Primary | ICD-10-CM

## 2021-08-13 PROBLEM — Z34.90 PREGNANCY: Status: ACTIVE | Noted: 2021-08-13

## 2021-08-13 LAB
ABO GROUP BLD: NORMAL
ATMOSPHERIC PRESS: 753.4 MMHG
BASE EXCESS BLDCOV CALC-SCNC: -1.7 MMOL/L (ref -30–30)
BDY SITE: ABNORMAL
BLD GP AB SCN SERPL QL: NEGATIVE
COLLECT TME SMN: ABNORMAL
DEPRECATED RDW RBC AUTO: 38.9 FL (ref 37–54)
ERYTHROCYTE [DISTWIDTH] IN BLOOD BY AUTOMATED COUNT: 12.4 % (ref 12.3–15.4)
GAS FLOW AIRWAY: 3 LPM
HCO3 BLDCOV-SCNC: 22.3 MMOL/L
HCT VFR BLD AUTO: 30.9 % (ref 34–46.6)
HGB BLD-MCNC: 10.2 G/DL (ref 12–15.9)
MCH RBC QN AUTO: 28.5 PG (ref 26.6–33)
MCHC RBC AUTO-ENTMCNC: 33 G/DL (ref 31.5–35.7)
MCV RBC AUTO: 86.3 FL (ref 79–97)
MODALITY: ABNORMAL
NOTE: ABNORMAL
PCO2 BLDCOV: 34.7 MM HG (ref 35–51.3)
PH BLDCOV: 7.42 PH UNITS (ref 7.26–7.4)
PLATELET # BLD AUTO: 205 10*3/MM3 (ref 140–450)
PMV BLD AUTO: 11.2 FL (ref 6–12)
PO2 BLDCOV: 27.4 MM HG (ref 19–39)
RBC # BLD AUTO: 3.58 10*6/MM3 (ref 3.77–5.28)
RH BLD: POSITIVE
SAO2 % BLDCOA: 53.2 % (ref 92–99)
SAO2 % BLDCOV: ABNORMAL %
SARS-COV-2 RNA RESP QL NAA+PROBE: NOT DETECTED
T&S EXPIRATION DATE: NORMAL
WBC # BLD AUTO: 10.61 10*3/MM3 (ref 3.4–10.8)

## 2021-08-13 PROCEDURE — 85027 COMPLETE CBC AUTOMATED: CPT | Performed by: OBSTETRICS & GYNECOLOGY

## 2021-08-13 PROCEDURE — 86900 BLOOD TYPING SEROLOGIC ABO: CPT | Performed by: OBSTETRICS & GYNECOLOGY

## 2021-08-13 PROCEDURE — 25010000002 FENTANYL CITRATE (PF) 50 MCG/ML SOLUTION: Performed by: ANESTHESIOLOGY

## 2021-08-13 PROCEDURE — 86850 RBC ANTIBODY SCREEN: CPT | Performed by: OBSTETRICS & GYNECOLOGY

## 2021-08-13 PROCEDURE — 86901 BLOOD TYPING SEROLOGIC RH(D): CPT | Performed by: OBSTETRICS & GYNECOLOGY

## 2021-08-13 PROCEDURE — 25010000002 ONDANSETRON PER 1 MG: Performed by: NURSE ANESTHETIST, CERTIFIED REGISTERED

## 2021-08-13 PROCEDURE — U0003 INFECTIOUS AGENT DETECTION BY NUCLEIC ACID (DNA OR RNA); SEVERE ACUTE RESPIRATORY SYNDROME CORONAVIRUS 2 (SARS-COV-2) (CORONAVIRUS DISEASE [COVID-19]), AMPLIFIED PROBE TECHNIQUE, MAKING USE OF HIGH THROUGHPUT TECHNOLOGIES AS DESCRIBED BY CMS-2020-01-R: HCPCS | Performed by: OBSTETRICS & GYNECOLOGY

## 2021-08-13 PROCEDURE — 25010000002 MORPHINE PER 10 MG: Performed by: ANESTHESIOLOGY

## 2021-08-13 PROCEDURE — 25010000003 CEFAZOLIN IN DEXTROSE 2-4 GM/100ML-% SOLUTION: Performed by: OBSTETRICS & GYNECOLOGY

## 2021-08-13 PROCEDURE — 25010000002 PHENYLEPHRINE PER 1 ML: Performed by: NURSE ANESTHETIST, CERTIFIED REGISTERED

## 2021-08-13 PROCEDURE — S0260 H&P FOR SURGERY: HCPCS | Performed by: OBSTETRICS & GYNECOLOGY

## 2021-08-13 PROCEDURE — 59510 CESAREAN DELIVERY: CPT | Performed by: OBSTETRICS & GYNECOLOGY

## 2021-08-13 PROCEDURE — 25010000002 KETOROLAC TROMETHAMINE PER 15 MG: Performed by: OBSTETRICS & GYNECOLOGY

## 2021-08-13 PROCEDURE — 82803 BLOOD GASES ANY COMBINATION: CPT

## 2021-08-13 RX ORDER — FAMOTIDINE 10 MG/ML
20 INJECTION, SOLUTION INTRAVENOUS ONCE
Status: CANCELLED | OUTPATIENT
Start: 2021-08-13 | End: 2021-08-13

## 2021-08-13 RX ORDER — FAMOTIDINE 10 MG/ML
20 INJECTION, SOLUTION INTRAVENOUS 2 TIMES DAILY
Status: DISCONTINUED | OUTPATIENT
Start: 2021-08-13 | End: 2021-08-16 | Stop reason: HOSPADM

## 2021-08-13 RX ORDER — LIDOCAINE HYDROCHLORIDE 10 MG/ML
0.5 INJECTION, SOLUTION EPIDURAL; INFILTRATION; INTRACAUDAL; PERINEURAL ONCE AS NEEDED
Status: CANCELLED | OUTPATIENT
Start: 2021-08-13

## 2021-08-13 RX ORDER — CEFAZOLIN SODIUM 2 G/100ML
2 INJECTION, SOLUTION INTRAVENOUS ONCE
Status: COMPLETED | OUTPATIENT
Start: 2021-08-13 | End: 2021-08-13

## 2021-08-13 RX ORDER — OXYCODONE HYDROCHLORIDE AND ACETAMINOPHEN 5; 325 MG/1; MG/1
1 TABLET ORAL EVERY 4 HOURS PRN
Status: DISCONTINUED | OUTPATIENT
Start: 2021-08-13 | End: 2021-08-16 | Stop reason: HOSPADM

## 2021-08-13 RX ORDER — ACETAMINOPHEN 500 MG
1000 TABLET ORAL ONCE
Status: COMPLETED | OUTPATIENT
Start: 2021-08-13 | End: 2021-08-13

## 2021-08-13 RX ORDER — SODIUM CHLORIDE 0.9 % (FLUSH) 0.9 %
10 SYRINGE (ML) INJECTION EVERY 12 HOURS SCHEDULED
Status: DISCONTINUED | OUTPATIENT
Start: 2021-08-13 | End: 2021-08-13 | Stop reason: HOSPADM

## 2021-08-13 RX ORDER — LIDOCAINE HYDROCHLORIDE 10 MG/ML
5 INJECTION, SOLUTION EPIDURAL; INFILTRATION; INTRACAUDAL; PERINEURAL AS NEEDED
Status: DISCONTINUED | OUTPATIENT
Start: 2021-08-13 | End: 2021-08-13 | Stop reason: HOSPADM

## 2021-08-13 RX ORDER — FENTANYL CITRATE 50 UG/ML
INJECTION, SOLUTION INTRAMUSCULAR; INTRAVENOUS
Status: COMPLETED | OUTPATIENT
Start: 2021-08-13 | End: 2021-08-13

## 2021-08-13 RX ORDER — SODIUM CHLORIDE, SODIUM LACTATE, POTASSIUM CHLORIDE, CALCIUM CHLORIDE 600; 310; 30; 20 MG/100ML; MG/100ML; MG/100ML; MG/100ML
125 INJECTION, SOLUTION INTRAVENOUS CONTINUOUS
Status: DISCONTINUED | OUTPATIENT
Start: 2021-08-13 | End: 2021-08-16 | Stop reason: HOSPADM

## 2021-08-13 RX ORDER — IBUPROFEN 800 MG/1
800 TABLET ORAL EVERY 8 HOURS PRN
Status: DISCONTINUED | OUTPATIENT
Start: 2021-08-13 | End: 2021-08-16 | Stop reason: HOSPADM

## 2021-08-13 RX ORDER — SODIUM CHLORIDE 0.9 % (FLUSH) 0.9 %
10 SYRINGE (ML) INJECTION AS NEEDED
Status: DISCONTINUED | OUTPATIENT
Start: 2021-08-13 | End: 2021-08-13 | Stop reason: HOSPADM

## 2021-08-13 RX ORDER — SODIUM CHLORIDE 0.9 % (FLUSH) 0.9 %
3-10 SYRINGE (ML) INJECTION AS NEEDED
Status: CANCELLED | OUTPATIENT
Start: 2021-08-13

## 2021-08-13 RX ORDER — ACETAMINOPHEN 500 MG
1000 TABLET ORAL ONCE
Status: CANCELLED | OUTPATIENT
Start: 2021-08-13 | End: 2021-08-13

## 2021-08-13 RX ORDER — CARBOPROST TROMETHAMINE 250 UG/ML
250 INJECTION, SOLUTION INTRAMUSCULAR
Status: DISCONTINUED | OUTPATIENT
Start: 2021-08-13 | End: 2021-08-13 | Stop reason: HOSPADM

## 2021-08-13 RX ORDER — PHYTONADIONE 1 MG/.5ML
INJECTION, EMULSION INTRAMUSCULAR; INTRAVENOUS; SUBCUTANEOUS
Status: DISPENSED
Start: 2021-08-13 | End: 2021-08-13

## 2021-08-13 RX ORDER — ERYTHROMYCIN 5 MG/G
OINTMENT OPHTHALMIC
Status: DISPENSED
Start: 2021-08-13 | End: 2021-08-13

## 2021-08-13 RX ORDER — ONDANSETRON 2 MG/ML
4 INJECTION INTRAMUSCULAR; INTRAVENOUS EVERY 6 HOURS PRN
Status: DISCONTINUED | OUTPATIENT
Start: 2021-08-13 | End: 2021-08-16 | Stop reason: HOSPADM

## 2021-08-13 RX ORDER — OXYCODONE AND ACETAMINOPHEN 7.5; 325 MG/1; MG/1
1 TABLET ORAL EVERY 4 HOURS PRN
Status: DISCONTINUED | OUTPATIENT
Start: 2021-08-13 | End: 2021-08-16 | Stop reason: HOSPADM

## 2021-08-13 RX ORDER — SODIUM CHLORIDE, SODIUM LACTATE, POTASSIUM CHLORIDE, CALCIUM CHLORIDE 600; 310; 30; 20 MG/100ML; MG/100ML; MG/100ML; MG/100ML
9 INJECTION, SOLUTION INTRAVENOUS CONTINUOUS
Status: CANCELLED | OUTPATIENT
Start: 2021-08-13

## 2021-08-13 RX ORDER — SIMETHICONE 80 MG
80 TABLET,CHEWABLE ORAL 4 TIMES DAILY PRN
Status: DISCONTINUED | OUTPATIENT
Start: 2021-08-13 | End: 2021-08-16 | Stop reason: HOSPADM

## 2021-08-13 RX ORDER — MORPHINE SULFATE 1 MG/ML
INJECTION, SOLUTION EPIDURAL; INTRATHECAL; INTRAVENOUS
Status: COMPLETED | OUTPATIENT
Start: 2021-08-13 | End: 2021-08-13

## 2021-08-13 RX ORDER — BUPIVACAINE HYDROCHLORIDE 7.5 MG/ML
INJECTION, SOLUTION EPIDURAL; RETROBULBAR
Status: COMPLETED | OUTPATIENT
Start: 2021-08-13 | End: 2021-08-13

## 2021-08-13 RX ORDER — ONDANSETRON 4 MG/1
4 TABLET, FILM COATED ORAL EVERY 8 HOURS PRN
Status: DISCONTINUED | OUTPATIENT
Start: 2021-08-13 | End: 2021-08-16 | Stop reason: HOSPADM

## 2021-08-13 RX ORDER — OXYTOCIN-SODIUM CHLORIDE 0.9% IV SOLN 30 UNIT/500ML 30-0.9/5 UT/ML-%
250 SOLUTION INTRAVENOUS CONTINUOUS PRN
Status: ACTIVE | OUTPATIENT
Start: 2021-08-13 | End: 2021-08-13

## 2021-08-13 RX ORDER — HYDROCORTISONE 25 MG/G
CREAM TOPICAL 3 TIMES DAILY PRN
Status: DISCONTINUED | OUTPATIENT
Start: 2021-08-13 | End: 2021-08-16 | Stop reason: HOSPADM

## 2021-08-13 RX ORDER — METHYLERGONOVINE MALEATE 0.2 MG/ML
200 INJECTION INTRAVENOUS ONCE AS NEEDED
Status: DISCONTINUED | OUTPATIENT
Start: 2021-08-13 | End: 2021-08-13 | Stop reason: HOSPADM

## 2021-08-13 RX ORDER — ONDANSETRON 2 MG/ML
INJECTION INTRAMUSCULAR; INTRAVENOUS AS NEEDED
Status: DISCONTINUED | OUTPATIENT
Start: 2021-08-13 | End: 2021-08-13 | Stop reason: SURG

## 2021-08-13 RX ORDER — HYDROXYZINE 50 MG/1
50 TABLET, FILM COATED ORAL EVERY 6 HOURS PRN
Status: DISCONTINUED | OUTPATIENT
Start: 2021-08-13 | End: 2021-08-16 | Stop reason: HOSPADM

## 2021-08-13 RX ORDER — MISOPROSTOL 200 UG/1
800 TABLET ORAL ONCE AS NEEDED
Status: DISCONTINUED | OUTPATIENT
Start: 2021-08-13 | End: 2021-08-13 | Stop reason: HOSPADM

## 2021-08-13 RX ORDER — KETOROLAC TROMETHAMINE 15 MG/ML
15 INJECTION, SOLUTION INTRAMUSCULAR; INTRAVENOUS EVERY 6 HOURS PRN
Status: DISPENSED | OUTPATIENT
Start: 2021-08-13 | End: 2021-08-14

## 2021-08-13 RX ORDER — OXYTOCIN-SODIUM CHLORIDE 0.9% IV SOLN 30 UNIT/500ML 30-0.9/5 UT/ML-%
999 SOLUTION INTRAVENOUS ONCE
Status: COMPLETED | OUTPATIENT
Start: 2021-08-13 | End: 2021-08-13

## 2021-08-13 RX ORDER — OXYTOCIN-SODIUM CHLORIDE 0.9% IV SOLN 30 UNIT/500ML 30-0.9/5 UT/ML-%
125 SOLUTION INTRAVENOUS CONTINUOUS PRN
Status: COMPLETED | OUTPATIENT
Start: 2021-08-13 | End: 2021-08-13

## 2021-08-13 RX ORDER — SODIUM CHLORIDE 0.9 % (FLUSH) 0.9 %
3 SYRINGE (ML) INJECTION EVERY 12 HOURS SCHEDULED
Status: CANCELLED | OUTPATIENT
Start: 2021-08-13

## 2021-08-13 RX ADMIN — PHENYLEPHRINE HYDROCHLORIDE 100 MCG: 10 INJECTION INTRAVENOUS at 12:12

## 2021-08-13 RX ADMIN — ACETAMINOPHEN 1000 MG: 500 TABLET, FILM COATED ORAL at 11:25

## 2021-08-13 RX ADMIN — PHENYLEPHRINE HYDROCHLORIDE 100 MCG: 10 INJECTION INTRAVENOUS at 12:04

## 2021-08-13 RX ADMIN — SODIUM CHLORIDE, POTASSIUM CHLORIDE, SODIUM LACTATE AND CALCIUM CHLORIDE 125 ML/HR: 600; 310; 30; 20 INJECTION, SOLUTION INTRAVENOUS at 10:37

## 2021-08-13 RX ADMIN — OXYTOCIN 125 ML/HR: 10 INJECTION INTRAVENOUS at 13:44

## 2021-08-13 RX ADMIN — CEFAZOLIN SODIUM 2 G: 2 INJECTION, SOLUTION INTRAVENOUS at 11:46

## 2021-08-13 RX ADMIN — PHENYLEPHRINE HYDROCHLORIDE 100 MCG: 10 INJECTION INTRAVENOUS at 11:56

## 2021-08-13 RX ADMIN — FENTANYL CITRATE 15 MCG: 50 INJECTION INTRAMUSCULAR; INTRAVENOUS at 11:56

## 2021-08-13 RX ADMIN — BUPIVACAINE HYDROCHLORIDE 1.6 ML: 7.5 INJECTION, SOLUTION EPIDURAL; RETROBULBAR at 11:56

## 2021-08-13 RX ADMIN — FAMOTIDINE 20 MG: 10 INJECTION INTRAVENOUS at 11:25

## 2021-08-13 RX ADMIN — OXYTOCIN 999 ML/HR: 10 INJECTION INTRAVENOUS at 12:11

## 2021-08-13 RX ADMIN — ONDANSETRON HYDROCHLORIDE 4 MG: 2 SOLUTION INTRAMUSCULAR; INTRAVENOUS at 12:13

## 2021-08-13 RX ADMIN — PHENYLEPHRINE HYDROCHLORIDE 100 MCG: 10 INJECTION INTRAVENOUS at 12:16

## 2021-08-13 RX ADMIN — KETOROLAC TROMETHAMINE 15 MG: 15 INJECTION, SOLUTION INTRAMUSCULAR; INTRAVENOUS at 15:59

## 2021-08-13 RX ADMIN — MORPHINE SULFATE 200 MCG: 1 INJECTION, SOLUTION EPIDURAL; INTRATHECAL; INTRAVENOUS at 11:56

## 2021-08-13 RX ADMIN — SODIUM CHLORIDE, POTASSIUM CHLORIDE, SODIUM LACTATE AND CALCIUM CHLORIDE 1000 ML: 600; 310; 30; 20 INJECTION, SOLUTION INTRAVENOUS at 09:36

## 2021-08-13 NOTE — PLAN OF CARE
Problem: Adult Inpatient Plan of Care  Goal: Plan of Care Review  2021 by Glendy Ncihols RN  Outcome: Ongoing, Progressing  Flowsheets (Taken 2021 1403)  Progress: improving  Plan of Care Reviewed With:   patient   spouse  Outcome Summary: Delivery via  section of viable female infant with apgars of 9 & 9.  VSS.  Light rubra, with fundus at 1 below umbilicus.  Breastfeeding infant.  Denies pain.  Nydia score of 8-9.  Plan to transfer to mother baby unit once recovery period finished.  2021 140 by Glendy Nichols, ARCADIO  Outcome: Ongoing, Progressing  Flowsheets (Taken 2021 1403)  Progress: improving  Plan of Care Reviewed With:   patient   spouse  Outcome Summary: Delivery via  section of viable female infant with apgars of 9 & 9.  VSS.  Light rubra, with fundus at 1 below umbilicus.  Breastfeeding infant.  Denies pain.  Nydia score of 8-9.  Plan to transfer to mother baby unit once recovery period finished.  2021 by Glendy Nichols RN  Outcome: Ongoing, Progressing  Flowsheets (Taken 2021 1403)  Progress: improving  Plan of Care Reviewed With:   patient   spouse  Outcome Summary: Delivery via  section of viable female infant with apgars of 9 & 9.  VSS.  Light rubra, with fundus at 1 below umbilicus.  Breastfeeding infant.  Denies pain.  Nydia score of 8-9.  Plan to transfer to mother baby unit once recovery period finished.   Goal Outcome Evaluation:  Plan of Care Reviewed With: patient, spouse        Progress: improving  Outcome Summary: Delivery via  section of viable female infant with apgars of 9 & 9.  VSS.  Light rubra, with fundus at 1 below umbilicus.  Breastfeeding infant.  Denies pain.  Nydia score of 8-9.  Plan to transfer to mother baby unit once recovery period finished.

## 2021-08-13 NOTE — ANESTHESIA POSTPROCEDURE EVALUATION
Patient: Candace Mustafa    Procedure Summary     Date: 21 Room / Location:  [facility]   [facility]    Anesthesia Start: 1152 Anesthesia Stop: 1248    Procedure:  SECTION REPEAT (N/A Abdomen) Diagnosis:       Previous  section      (Previous  section [Z98.891])    Surgeons: Stew Castellanos MD Provider: Soraya Lira MD    Anesthesia Type: spinal ASA Status: 2          Anesthesia Type: spinal    Vitals  Vitals Value Taken Time   /77 21 1445   Temp 36.4 °C (97.6 °F) 21 1442   Pulse 68 21 1445   Resp 18 21 1442   SpO2 99 % 21 1442           Post Anesthesia Care and Evaluation      Comments: Pt. Discharged prior to being evaluated by anesthesia.  Chart is reviewed and no complications are noted.  THIS CASE IS NOT MEDICALLY DIRECTED

## 2021-08-13 NOTE — OP NOTE
[facility]   Obstetrics and Gynecology     2021    Patient:Candace Mustafa   MR#:[id number]     Section Procedure Note    Indications: Previous uterine window at CS    Pre-operative Diagnosis:   Intrauterine pregnancy at 37w2d  Advanced maternal age  Severe fetal hydronephrosis on the left  Previous uterine window at the time of  section    Post-operative Diagnosis: same    Procedure:  Low transverse  section     Surgeon: Stew Castellanos MD     Assistants:  MARCK BELLAMY    Anesthesia: Spinal anesthesia    Prenatal care problem list:  Patient Active Problem List   Diagnosis   • Supervision of other normal pregnancy, antepartum   • Rubella non-immune status, antepartum   • Previous  section   • AMA (advanced maternal age) multigravida 35+   • Genetic screening   • Marginal placenta previa   • History of 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 before onset of labor during third trimester of pregnancy   • Fetal hydronephrosis during pregnancy, antepartum   • Obesity in pregnancy, antepartum   • Pregnancy       Procedure Details   The patient was seen in the LDR preoperatively. The risks, benefits, complications, treatment options, and expected outcomes were discussed with the patient.  The patient concurred with the proposed plan, giving informed consent.  The site of surgery is discussed. The patient was taken to Operating Room # 01, identified as Candace Mustafa and the procedure verified as  Delivery. A Time Out was held and the above information confirmed.    After induction of anesthesia, the patient was draped and prepped in the usual sterile manner. A Pfannenstiel incision was made and carried down through the subcutaneous tissue to the fascia. Fascial incision was made and extended transversely. The fascia was  from the underlying rectus tissue superiorly and inferiorly. The peritoneum was identified and entered. Peritoneal incision was  extended longitudinally. The utero-vesical peritoneal reflection was incised transversely and the bladder flap was bluntly freed from the lower uterine segment. A low transverse uterine incision was made. Delivered from vertex presentation was a female  fetus 3245 g (7 lb 2.5 oz)  with Apgar scores of 9 at one minute and 9 at five minutes. After the umbilical cord was clamped and cut cord blood was obtained for evaluation. The placenta was removed intact and appeared normal. The uterine outline, tubes and ovaries appeared normal.  The uterine incision was closed with running locked sutures of 0 monocryl. Hemostasis was observed. Lavage was carried out until clear.     Peritoneum was closed with 2-0 Vicryl in a running fashion incorporating the muscle in the closure    The fascia was then reapproximated with running sutures of 0 Vicryl. The deep subcutaneous layer was reapproximated with 3-0 Vicryl in a running fashion. The skin was reapproximated with 3-0 monocryl in a subcuticular fashion.     Instrument, sponge, and needle counts were correct prior the abdominal closure and at the conclusion of the case.     MARCK BELLAMY was responsible for performing the following activities: Retraction, Suction, Irrigation, Suturing, Closing and Delivery of Fetus and their skilled assistance was necessary for the success of this case.      Findings:  Healthy viable female infant    Estimated Blood Loss:  600 cc    Calculated Blood Loss:       Total IV Fluids: 1500 ml           Specimens:  Placenta            Complications:  None; patient tolerated the procedure well.           Disposition: PACU - hemodynamically stable.           Condition: stable    Attending Attestation: I was present and scrubbed for the entire procedure.    Cord gases:    pH, Cord Venous   Date Value Ref Range Status   2021 7.416 (H) 7.260 - 7.400 pH Units Final       Stew Castellanos MD  2021   12:48 EDT

## 2021-08-13 NOTE — ANESTHESIA PREPROCEDURE EVALUATION
Anesthesia Evaluation     Patient summary reviewed and Nursing notes reviewed   no history of anesthetic complications:               Airway   Mallampati: II  Dental      Pulmonary - negative pulmonary ROS   Cardiovascular - negative cardio ROS  Exercise tolerance: good (4-7 METS)        Neuro/Psych- negative ROS  GI/Hepatic/Renal/Endo    (+) obesity,       Musculoskeletal (-) negative ROS    Abdominal    Substance History - negative use     OB/GYN    (+) Pregnant,         Other                        Anesthesia Plan    ASA 2     spinal   (  37w2d    LMP 2020 (Exact Date)     I have reviewed the patient's history with the patient and the chart, including all pertinent laboratory results and imaging. I have explained the risks of anesthesia including but not limited to dental damage, corneal abrasion, nerve injury, MI, stroke, and death.    )    Anesthetic plan, all risks, benefits, and alternatives have been provided, discussed and informed consent has been obtained with: patient.

## 2021-08-13 NOTE — LACTATION NOTE
This note was copied from a baby's chart.  P5 37w2d baby, 6 hrs old, nursed well after delivery and Mom just latched her deeply and baby is swallowing.mom easily expresses milk, also saying she became engorged before delivery and had to pump. Initiated HGP per RN, although pt does not want to pump if it is not needed. She has nursed her other babies as much as possible but also supplemented with formula. She has personal pump at home.

## 2021-08-13 NOTE — ANESTHESIA PROCEDURE NOTES
Spinal Block      Patient reassessed immediately prior to procedure    Patient location during procedure: OB  Start Time: 8/13/2021 11:54 AM  Stop Time: 8/13/2021 11:56 AM  Indication:at surgeon's request  Performed By  CRNA: Ros Nguyen CRNA  Preanesthetic Checklist  Completed: patient identified, IV checked, site marked, risks and benefits discussed, surgical consent, monitors and equipment checked, pre-op evaluation and timeout performed  Spinal Block Prep:  Patient Position:sitting  Sterile Tech:gloves, cap, mask and sterile barriers  Prep:Chloraprep  Patient Monitoring:blood pressure monitoring, continuous pulse oximetry and EKG  Spinal Block Procedure  Approach:midline  Guidance:landmark technique and palpation technique  Location:L4-L5  Needle Type:Doug  Needle Gauge:25  Placement of Spinal needle event:cerebrospinal fluid aspirated  Paresthesia: no  Fluid Appearance:clear  Medications: fentaNYL citrate (PF) (SUBLIMAZE) injection, 15 mcg  Morphine PF injection, 200 mcg  bupivacaine PF (MARCAINE) 0.75 % injection, 1.6 mL  Med Administered at 8/13/2021 11:56 AM   Post Assessment  Patient Tolerance:patient tolerated the procedure well with no apparent complications  Complications no

## 2021-08-13 NOTE — L&D DELIVERY NOTE
[facility]   Obstetrics and Gynecology     See CS OP note.    Stew Castellanos MD

## 2021-08-14 LAB
BASOPHILS # BLD AUTO: 0.06 10*3/MM3 (ref 0–0.2)
BASOPHILS NFR BLD AUTO: 0.4 % (ref 0–1.5)
DEPRECATED RDW RBC AUTO: 41.7 FL (ref 37–54)
EOSINOPHIL # BLD AUTO: 0.3 10*3/MM3 (ref 0–0.4)
EOSINOPHIL NFR BLD AUTO: 2.2 % (ref 0.3–6.2)
ERYTHROCYTE [DISTWIDTH] IN BLOOD BY AUTOMATED COUNT: 12.7 % (ref 12.3–15.4)
HCT VFR BLD AUTO: 30.9 % (ref 34–46.6)
HGB BLD-MCNC: 10.3 G/DL (ref 12–15.9)
IMM GRANULOCYTES # BLD AUTO: 0.13 10*3/MM3 (ref 0–0.05)
IMM GRANULOCYTES NFR BLD AUTO: 1 % (ref 0–0.5)
LYMPHOCYTES # BLD AUTO: 2.12 10*3/MM3 (ref 0.7–3.1)
LYMPHOCYTES NFR BLD AUTO: 15.8 % (ref 19.6–45.3)
MCH RBC QN AUTO: 29.7 PG (ref 26.6–33)
MCHC RBC AUTO-ENTMCNC: 33.3 G/DL (ref 31.5–35.7)
MCV RBC AUTO: 89 FL (ref 79–97)
MONOCYTES # BLD AUTO: 0.71 10*3/MM3 (ref 0.1–0.9)
MONOCYTES NFR BLD AUTO: 5.3 % (ref 5–12)
NEUTROPHILS NFR BLD AUTO: 10.1 10*3/MM3 (ref 1.7–7)
NEUTROPHILS NFR BLD AUTO: 75.3 % (ref 42.7–76)
NRBC BLD AUTO-RTO: 0 /100 WBC (ref 0–0.2)
PLATELET # BLD AUTO: 207 10*3/MM3 (ref 140–450)
PMV BLD AUTO: 11.5 FL (ref 6–12)
RBC # BLD AUTO: 3.47 10*6/MM3 (ref 3.77–5.28)
WBC # BLD AUTO: 13.42 10*3/MM3 (ref 3.4–10.8)

## 2021-08-14 PROCEDURE — 85025 COMPLETE CBC W/AUTO DIFF WBC: CPT | Performed by: OBSTETRICS & GYNECOLOGY

## 2021-08-14 RX ADMIN — IBUPROFEN 800 MG: 800 TABLET, FILM COATED ORAL at 03:39

## 2021-08-14 RX ADMIN — OXYCODONE AND ACETAMINOPHEN 1 TABLET: 5; 325 TABLET ORAL at 23:22

## 2021-08-14 RX ADMIN — SIMETHICONE 80 MG: 80 TABLET, CHEWABLE ORAL at 13:34

## 2021-08-14 RX ADMIN — OXYCODONE AND ACETAMINOPHEN 1 TABLET: 5; 325 TABLET ORAL at 18:11

## 2021-08-14 RX ADMIN — SIMETHICONE 80 MG: 80 TABLET, CHEWABLE ORAL at 21:58

## 2021-08-14 RX ADMIN — IBUPROFEN 800 MG: 800 TABLET, FILM COATED ORAL at 18:11

## 2021-08-14 NOTE — LACTATION NOTE
This note was copied from a baby's chart.  Mother reports that infant has been latching well and frequently, sustaining strong suckle at the breast. Infant has been voiding well, multiple wets/stools. Mother reports history of latch issues, but this infant is latching much better than her other children did already. She denies any pain, damage or tenderness in nipples. Discussed normal clusterfeeding, when to expect mature milk to come in, and engorgement management. Mother typically gets engorged when milk first comes in, advised that this may be less significant if infant continues to latch well. Mother denies any questions or concerns at this time. Mother has not been pumping, advised that pumping does not appear to be necessary at this point, recommended focusing energy and time on latching infant. Advised to call as needed.

## 2021-08-14 NOTE — PROGRESS NOTES
" POSTPARTUM ROUNDS    Chief complaint: post C/S concerns  SUBJECTIVE:  Patient appears comfortable, and pain seems to be controlled with by mouth medicines.  She is ambulating. .  Discussed advancing activity and diet.       ROS:  Pulm: neg for soa  GI: neg for heavy bleeding  Musculoskel: neg for leg pain      OBJECTIVE:  Vital Signs: /72 (BP Location: Right arm, Patient Position: Lying)   Pulse 82   Temp 98.2 °F (36.8 °C) (Oral)   Resp 16   Ht 165.1 cm (65\")   Wt 82.1 kg (181 lb)   LMP 2020 (Exact Date)   SpO2 96%   Breastfeeding Yes   BMI 30.12 kg/m²     Intake/Output Summary (Last 24 hours) at 2021 0854  Last data filed at 2021 0019  Gross per 24 hour   Intake 2594.62 ml   Output 1571 ml   Net 1023.62 ml       Constitutional:  The patient is well nourished.  Cardiovascular:  RRR  Resp:  nonlabored breathing  The incision is normal  Gastrointestinal:  fundus firm below umbilicus  Extremities:  no edema,no evidence dvt    LABS / IMAGING:  Lab Results (last 24 hours)     ** No results found for the last 24 hours. **          ASSESSMENT AND PLAN:    Principal Problem:    S/P  section  Overview:  Active Problems:    Previous  section  Overview:    AMA (advanced maternal age) multigravida 35+  Overview:    History of 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 before onset of labor during third trimester of pregnancy  Overview:    Fetal hydronephrosis during pregnancy, antepartum  Overview:    Pregnancy  Overview:  Resolved Problems:    * No resolved hospital problems. *      Patient is postop day 1 from LTCS  Patient is doing well  Advance diet as tolerated      Regular diet   Encourage ambulation     Gui Argueta MD    2021  08:54 EDT    "

## 2021-08-14 NOTE — PLAN OF CARE
Goal Outcome Evaluation:  Plan of Care Reviewed With: patient      Vs  STABLE.  Fundus firm  without excessive lochia. Abd  dsg removed. Incision approximated and  open to air.  Ambulates  without symptoms. Voids without difficulty.  + bonding with

## 2021-08-15 VITALS
OXYGEN SATURATION: 98 % | HEART RATE: 71 BPM | RESPIRATION RATE: 16 BRPM | SYSTOLIC BLOOD PRESSURE: 124 MMHG | DIASTOLIC BLOOD PRESSURE: 81 MMHG | WEIGHT: 181 LBS | HEIGHT: 65 IN | TEMPERATURE: 98 F | BODY MASS INDEX: 30.16 KG/M2

## 2021-08-15 PROCEDURE — 25010000002 MEASLES, MUMPS & RUBELLA VAC RECONSTITUTED SOLUTION: Performed by: OBSTETRICS & GYNECOLOGY

## 2021-08-15 PROCEDURE — 90471 IMMUNIZATION ADMIN: CPT | Performed by: OBSTETRICS & GYNECOLOGY

## 2021-08-15 PROCEDURE — 90707 MMR VACCINE SC: CPT | Performed by: OBSTETRICS & GYNECOLOGY

## 2021-08-15 RX ORDER — OXYCODONE HYDROCHLORIDE AND ACETAMINOPHEN 5; 325 MG/1; MG/1
2 TABLET ORAL EVERY 4 HOURS PRN
Qty: 20 TABLET | Refills: 0 | Status: SHIPPED | OUTPATIENT
Start: 2021-08-15 | End: 2021-08-20

## 2021-08-15 RX ORDER — IBUPROFEN 800 MG/1
800 TABLET ORAL EVERY 8 HOURS PRN
Qty: 30 TABLET | Refills: 0 | Status: SHIPPED | OUTPATIENT
Start: 2021-08-15 | End: 2022-11-14

## 2021-08-15 RX ADMIN — IBUPROFEN 800 MG: 800 TABLET, FILM COATED ORAL at 02:38

## 2021-08-15 RX ADMIN — IBUPROFEN 800 MG: 800 TABLET, FILM COATED ORAL at 12:31

## 2021-08-15 RX ADMIN — MEASLES, MUMPS, AND RUBELLA VIRUS VACCINE LIVE 0.5 ML: 1000; 12500; 1000 INJECTION, POWDER, LYOPHILIZED, FOR SUSPENSION SUBCUTANEOUS at 15:44

## 2021-08-15 NOTE — PROGRESS NOTES
[facility]   Obstetrics and Gynecology     8/15/2021    Name:Candace Mustafa    MR#:[id number]     Progress Note:  Post-Op 2 S/P    HD:2    Subjective   36 y.o. yo Female  s/p CS at 37w2d doing well. Pain well controlled. Tolerating regular diet and having flatus. Lochia normal.     Patient Active Problem List   Diagnosis   • Supervision of other normal pregnancy, antepartum   • Rubella non-immune status, antepartum   • Previous  section   • AMA (advanced maternal age) multigravida 35+   • Genetic screening   • Marginal placenta previa   • History of 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 before onset of labor during third trimester of pregnancy   • Fetal hydronephrosis during pregnancy, antepartum   • Obesity in pregnancy, antepartum   • Pregnancy   • S/P  section        Objective    Vitals  Temp:  Temp:  [98 °F (36.7 °C)-98.3 °F (36.8 °C)] 98 °F (36.7 °C)  Temp src: Oral  BP:  BP: (110-125)/(71-83) 124/81  Pulse:  Heart Rate:  [67-93] 71  RR:   Resp:  [16-17] 16  Weight: 82.1 kg (181 lb)  BMI:  Body mass index is 30.12 kg/m².    Physical Exam  Vitals and nursing note reviewed.   Constitutional:       Appearance: Normal appearance. She is normal weight.   Pulmonary:      Effort: Pulmonary effort is normal.   Neurological:      Mental Status: She is alert and oriented to person, place, and time.   Psychiatric:         Mood and Affect: Mood normal.         Behavior: Behavior normal.           I/O last 3 completed shifts:  In: -   Out: 550 [Urine:550]    LABS:  Results from last 7 days   Lab Units 21  0901 21  0941   WBC 10*3/mm3 13.42* 10.61   HEMOGLOBIN g/dL 10.3* 10.2*   HEMATOCRIT % 30.9* 30.9*   PLATELETS 10*3/mm3 207 205             Infant: female       Assessment    1.  POD 2    Plan:  Patient requests discharge      S/P  section    Previous  section    AMA (advanced maternal age) multigravida 35+    History of Dehiscence of  old uterine scar with extension before onset of labor during third trimester of pregnancy    Fetal hydronephrosis during pregnancy, antepartum    Pregnancy      Glendy Ritter MD  8/15/2021 10:59 EDT

## 2021-08-15 NOTE — DISCHARGE SUMMARY
[facility]   Obstetrics and Gynecology    Section Discharge Summary    Date of Admission: 2021  Date of Discharge:  8/15/2021      Patient: Candace Mustafa      MR#:[id number]    Surgeon/OB: Stew Castellanos MD    Discharge Diagnosis:  section at 37w2d, uncomplicated recovery    Procedures:  , Low Transverse     2021    12:09 PM      Anesthesia:  Spinal     Presenting Problem/History of Present Illness  Previous  section [Z98.891]  Pregnancy [Z34.90]  S/P  section [Z98.891]     Patient Active Problem List   Diagnosis   • Supervision of other normal pregnancy, antepartum   • Rubella non-immune status, antepartum   • Previous  section   • AMA (advanced maternal age) multigravida 35+   • Genetic screening   • Marginal placenta previa   • History of 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 before onset of labor during third trimester of pregnancy   • Fetal hydronephrosis during pregnancy, antepartum   • Obesity in pregnancy, antepartum   • Pregnancy   • S/P  section       Hospital Course  Patient is a 36 y.o. female  at 37w2d status post  section with uneventful postoperative recovery.  Patient was advanced to regular diet on postoperative day#1.  On discharge, ambulating, tolerating a regular diet without any difficulties and her incision is dry, clean and intact.     Infant:   female  fetus 3245 g (7 lb 2.5 oz)  with Apgar scores of 9 , 9  at five minutes.    Condition on Discharge:  Stable    Vital Signs  Temp:  [98 °F (36.7 °C)-98.3 °F (36.8 °C)] 98 °F (36.7 °C)  Heart Rate:  [67-93] 71  Resp:  [16-17] 16  BP: (110-125)/(71-83) 124/81    Results from last 7 days   Lab Units 21  0901 21  0941   WBC 10*3/mm3 13.42* 10.61   HEMOGLOBIN g/dL 10.3* 10.2*   HEMATOCRIT % 30.9* 30.9*   PLATELETS 10*3/mm3 207 205             Discharge Disposition  Home or Self Care    Discharge Medications     Your medication list       START taking these medications      Instructions Last Dose Given Next Dose Due   ibuprofen 800 MG tablet  Commonly known as: ADVIL,MOTRIN      Take 1 tablet by mouth Every 8 (Eight) Hours As Needed for Mild Pain .       oxyCODONE-acetaminophen 5-325 MG per tablet  Commonly known as: PERCOCET      Take 2 tablets by mouth Every 4 (Four) Hours As Needed for Moderate Pain  for up to 5 days.          CONTINUE taking these medications      Instructions Last Dose Given Next Dose Due   aspirin 81 MG EC tablet      Take 81 mg by mouth Daily.       omeprazole 40 MG capsule  Commonly known as: priLOSEC      Take 1 capsule by mouth Daily.       prenatal vitamin 27-0.8 27-0.8 MG tablet tablet      Take 1 tablet by mouth Daily.             Where to Get Your Medications      These medications were sent to [address] - [address] [place] [address] AT  [address] - [phone]  - [phone] [place] [address]    Phone: [phone]   · ibuprofen 800 MG tablet  · oxyCODONE-acetaminophen 5-325 MG per tablet           Discharge Diet: Regular    Follow-up Appointments  Future Appointments   Date Time Provider Department Center   8/18/2021  1:00 PM [place] [facility]   8/18/2021  2:15 PM Mandy Hayes PA [facility]   8/25/2021 10:45 AM Stew Castellanos MD [facility]     Additional Instructions for the Follow-ups that You Need to Schedule     Call MD for problems / concerns.   As directed      Instructions: Call for temp>101, vaginal bleeding greater than one pad/hour, severe pain or other concerns.    Order Comments: Instructions: Call for temp>101, vaginal bleeding greater than one pad/hour, severe pain or other concerns.          Discharge Follow-up with Specialty: Dr. Castellanos; 1 Week   As directed      Specialty: Dr. Castellanos    Follow Up: 1 Week               Prenatal labs/vax:   Immunization History   Administered Date(s) Administered   •  Tdap 04/06/2016, 03/31/2017       External Prenatal Results     Pregnancy Outside Results - Transcribed From Office Records - See Scanned Records For Details     Test Value Date Time    ABO  A  08/13/21 0941    Rh  Positive  08/13/21 0941    Antibody Screen  Negative  08/13/21 0941       Negative  01/18/21 1346    Varicella IgG  1,437 index 01/18/21 1346    Rubella  0.92 index 01/18/21 1346    Hgb  10.3 g/dL 08/14/21 0901       10.2 g/dL 08/13/21 0941       11.6 g/dL 06/02/21 0853       13.2 g/dL 01/18/21 1346    Hct  30.9 % 08/14/21 0901       30.9 % 08/13/21 0941       34.2 % 06/02/21 0853       39.1 % 01/18/21 1346    Glucose Fasting GTT       Glucose Tolerance Test 1 hour ^ 95  02/16/16     Glucose Tolerance Test 3 hour       Gonorrhea (discrete)  Negative  01/18/21 1357    Chlamydia (discrete)  Negative  01/18/21 1357    RPR  Non Reactive  01/18/21 1346    VDRL       Syphilis Antibody       HBsAg  Negative  01/18/21 1346    Herpes Simplex Virus PCR       Herpes Simplex VIrus Culture       HIV  Non Reactive  01/18/21 1346    Hep C RNA Quant PCR       Hep C Antibody  <0.1 s/co ratio 01/18/21 1346    AFP       Group B Strep  Positive  08/04/21 1108    GBS Susceptibility to Clindamycin       GBS Susceptibility to Erythromycin       Fetal Fibronectin       Genetic Testing, Maternal Blood ^ declined  01/13/17           Drug Screening     Test Value Date Time    Urine Drug Screen       Amphetamine Screen       Barbiturate Screen       Benzodiazepine Screen       Methadone Screen       Phencyclidine Screen       Opiates Screen       THC Screen       Cocaine Screen       Propoxyphene Screen       Buprenorphine Screen       Methamphetamine Screen       Oxycodone Screen       Tricyclic Antidepressants Screen             Legend    ^: Historical                          Glendy Ritter MD  8/15/2021  11:03 EDT

## 2021-08-15 NOTE — LACTATION NOTE
This note was copied from a baby's chart.  Mother reports that infant continued to feed frequently through the night, still voiding well, no pain or discomfort for mother. Mother denies any questions or concerns at this point.   Discussed weight/output expectations and provided OPLC info. Advised to call as needed for assist.

## 2021-08-15 NOTE — PLAN OF CARE
Goal Outcome Evaluation:  Plan of Care Reviewed With: patient        Progress: improving  Outcome Summary: Stable patient with well-controlled pain; breastfeeding infant

## 2021-08-23 PROBLEM — O09.523 SUPERVISION OF ELDERLY MULTIGRAVIDA IN THIRD TRIMESTER: Status: ACTIVE | Noted: 2021-08-23

## 2021-08-23 PROBLEM — Z3A.36 36 WEEKS GESTATION OF PREGNANCY: Status: ACTIVE | Noted: 2021-08-23

## 2021-08-25 ENCOUNTER — OFFICE VISIT (OUTPATIENT)
Dept: OBSTETRICS AND GYNECOLOGY | Age: 36
End: 2021-08-25

## 2021-08-25 VITALS
SYSTOLIC BLOOD PRESSURE: 120 MMHG | WEIGHT: 164 LBS | DIASTOLIC BLOOD PRESSURE: 80 MMHG | BODY MASS INDEX: 27.32 KG/M2 | HEIGHT: 65 IN

## 2021-08-25 DIAGNOSIS — Z09 POSTOP CHECK: Primary | ICD-10-CM

## 2021-08-25 PROCEDURE — 0503F POSTPARTUM CARE VISIT: CPT | Performed by: OBSTETRICS & GYNECOLOGY

## 2021-08-25 NOTE — PROGRESS NOTES
[facility]   Obstetrics and Gynecology   Postop  Visit    2021    Patient: Candace Mustafa          MR#:[id number]    History of Present Illness    36 y.o. female  status post  delivery   Patient presents visit without complaints.  The patient reports she recovered from the  better than any before  ________________________________________  Patient Active Problem List   Diagnosis   • Supervision of other normal pregnancy, antepartum   • Rubella non-immune status, antepartum   • Previous  section   • AMA (advanced maternal age) multigravida 35+   • Genetic screening   • Marginal placenta previa   • History of 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 before onset of labor during third trimester of pregnancy   • Fetal hydronephrosis during pregnancy, antepartum   • Obesity in pregnancy, antepartum   • Pregnancy   • S/P  section   • Supervision of elderly multigravida in third trimester   • 36 weeks gestation of pregnancy       Past Medical History:   Diagnosis Date   • Anemia    • 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 before onset of labor during third trimester of pregnancy 3/31/2021       Past Surgical History:   Procedure Laterality Date   •  SECTION Bilateral 2016    Procedure:  SECTION PRIMARY;  Surgeon: Ann Mitchell MD;  Location: [facility] LABOR DELIVERY;  Service:    •  SECTION N/A 2017    Procedure:  SECTION REPEAT;  Surgeon: Stew Castellanos MD;  Location: [facility] LABOR DELIVERY;  Service:    •  SECTION N/A 2021    Procedure:  SECTION REPEAT;  Surgeon: Stew Castellanos MD;  Location: [facility] LABOR DELIVERY;  Service: Obstetrics/Gynecology;  Laterality: N/A;   • DILATATION AND CURETTAGE     • WISDOM TOOTH EXTRACTION         Social History     Tobacco Use   Smoking Status Never Smoker   Smokeless Tobacco Never Used       has a current medication list which  "includes the following prescription(s): prenatal vitamin 27-0.8, aspirin, ibuprofen, and omeprazole.  ________________________________________  Review of Systems         Objective       /80   Ht 165.1 cm (65\")   Wt 74.4 kg (164 lb)   LMP 2020 (Exact Date)   Breastfeeding Yes   BMI 27.29 kg/m²    BP Readings from Last 3 Encounters:   21 120/80   08/15/21 124/81   21 110/72      Wt Readings from Last 3 Encounters:   21 74.4 kg (164 lb)   21 82.1 kg (181 lb)   21 83 kg (183 lb)      BMI: Estimated body mass index is 27.29 kg/m² as calculated from the following:    Height as of this encounter: 165.1 cm (65\").    Weight as of this encounter: 74.4 kg (164 lb).    EXAM     General:     Patient appears well in NAD  Abdomen: Soft, NT, +BS, no acute findings  Pelvic:  Scant lochia  Incision: Dry, clean, intact healing without signs of infection, small misaligned skin edge on the right   Ext:  No cyanosis, edema 1-2    Assessment:  Diagnoses and all orders for this visit:    1. Postop check (Primary)        Infant scheduled for follow-up for grade 4 hydronephrosis    Desiring natural family planning.  Discussed difficulty with predicting ovulation in the face of breast-feeding and recommend at least withdrawal    Plan:  Return for Annual exam     Stew Castellanos MD  2021 10:55 EDT  ________________________________________________________________  Delivery Details     Delivery: , Low Transverse     YOB: 2020    Time of Birth: 12:09 PM      Anesthesia: Spinal     Delivering clinician: Stew Castellanos      Infant    Findings: female  infant     Infant observations: Weight: 3245 g (7 lb 2.5 oz)     Observations/Comments:  Javad 1      Apgars: 9  @ 1 minute /    9  @ 5 minutes         Estimated Blood Loss:   cc.     "

## 2022-04-18 ENCOUNTER — OFFICE VISIT (OUTPATIENT)
Dept: OBSTETRICS AND GYNECOLOGY | Age: 37
End: 2022-04-18

## 2022-04-18 VITALS
BODY MASS INDEX: 27.69 KG/M2 | WEIGHT: 166.2 LBS | DIASTOLIC BLOOD PRESSURE: 64 MMHG | SYSTOLIC BLOOD PRESSURE: 106 MMHG | HEIGHT: 65 IN

## 2022-04-18 DIAGNOSIS — Z12.31 ENCOUNTER FOR SCREENING MAMMOGRAM FOR BREAST CANCER: Primary | ICD-10-CM

## 2022-04-18 DIAGNOSIS — Z01.419 ENCOUNTER FOR GYNECOLOGICAL EXAMINATION WITHOUT ABNORMAL FINDING: ICD-10-CM

## 2022-04-18 DIAGNOSIS — Z12.4 SCREENING FOR MALIGNANT NEOPLASM OF CERVIX: ICD-10-CM

## 2022-04-18 PROCEDURE — 99395 PREV VISIT EST AGE 18-39: CPT | Performed by: NURSE PRACTITIONER

## 2022-04-18 NOTE — PROGRESS NOTES
[facility]   Obstetrics and Gynecology       2022    Patient: Candace Mustafa          MR#:[id number]    History of Present Illness    Chief Complaint   Patient presents with   • Gynecologic Exam     annual. last pap 3/11/20 (neg)       37 y.o. female  who presents for annual exam. She denies vaginal or breast complaints. Her youngest child, Tessie, is now 8 months old. 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 Her mother was dx with breast cancer 5 years ago, underwent chemo and radiation. She would like to get a baseline mxr for screening this year. She is using NFP for contraception.     Studies reviewed:    Patient's last menstrual period was 2022 (exact date).  Obstetric History:  OB History        7    Para   5    Term   5       0    AB   2    Living   5       SAB   2    IAB   0    Ectopic   0    Molar        Multiple   0    Live Births   5          Obstetric Comments   2021 7w5d Dating US:  Estimated Date of Delivery: 21 based on US hb   2021 27w0d normal interval growth: EFW 68% AC 71% hb, placenta posterior with resolved previa hb   2021 35w0d normal interval growth: EFW 72% AC 91%, BPP 8/8., vtx th in TRAY but no defect  hb               Menstrual History:     Patient's last menstrual period was 2022 (exact date).       Sexual History:       ________________________________________  Patient Active Problem List   Diagnosis   • Supervision of other normal pregnancy, antepartum   • Rubella non-immune status, antepartum   • Previous  section   • AMA (advanced maternal age) multigravida 35+   • Genetic screening   • Marginal placenta previa   • History of 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 before onset of labor during third trimester of pregnancy   • Fetal hydronephrosis during pregnancy, antepartum   • Obesity in pregnancy, antepartum   • Pregnancy   • S/P  section   • Supervision of elderly multigravida in  third trimester   • 36 weeks gestation of pregnancy     Past Medical History:   Diagnosis Date   • Anemia    • 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 before onset of labor during third trimester of pregnancy 3/31/2021     Past Surgical History:   Procedure Laterality Date   •  SECTION Bilateral 2016    Procedure:  SECTION PRIMARY;  Surgeon: Ann Mitchell MD;  Location: [facility] LABOR DELIVERY;  Service:    •  SECTION N/A 2017    Procedure:  SECTION REPEAT;  Surgeon: Stew Castellanos MD;  Location: [facility] LABOR DELIVERY;  Service:    •  SECTION N/A 2021    Procedure:  SECTION REPEAT;  Surgeon: Stew Castellanos MD;  Location: [facility] LABOR DELIVERY;  Service: Obstetrics/Gynecology;  Laterality: N/A;   • DILATATION AND CURETTAGE     • WISDOM TOOTH EXTRACTION       Social History     Tobacco Use   Smoking Status Never Smoker   Smokeless Tobacco Never Used     Family History   Problem Relation Age of Onset   • Breast cancer Mother 54   • No Known Problems Father    • No Known Problems Brother    • No Known Problems Son    • No Known Problems Daughter    • Throat cancer Paternal Grandfather    • No Known Problems Paternal Grandmother    • Dementia Maternal Grandmother    • No Known Problems Maternal Grandfather    • No Known Problems Daughter    • No Known Problems Son      Prior to Admission medications    Medication Sig Start Date End Date Taking? Authorizing Provider   aspirin 81 MG EC tablet Take 81 mg by mouth Daily.    Provider, MD Jelani   ibuprofen (ADVIL,MOTRIN) 800 MG tablet Take 1 tablet by mouth Every 8 (Eight) Hours As Needed for Mild Pain . 8/15/21   Glendy Ritter MD   omeprazole (priLOSEC) 40 MG capsule Take 1 capsule by mouth Daily. 21   Stew Castellanos MD   Prenatal Vit-Fe Fumarate-FA (prenatal vitamin 27-0.8) 27-0.8 MG tablet tablet Take 1 tablet by mouth Daily.    ProviderJelani MD      ________________________________________    Current contraception: rhythm method  History of abnormal Pap smear: no  Family history of uterine or ovarian cancer: no  Family History of colon cancer/colon polyps: no  History of abnormal mammogram: no  History of abnormal lipids: no    The following portions of the patient's history were reviewed and updated as appropriate: allergies, current medications, past family history, past medical history, past social history, past surgical history, and problem list.    Review of Systems   Constitutional: Negative for activity change, appetite change, chills, fatigue and fever.   Respiratory: Negative for cough and shortness of breath.    Cardiovascular: Negative for chest pain.   Gastrointestinal: Negative for constipation, diarrhea, nausea and vomiting.   Genitourinary: Negative for dysuria, flank pain, genital sores, hematuria, menstrual problem and vaginal bleeding.            Objective   Physical Exam  Constitutional:       Appearance: Normal appearance.   HENT:      Head: Normocephalic and atraumatic.      Nose: Nose normal.      Mouth/Throat:      Mouth: Mucous membranes are moist.   Pulmonary:      Effort: Pulmonary effort is normal.   Chest:   Breasts:      Right: Normal. No mass or nipple discharge.      Left: Normal. No mass or nipple discharge.       Abdominal:      General: Abdomen is flat.      Palpations: Abdomen is soft.   Genitourinary:     General: Normal vulva.      Exam position: Lithotomy position.      Labia:         Right: No rash, tenderness or lesion.         Left: No rash, tenderness or lesion.       Vagina: Normal. No signs of injury.      Cervix: Normal.      Uterus: Normal.       Adnexa: Right adnexa normal and left adnexa normal.      Rectum: Normal.   Musculoskeletal:         General: Normal range of motion.      Cervical back: Normal range of motion.   Skin:     General: Skin is warm and dry.   Neurological:      Mental Status: She is alert.  "  Psychiatric:         Mood and Affect: Mood normal.         Behavior: Behavior normal.         /64   Ht 165.1 cm (65\")   Wt 75.4 kg (166 lb 3.2 oz)   LMP 03/21/2022 (Exact Date)   Breastfeeding No   BMI 27.66 kg/m²    BP Readings from Last 3 Encounters:   04/18/22 106/64   08/25/21 120/80   08/15/21 124/81      Wt Readings from Last 3 Encounters:   04/18/22 75.4 kg (166 lb 3.2 oz)   08/25/21 74.4 kg (164 lb)   08/13/21 82.1 kg (181 lb)        BMI: Estimated body mass index is 27.66 kg/m² as calculated from the following:    Height as of this encounter: 165.1 cm (65\").    Weight as of this encounter: 75.4 kg (166 lb 3.2 oz).    Counseling:  --Nutrition: Stressed importance of moderation and caloric balance, stressed fresh fruit and vegetables  --Exercise: Stressed the importance of regular exercise. 3-5 times weekly   - Discussed screening mammogram recommendations.   --Discussed benefits of screening colonoscopy- age 45 unless FH  --Discussed pap smear screening recommendations             Assessment:  Diagnoses and all orders for this visit:    1. Encounter for screening mammogram for breast cancer (Primary)  -     Mammo Screening Bilateral With CAD; Future    2. Screening for malignant neoplasm of cervix  -     IGP, Apt HPV,rfx 16 / 18,45    3. Encounter for gynecological examination without abnormal finding  -     IGP, Apt HPV,rfx 16 / 18,45      Plan:  Return for Annual physical.    HANG Bales  4/18/2022 10:38 EDT   "

## 2022-11-14 ENCOUNTER — OFFICE VISIT (OUTPATIENT)
Dept: OBSTETRICS AND GYNECOLOGY | Age: 37
End: 2022-11-14

## 2022-11-14 VITALS
BODY MASS INDEX: 27.42 KG/M2 | WEIGHT: 164.6 LBS | HEIGHT: 65 IN | SYSTOLIC BLOOD PRESSURE: 106 MMHG | DIASTOLIC BLOOD PRESSURE: 64 MMHG

## 2022-11-14 DIAGNOSIS — O46.8X1 SUBCHORIONIC HEMATOMA IN FIRST TRIMESTER, SINGLE OR UNSPECIFIED FETUS: ICD-10-CM

## 2022-11-14 DIAGNOSIS — O41.8X10 SUBCHORIONIC HEMATOMA IN FIRST TRIMESTER, SINGLE OR UNSPECIFIED FETUS: ICD-10-CM

## 2022-11-14 DIAGNOSIS — O36.80X0 ENCOUNTER TO DETERMINE FETAL VIABILITY OF PREGNANCY, SINGLE OR UNSPECIFIED FETUS: Primary | ICD-10-CM

## 2022-11-14 PROBLEM — O44.20 MARGINAL PLACENTA PREVIA: Status: RESOLVED | Noted: 2021-03-31 | Resolved: 2022-11-14

## 2022-11-14 PROBLEM — O09.523 SUPERVISION OF ELDERLY MULTIGRAVIDA IN THIRD TRIMESTER: Status: RESOLVED | Noted: 2021-08-23 | Resolved: 2022-11-14

## 2022-11-14 PROBLEM — Z98.891 S/P CESAREAN SECTION: Status: RESOLVED | Noted: 2021-08-13 | Resolved: 2022-11-14

## 2022-11-14 PROBLEM — Z34.80 SUPERVISION OF OTHER NORMAL PREGNANCY, ANTEPARTUM: Status: RESOLVED | Noted: 2021-01-18 | Resolved: 2022-11-14

## 2022-11-14 PROBLEM — O35.EXX0 FETAL HYDRONEPHROSIS DURING PREGNANCY, ANTEPARTUM: Status: RESOLVED | Noted: 2021-07-28 | Resolved: 2022-11-14

## 2022-11-14 PROBLEM — Z13.79 GENETIC SCREENING: Status: RESOLVED | Noted: 2021-03-08 | Resolved: 2022-11-14

## 2022-11-14 PROBLEM — Z34.90 PREGNANCY: Status: RESOLVED | Noted: 2021-08-13 | Resolved: 2022-11-14

## 2022-11-14 PROBLEM — Z3A.36 36 WEEKS GESTATION OF PREGNANCY: Status: RESOLVED | Noted: 2021-08-23 | Resolved: 2022-11-14

## 2022-11-14 PROCEDURE — 99214 OFFICE O/P EST MOD 30 MIN: CPT | Performed by: NURSE PRACTITIONER

## 2022-11-14 NOTE — PROGRESS NOTES
[facility]   Obstetrics and Gynecology       2022    Patient: Candace Mustafa          MR#:[id number]    History of Present Illness    Chief Complaint   Patient presents with   • Initial Prenatal Visit     Pregnancy confirmation, LMP 22       37 y.o. female  who presents for pregnancy confirmation. She is feeling well, some fatigue and occasional nausea.     Studies reviewed:  U/S shows viable first trimester IUP with FHR: 163, DANIEL noted right of GS measuring 2.17 x .45 cm.    Patient's last menstrual period was 2022 (exact date).  Obstetric History:  OB History        8    Para   5    Term   5       0    AB   2    Living   5       SAB   2    IAB   0    Ectopic   0    Molar        Multiple   0    Live Births   5          Obstetric Comments   2021 7w5d Dating US:  Estimated Date of Delivery: 21 based on US hb   2021 27w0d normal interval growth: EFW 68% AC 71% hb, placenta posterior with resolved previa hb   2021 35w0d normal interval growth: EFW 72% AC 91%, BPP ., vtx th in TRAY but no defect  hb               Menstrual History:     Patient's last menstrual period was 2022 (exact date).       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 before onset of labor during third trimester of pregnancy   • Obesity in pregnancy, antepartum   • Subchorionic hematoma in first trimester     Past Medical History:   Diagnosis Date   • Anemia    • 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 before onset of labor during third trimester of pregnancy 3/31/2021   • Marginal placenta previa 3/31/2021    3/31/2021 marginal posterior previa 2021 resolved previa      Past Surgical  History:   Procedure Laterality Date   •  SECTION Bilateral 2016    Procedure:  SECTION PRIMARY;  Surgeon: Ann Mitchell MD;  Location: [facility] LABOR DELIVERY;  Service:    •  SECTION N/A 2017    Procedure:  SECTION REPEAT;  Surgeon: Stew Castellanos MD;  Location: [facility] LABOR DELIVERY;  Service:    •  SECTION N/A 2021    Procedure:  SECTION REPEAT;  Surgeon: Stew Castellanos MD;  Location: [facility] LABOR DELIVERY;  Service: Obstetrics/Gynecology;  Laterality: N/A;   • DILATATION AND CURETTAGE     • WISDOM TOOTH EXTRACTION       Social History     Tobacco Use   Smoking Status Never   Smokeless Tobacco Never     Family History   Problem Relation Age of Onset   • Breast cancer Mother 54   • No Known Problems Father    • No Known Problems Brother    • No Known Problems Son    • No Known Problems Daughter    • Throat cancer Paternal Grandfather    • No Known Problems Paternal Grandmother    • Dementia Maternal Grandmother    • No Known Problems Maternal Grandfather    • No Known Problems Daughter    • No Known Problems Son      Prior to Admission medications    Medication Sig Start Date End Date Taking? Authorizing Provider   omeprazole (priLOSEC) 40 MG capsule Take 1 capsule by mouth Daily. 21  Yes Stew Castellanos MD   Prenatal Vit-Fe Fumarate-FA (prenatal vitamin 27-0.8) 27-0.8 MG tablet tablet Take 1 tablet by mouth Daily.   Yes Provider, MD Jelani   aspirin 81 MG EC tablet Take 81 mg by mouth Daily.  22 Yes ProviderJelani MD   ibuprofen (ADVIL,MOTRIN) 800 MG tablet Take 1 tablet by mouth Every 8 (Eight) Hours As Needed for Mild Pain . 8/15/21 11/14/22  Glendy Ritter MD     ________________________________________    The following portions of the patient's history were reviewed and updated as appropriate: allergies, current medications, past family history, past medical history, past social history, past surgical  "history, and problem list.    Review of Systems   Constitutional: Negative for activity change, appetite change, chills, fatigue and fever.   Respiratory: Negative for cough and shortness of breath.    Cardiovascular: Negative for chest pain.   Gastrointestinal: Negative for constipation, diarrhea, nausea and vomiting.   Genitourinary: Negative for dysuria, flank pain, genital sores, hematuria, menstrual problem and vaginal bleeding.           Objective   Physical Exam  Vitals reviewed.   Constitutional:       Appearance: Normal appearance. She is normal weight.   HENT:      Head: Normocephalic and atraumatic.      Nose: Nose normal.      Mouth/Throat:      Mouth: Mucous membranes are moist.   Eyes:      Pupils: Pupils are equal, round, and reactive to light.   Pulmonary:      Effort: Pulmonary effort is normal.   Abdominal:      General: Abdomen is flat.      Palpations: Abdomen is soft.   Musculoskeletal:         General: Normal range of motion.      Cervical back: Normal range of motion and neck supple.   Skin:     General: Skin is warm and dry.   Neurological:      Mental Status: She is alert and oriented to person, place, and time.         /64   Ht 165.1 cm (65\")   Wt 74.7 kg (164 lb 9.6 oz)   LMP 09/16/2022 (Exact Date)   BMI 27.39 kg/m²    BP Readings from Last 3 Encounters:   11/14/22 106/64   04/18/22 106/64   08/25/21 120/80      Wt Readings from Last 3 Encounters:   11/14/22 74.7 kg (164 lb 9.6 oz)   04/18/22 75.4 kg (166 lb 3.2 oz)   08/25/21 74.4 kg (164 lb)        BMI: Estimated body mass index is 27.39 kg/m² as calculated from the following:    Height as of this encounter: 165.1 cm (65\").    Weight as of this encounter: 74.7 kg (164 lb 9.6 oz).    Reviewed routine prenatal care with the office to include but not limited to expected weight gain during pregnancy, Tylenol products are fine, avoid aspirin and ibuprofen; not to change cat litter; food restrictions; avoidance of alcohol, tobacco, " drugs and saunas/hot tubs, discussed that the COVID vaccine is safe and recommended in pregnancy.            Assessment:  Diagnoses and all orders for this visit:    1. Encounter to determine fetal viability of pregnancy, single or unspecified fetus (Primary)  -     Urine Culture - Urine, Urine, Clean Catch  -     Chlamydia trachomatis, Neisseria gonorrhoeae, Trichomonas vaginalis, PCR - Urine, Urine, Clean Catch  -     OB Panel With HIV  -     TSH Rfx On Abnormal To Free T4  -     Hemoglobin A1c    2. Subchorionic hematoma in first trimester, single or unspecified fetus    SAB precautions discussed, call with heavy bleeding/pain    Plan:  Return in about 4 weeks (around 12/12/2022) for ob intake with Dr. Castellanos.    Felecia Llamas, APRN  11/14/2022 16:32 EST

## 2022-11-15 LAB
ABO GROUP BLD: NORMAL
BASOPHILS # BLD AUTO: 0 X10E3/UL (ref 0–0.2)
BASOPHILS NFR BLD AUTO: 0 %
BLD GP AB SCN SERPL QL: NEGATIVE
EOSINOPHIL # BLD AUTO: 0.1 X10E3/UL (ref 0–0.4)
EOSINOPHIL NFR BLD AUTO: 1 %
ERYTHROCYTE [DISTWIDTH] IN BLOOD BY AUTOMATED COUNT: 13.1 % (ref 11.7–15.4)
HBV SURFACE AG SERPL QL IA: NEGATIVE
HCT VFR BLD AUTO: 40.8 % (ref 34–46.6)
HCV AB S/CO SERPL IA: <0.1 S/CO RATIO (ref 0–0.9)
HGB BLD-MCNC: 13.5 G/DL (ref 11.1–15.9)
HIV 1+2 AB+HIV1 P24 AG SERPL QL IA: NON REACTIVE
IMM GRANULOCYTES # BLD AUTO: 0 X10E3/UL (ref 0–0.1)
IMM GRANULOCYTES NFR BLD AUTO: 0 %
LYMPHOCYTES # BLD AUTO: 2.5 X10E3/UL (ref 0.7–3.1)
LYMPHOCYTES NFR BLD AUTO: 25 %
MCH RBC QN AUTO: 29.9 PG (ref 26.6–33)
MCHC RBC AUTO-ENTMCNC: 33.1 G/DL (ref 31.5–35.7)
MCV RBC AUTO: 91 FL (ref 79–97)
MONOCYTES # BLD AUTO: 0.6 X10E3/UL (ref 0.1–0.9)
MONOCYTES NFR BLD AUTO: 6 %
NEUTROPHILS # BLD AUTO: 6.5 X10E3/UL (ref 1.4–7)
NEUTROPHILS NFR BLD AUTO: 68 %
PLATELET # BLD AUTO: 259 X10E3/UL (ref 150–450)
RBC # BLD AUTO: 4.51 X10E6/UL (ref 3.77–5.28)
RH BLD: POSITIVE
RPR SER QL: NON REACTIVE
RUBV IGG SERPL IA-ACNC: 1.31 INDEX
WBC # BLD AUTO: 9.8 X10E3/UL (ref 3.4–10.8)

## 2022-11-16 LAB
BACTERIA UR CULT: NO GROWTH
BACTERIA UR CULT: NORMAL
C TRACH RRNA SPEC QL NAA+PROBE: NEGATIVE
N GONORRHOEA RRNA SPEC QL NAA+PROBE: NEGATIVE
T VAGINALIS RRNA SPEC QL NAA+PROBE: NEGATIVE

## 2022-11-22 LAB
HBA1C MFR BLD: 5.1 % (ref 4.8–5.6)
T4 FREE SERPL-MCNC: 1.14 NG/DL (ref 0.82–1.77)
TSH SERPL DL<=0.005 MIU/L-ACNC: 0.41 UIU/ML (ref 0.45–4.5)
WRITTEN AUTHORIZATION: NORMAL

## 2022-12-14 ENCOUNTER — INITIAL PRENATAL (OUTPATIENT)
Dept: OBSTETRICS AND GYNECOLOGY | Age: 37
End: 2022-12-14

## 2022-12-14 VITALS — DIASTOLIC BLOOD PRESSURE: 72 MMHG | WEIGHT: 168 LBS | SYSTOLIC BLOOD PRESSURE: 116 MMHG | BODY MASS INDEX: 27.96 KG/M2

## 2022-12-14 DIAGNOSIS — O41.8X10 SUBCHORIONIC HEMATOMA IN FIRST TRIMESTER, SINGLE OR UNSPECIFIED FETUS: ICD-10-CM

## 2022-12-14 DIAGNOSIS — Z13.89 SCREENING FOR BLOOD OR PROTEIN IN URINE: ICD-10-CM

## 2022-12-14 DIAGNOSIS — O46.8X1 SUBCHORIONIC HEMATOMA IN FIRST TRIMESTER, SINGLE OR UNSPECIFIED FETUS: ICD-10-CM

## 2022-12-14 DIAGNOSIS — O09.90 SUPERVISION OF HIGH RISK PREGNANCY, ANTEPARTUM: Primary | ICD-10-CM

## 2022-12-14 DIAGNOSIS — O71.03: ICD-10-CM

## 2022-12-14 DIAGNOSIS — O09.521 MULTIGRAVIDA OF ADVANCED MATERNAL AGE IN FIRST TRIMESTER: ICD-10-CM

## 2022-12-14 DIAGNOSIS — Z98.891 PREVIOUS CESAREAN SECTION: ICD-10-CM

## 2022-12-14 PROBLEM — O09.899 RUBELLA NON-IMMUNE STATUS, ANTEPARTUM: Status: RESOLVED | Noted: 2021-01-20 | Resolved: 2022-12-14

## 2022-12-14 PROBLEM — O99.210 OBESITY IN PREGNANCY, ANTEPARTUM: Status: RESOLVED | Noted: 2021-08-03 | Resolved: 2022-12-14

## 2022-12-14 PROBLEM — Z28.39 RUBELLA NON-IMMUNE STATUS, ANTEPARTUM: Status: RESOLVED | Noted: 2021-01-20 | Resolved: 2022-12-14

## 2022-12-14 LAB
BILIRUB BLD-MCNC: NEGATIVE MG/DL
EXTERNAL NIPT: NORMAL
GLUCOSE UR STRIP-MCNC: NEGATIVE MG/DL
KETONES UR QL: NEGATIVE
LEUKOCYTE EST, POC: ABNORMAL
NITRITE UR-MCNC: NEGATIVE MG/ML
PH UR: 7.5 [PH] (ref 5–8)
PROT UR STRIP-MCNC: NEGATIVE MG/DL
RBC # UR STRIP: NEGATIVE /UL
SP GR UR: 1.02 (ref 1–1.03)
UROBILINOGEN UR QL: NORMAL

## 2022-12-14 PROCEDURE — 0502F SUBSEQUENT PRENATAL CARE: CPT | Performed by: OBSTETRICS & GYNECOLOGY

## 2022-12-14 NOTE — PROGRESS NOTES
Chief Complaint   Patient presents with   • Routine Prenatal Visit     OB intake, declines genetic testing today, no cc      Patient presents for OB intake feeling well without complaints.  The patient declines noninvasive prenatal testing.    Return in about 4 weeks (around 1/11/2023) for ob check.

## 2023-01-11 ENCOUNTER — ROUTINE PRENATAL (OUTPATIENT)
Dept: OBSTETRICS AND GYNECOLOGY | Age: 38
End: 2023-01-11
Payer: COMMERCIAL

## 2023-01-11 VITALS — DIASTOLIC BLOOD PRESSURE: 68 MMHG | SYSTOLIC BLOOD PRESSURE: 112 MMHG | BODY MASS INDEX: 27.96 KG/M2 | WEIGHT: 168 LBS

## 2023-01-11 DIAGNOSIS — O71.03: ICD-10-CM

## 2023-01-11 DIAGNOSIS — O09.90 SUPERVISION OF HIGH RISK PREGNANCY, ANTEPARTUM: Primary | ICD-10-CM

## 2023-01-11 DIAGNOSIS — O41.8X10 SUBCHORIONIC HEMATOMA IN FIRST TRIMESTER, SINGLE OR UNSPECIFIED FETUS: ICD-10-CM

## 2023-01-11 DIAGNOSIS — O46.8X1 SUBCHORIONIC HEMATOMA IN FIRST TRIMESTER, SINGLE OR UNSPECIFIED FETUS: ICD-10-CM

## 2023-01-11 DIAGNOSIS — O09.522 MULTIGRAVIDA OF ADVANCED MATERNAL AGE IN SECOND TRIMESTER: ICD-10-CM

## 2023-01-11 DIAGNOSIS — Z13.89 SCREENING FOR BLOOD OR PROTEIN IN URINE: ICD-10-CM

## 2023-01-11 DIAGNOSIS — Z98.891 PREVIOUS CESAREAN SECTION: ICD-10-CM

## 2023-01-11 LAB
BILIRUB BLD-MCNC: NEGATIVE MG/DL
CLARITY, POC: CLEAR
COLOR UR: YELLOW
GLUCOSE UR STRIP-MCNC: NEGATIVE MG/DL
KETONES UR QL: NEGATIVE
LEUKOCYTE EST, POC: ABNORMAL
NITRITE UR-MCNC: NEGATIVE MG/ML
PH UR: 6.5 [PH] (ref 5–8)
PROT UR STRIP-MCNC: NEGATIVE MG/DL
RBC # UR STRIP: NEGATIVE /UL
SP GR UR: 1.01 (ref 1–1.03)
UROBILINOGEN UR QL: NORMAL

## 2023-01-11 PROCEDURE — 0502F SUBSEQUENT PRENATAL CARE: CPT | Performed by: OBSTETRICS & GYNECOLOGY

## 2023-01-11 NOTE — PROGRESS NOTES
Chief Complaint   Patient presents with   • Routine Prenatal Visit     Ob ck      The patient presents for routine OB check reporting periodic ligament pain otherwise feeling well.  Past obstetrical history is complicated by her previous uterine scar separation and uterine window.  We discussed the tentative plan for delivery around 38 weeks.  The patient declined genetic testing.    Return in about 4 weeks (around 2/8/2023) for Anatomic survey and OB check.   AFP screening next visit

## 2023-02-01 ENCOUNTER — TELEPHONE (OUTPATIENT)
Dept: OBSTETRICS AND GYNECOLOGY | Age: 38
End: 2023-02-01
Payer: COMMERCIAL

## 2023-02-01 NOTE — TELEPHONE ENCOUNTER
I spoke with pt, she has decided  that she will check her fetal heart tones tomorrow at work since they have a monitor there. And call us back if she can't hear them.

## 2023-02-01 NOTE — TELEPHONE ENCOUNTER
Pt called, she has not felt the baby move and states usually by now she would have with her previous pregnancies, she is 18wks 6 days, her next appt is 2/15/23 with you, she wants to know if you can see her today to check for fetal heart tones. Please advise

## 2023-02-15 ENCOUNTER — ROUTINE PRENATAL (OUTPATIENT)
Dept: OBSTETRICS AND GYNECOLOGY | Age: 38
End: 2023-02-15
Payer: COMMERCIAL

## 2023-02-15 VITALS — SYSTOLIC BLOOD PRESSURE: 110 MMHG | BODY MASS INDEX: 29.29 KG/M2 | WEIGHT: 176 LBS | DIASTOLIC BLOOD PRESSURE: 58 MMHG

## 2023-02-15 DIAGNOSIS — O09.90 SUPERVISION OF HIGH RISK PREGNANCY, ANTEPARTUM: Primary | ICD-10-CM

## 2023-02-15 DIAGNOSIS — Z13.89 SCREENING FOR BLOOD OR PROTEIN IN URINE: ICD-10-CM

## 2023-02-15 DIAGNOSIS — O09.522 MULTIGRAVIDA OF ADVANCED MATERNAL AGE IN SECOND TRIMESTER: ICD-10-CM

## 2023-02-15 LAB
BILIRUB BLD-MCNC: NEGATIVE MG/DL
GLUCOSE UR STRIP-MCNC: NEGATIVE MG/DL
KETONES UR QL: NEGATIVE
LEUKOCYTE EST, POC: ABNORMAL
NITRITE UR-MCNC: NEGATIVE MG/ML
PH UR: 7 [PH] (ref 5–8)
PROT UR STRIP-MCNC: NEGATIVE MG/DL
RBC # UR STRIP: NEGATIVE /UL
SP GR UR: 1.01 (ref 1–1.03)
UROBILINOGEN UR QL: NORMAL

## 2023-02-15 PROCEDURE — 0502F SUBSEQUENT PRENATAL CARE: CPT | Performed by: NURSE PRACTITIONER

## 2023-02-15 NOTE — PROGRESS NOTES
Candace presents for routine OB visit at 20w6d  Anatomy scan normal but incomplete- repeat in 4 weeks  No complaints, doing well     4 weeks repeat anatomy and OB visit  Call for changes or concerns

## 2023-02-22 ENCOUNTER — OFFICE VISIT (OUTPATIENT)
Dept: FAMILY MEDICINE CLINIC | Facility: CLINIC | Age: 38
End: 2023-02-22
Payer: COMMERCIAL

## 2023-02-22 VITALS
WEIGHT: 179.2 LBS | OXYGEN SATURATION: 100 % | HEART RATE: 81 BPM | DIASTOLIC BLOOD PRESSURE: 64 MMHG | HEIGHT: 65 IN | SYSTOLIC BLOOD PRESSURE: 124 MMHG | TEMPERATURE: 98.4 F | BODY MASS INDEX: 29.85 KG/M2

## 2023-02-22 DIAGNOSIS — Z00.01 ENCOUNTER FOR ANNUAL GENERAL MEDICAL EXAMINATION WITH ABNORMAL FINDINGS IN ADULT: Primary | ICD-10-CM

## 2023-02-22 DIAGNOSIS — Z3A.20 20 WEEKS GESTATION OF PREGNANCY: ICD-10-CM

## 2023-02-22 DIAGNOSIS — R79.89 ABNORMAL TSH: ICD-10-CM

## 2023-02-22 LAB
EXTERNAL CYSTIC FIBROSIS: NEGATIVE
HEMOGLOBIN FRACTIONATION: NORMAL

## 2023-02-22 PROCEDURE — 99395 PREV VISIT EST AGE 18-39: CPT | Performed by: FAMILY MEDICINE

## 2023-02-22 NOTE — PROGRESS NOTES
Subjective   Candace Mustafa is a 37 y.o. female who presents for annual female wellness exam.  Chief Complaint   Patient presents with   • Annual Exam     Currently pregnant.  No complications.  Has been wearing support knee-high's for her varicose veins.  Reviewing her labs from OB, she did have an abnormal TSH a few months ago.    Menstrual History: currently pregnant  Pregnancy History: , 21 weeks, 2 , s/p CS times 3  Sexual History: with spouse   Contraception: pregnant  Hormone Replacement Therapy: na  Diet: standard  Exercise: some, busy at work  Up to date with vision and dental.     Mammogram: na  Pap Smear: per gyn  Bone Density: na  Colon Cancer Screening: na    Immunization History   Administered Date(s) Administered   • COVID-19 (PFIZER) PURPLE CAP 2021, 2021   • MMR 08/15/2021   • Tdap 2016, 2017       The following portions of the patient's history were reviewed and updated as appropriate: allergies, current medications, past family history, past medical history, past social history, past surgical history and problem list.    Past Medical History:   Diagnosis Date   • Anemia    • 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 before onset of labor during third trimester of pregnancy 3/31/2021   • Marginal placenta previa 3/31/2021    3/31/2021 marginal posterior previa 2021 resolved previa        Past Surgical History:   Procedure Laterality Date   •  SECTION Bilateral 2016    Procedure:  SECTION PRIMARY;  Surgeon: Ann Mitchell MD;  Location: [facility] LABOR DELIVERY;  Service:    •  SECTION N/A 2017    Procedure:  SECTION REPEAT;  Surgeon: Stew Castellanos MD;  Location: [facility] LABOR DELIVERY;  Service:    •  SECTION N/A 2021    Procedure:  SECTION REPEAT;  Surgeon: Stew Castellanos MD;  Location: [facility] LABOR DELIVERY;  Service: Obstetrics/Gynecology;  Laterality: N/A;   • DILATATION AND  "CURETTAGE  2012   • WISDOM TOOTH EXTRACTION         Family History   Problem Relation Age of Onset   • Breast cancer Mother 54   • No Known Problems Father    • No Known Problems Brother    • No Known Problems Son    • No Known Problems Daughter    • Throat cancer Paternal Grandfather    • No Known Problems Paternal Grandmother    • Dementia Maternal Grandmother    • No Known Problems Maternal Grandfather    • No Known Problems Daughter    • No Known Problems Son        Social History     Socioeconomic History   • Marital status:      Spouse name: Kodi   • Number of children: 4   • Years of education: 16   Tobacco Use   • Smoking status: Never   • Smokeless tobacco: Never   Vaping Use   • Vaping Use: Never used   Substance and Sexual Activity   • Alcohol use: Yes     Comment: Social   • Drug use: No   • Sexual activity: Yes     Partners: Male     Birth control/protection: None     Comment: [employer] FAMILY [employer]ANNING spouse = KODI         Current Outpatient Medications:   •  Prenatal Vit-Fe Fumarate-FA (prenatal vitamin 27-0.8) 27-0.8 MG tablet tablet, Take 1 tablet by mouth Daily., Disp: , Rfl:   •  omeprazole (priLOSEC) 40 MG capsule, Take 1 capsule by mouth Daily., Disp: 30 capsule, Rfl: 5    Review of Systems    Objective   Vitals:    02/22/23 0937   BP: 124/64   Pulse: 81   Temp: 98.4 °F (36.9 °C)   SpO2: 100%   Weight: 81.3 kg (179 lb 3.2 oz)   Height: 165.1 cm (65\")     Body mass index is 29.82 kg/m².  Physical Exam  Vitals and nursing note reviewed.   Constitutional:       General: She is not in acute distress.     Appearance: Normal appearance. She is well-developed.   HENT:      Head: Normocephalic and atraumatic.      Right Ear: Tympanic membrane, ear canal and external ear normal.      Left Ear: Tympanic membrane, ear canal and external ear normal.      Nose: Nose normal.      Mouth/Throat:      Mouth: Mucous membranes are moist.      Pharynx: Oropharynx is clear. No oropharyngeal exudate or " posterior oropharyngeal erythema.   Eyes:      Conjunctiva/sclera: Conjunctivae normal.      Pupils: Pupils are equal, round, and reactive to light.   Neck:      Thyroid: No thyromegaly.   Cardiovascular:      Rate and Rhythm: Normal rate and regular rhythm.      Heart sounds: No murmur heard.  Pulmonary:      Effort: Pulmonary effort is normal.      Breath sounds: Normal breath sounds. No wheezing.   Abdominal:      General: Abdomen is flat. Bowel sounds are normal. There is no distension.      Palpations: Abdomen is soft. There is no mass.      Tenderness: There is no abdominal tenderness.      Hernia: No hernia is present.   Musculoskeletal:         General: No swelling. Normal range of motion.      Cervical back: Normal range of motion and neck supple.      Right lower leg: No edema.      Left lower leg: No edema.   Lymphadenopathy:      Cervical: No cervical adenopathy.   Skin:     General: Skin is warm and dry.      Capillary Refill: Capillary refill takes less than 2 seconds.      Findings: No rash.   Neurological:      General: No focal deficit present.      Mental Status: She is alert and oriented to person, place, and time.      Cranial Nerves: No cranial nerve deficit.   Psychiatric:         Mood and Affect: Mood normal.         Behavior: Behavior normal.           Assessment & Plan   Diagnoses and all orders for this visit:    1. Encounter for annual general medical examination with abnormal findings in adult (Primary)    2. Abnormal TSH  -     TSH  -     T3, Free  -     T4, Free    3. 20 weeks gestation of pregnancy               Discussed the importance of maintaining a healthy weight and getting regular exercise.  Educated patient on the benefits of healthy diet.  Advise follow-up annually for wellness exams.    There are no Patient Instructions on file for this visit.

## 2023-02-23 LAB
T3FREE SERPL-MCNC: 3 PG/ML (ref 2–4.4)
T4 FREE SERPL-MCNC: 0.91 NG/DL (ref 0.93–1.7)
TSH SERPL DL<=0.005 MIU/L-ACNC: 0.68 UIU/ML (ref 0.27–4.2)

## 2023-03-15 ENCOUNTER — ROUTINE PRENATAL (OUTPATIENT)
Dept: OBSTETRICS AND GYNECOLOGY | Age: 38
End: 2023-03-15
Payer: COMMERCIAL

## 2023-03-15 VITALS — WEIGHT: 181 LBS | SYSTOLIC BLOOD PRESSURE: 112 MMHG | DIASTOLIC BLOOD PRESSURE: 62 MMHG | BODY MASS INDEX: 30.12 KG/M2

## 2023-03-15 DIAGNOSIS — O09.90 SUPERVISION OF HIGH RISK PREGNANCY, ANTEPARTUM: Primary | ICD-10-CM

## 2023-03-15 DIAGNOSIS — O71.03: ICD-10-CM

## 2023-03-15 DIAGNOSIS — Z13.89 SCREENING FOR BLOOD OR PROTEIN IN URINE: ICD-10-CM

## 2023-03-15 DIAGNOSIS — O09.522 MULTIGRAVIDA OF ADVANCED MATERNAL AGE IN SECOND TRIMESTER: ICD-10-CM

## 2023-03-15 DIAGNOSIS — N89.8 VAGINAL ITCHING: ICD-10-CM

## 2023-03-15 DIAGNOSIS — Z98.891 PREVIOUS CESAREAN SECTION: ICD-10-CM

## 2023-03-15 PROBLEM — O22.00 VARICOSE VEINS DURING PREGNANCY: Status: ACTIVE | Noted: 2023-03-15

## 2023-03-15 LAB
BILIRUB BLD-MCNC: NEGATIVE MG/DL
CLARITY, POC: CLEAR
GLUCOSE UR STRIP-MCNC: NEGATIVE MG/DL
KETONES UR QL: NEGATIVE
LEUKOCYTE EST, POC: ABNORMAL
NITRITE UR-MCNC: NEGATIVE MG/ML
PH UR: 7 [PH] (ref 5–8)
PROT UR STRIP-MCNC: NEGATIVE MG/DL
RBC # UR STRIP: NEGATIVE /UL
SP GR UR: 1.03 (ref 1–1.03)
UROBILINOGEN UR QL: ABNORMAL

## 2023-03-15 PROCEDURE — 0502F SUBSEQUENT PRENATAL CARE: CPT | Performed by: OBSTETRICS & GYNECOLOGY

## 2023-03-15 NOTE — PROGRESS NOTES
Chief Complaint   Patient presents with   • Routine Prenatal Visit     ob ck & repeat anatomy scan, pt c/o varicose vein on the back of her right leg that goes up towards vagina. Also reports vaginal itching but no discharge or odor. Says her spider veins are itchy too so believes it may be connected but unsure.      Patient presents for regular OB check reporting worsening varicosities with a recurrent vaginal varicosity.  Anatomic survey is repeated and completed today.  Interval growth is normal.    The patient is also reporting intermittent symptoms of vaginal itching and screening is performed.

## 2023-03-17 LAB
A VAGINAE DNA VAG QL NAA+PROBE: ABNORMAL SCORE
BVAB2 DNA VAG QL NAA+PROBE: ABNORMAL SCORE
C ALBICANS DNA VAG QL NAA+PROBE: POSITIVE
C GLABRATA DNA VAG QL NAA+PROBE: NEGATIVE
C TRACH DNA VAG QL NAA+PROBE: NEGATIVE
MEGA1 DNA VAG QL NAA+PROBE: ABNORMAL SCORE
N GONORRHOEA DNA VAG QL NAA+PROBE: NEGATIVE
T VAGINALIS DNA VAG QL NAA+PROBE: NEGATIVE

## 2023-03-30 ENCOUNTER — TELEPHONE (OUTPATIENT)
Dept: OBSTETRICS AND GYNECOLOGY | Age: 38
End: 2023-03-30

## 2023-03-30 NOTE — TELEPHONE ENCOUNTER
Caller: Candace Mustafa    Relationship to patient: Self    Best call back number:     Patient is needing: A PRESCRIPTION TO BE RESENT TO [facility] FOR BREAST PUMP.

## 2023-04-05 ENCOUNTER — ROUTINE PRENATAL (OUTPATIENT)
Dept: OBSTETRICS AND GYNECOLOGY | Age: 38
End: 2023-04-05
Payer: COMMERCIAL

## 2023-04-05 VITALS — DIASTOLIC BLOOD PRESSURE: 66 MMHG | SYSTOLIC BLOOD PRESSURE: 108 MMHG | BODY MASS INDEX: 30.95 KG/M2 | WEIGHT: 186 LBS

## 2023-04-05 DIAGNOSIS — Z98.891 PREVIOUS CESAREAN SECTION: ICD-10-CM

## 2023-04-05 DIAGNOSIS — Z13.1 SCREENING FOR DIABETES MELLITUS: ICD-10-CM

## 2023-04-05 DIAGNOSIS — O22.00 VARICOSE VEINS DURING PREGNANCY: ICD-10-CM

## 2023-04-05 DIAGNOSIS — O09.523 MULTIGRAVIDA OF ADVANCED MATERNAL AGE IN THIRD TRIMESTER: ICD-10-CM

## 2023-04-05 DIAGNOSIS — O09.90 SUPERVISION OF HIGH RISK PREGNANCY, ANTEPARTUM: Primary | ICD-10-CM

## 2023-04-05 DIAGNOSIS — O71.03: ICD-10-CM

## 2023-04-05 DIAGNOSIS — Z13.0 SCREENING FOR IRON DEFICIENCY ANEMIA: ICD-10-CM

## 2023-04-05 DIAGNOSIS — Z23 ENCOUNTER FOR ADMINISTRATION OF VACCINE: ICD-10-CM

## 2023-04-05 DIAGNOSIS — Z13.89 SCREENING FOR BLOOD OR PROTEIN IN URINE: ICD-10-CM

## 2023-04-05 PROBLEM — O41.8X10 SUBCHORIONIC HEMATOMA IN FIRST TRIMESTER: Status: RESOLVED | Noted: 2022-11-14 | Resolved: 2023-04-05

## 2023-04-05 PROBLEM — O46.8X1 SUBCHORIONIC HEMATOMA IN FIRST TRIMESTER: Status: RESOLVED | Noted: 2022-11-14 | Resolved: 2023-04-05

## 2023-04-05 LAB
BILIRUB BLD-MCNC: NEGATIVE MG/DL
GLUCOSE UR STRIP-MCNC: NEGATIVE MG/DL
KETONES UR QL: NEGATIVE
LEUKOCYTE EST, POC: NEGATIVE
NITRITE UR-MCNC: NEGATIVE MG/ML
PH UR: 6.5 [PH] (ref 5–8)
PROT UR STRIP-MCNC: NEGATIVE MG/DL
RBC # UR STRIP: NEGATIVE /UL
SP GR UR: 1.01 (ref 1–1.03)
UROBILINOGEN UR QL: NORMAL

## 2023-04-05 PROCEDURE — 90471 IMMUNIZATION ADMIN: CPT | Performed by: OBSTETRICS & GYNECOLOGY

## 2023-04-05 PROCEDURE — 0502F SUBSEQUENT PRENATAL CARE: CPT | Performed by: OBSTETRICS & GYNECOLOGY

## 2023-04-05 PROCEDURE — 90715 TDAP VACCINE 7 YRS/> IM: CPT | Performed by: OBSTETRICS & GYNECOLOGY

## 2023-04-05 NOTE — PROGRESS NOTES
Chief Complaint   Patient presents with   • Routine Prenatal Visit     Cc: OB check and 1 hr gtt, 27w6d - no ob complaints good FM      The patient presents for her regular OB check feeling well without complaints.

## 2023-04-06 LAB
ERYTHROCYTE [DISTWIDTH] IN BLOOD BY AUTOMATED COUNT: 12.2 % (ref 11.7–15.4)
GLUCOSE 1H P 50 G GLC PO SERPL-MCNC: 94 MG/DL (ref 70–139)
HCT VFR BLD AUTO: 32.8 % (ref 34–46.6)
HGB BLD-MCNC: 11.3 G/DL (ref 11.1–15.9)
MCH RBC QN AUTO: 31.9 PG (ref 26.6–33)
MCHC RBC AUTO-ENTMCNC: 34.5 G/DL (ref 31.5–35.7)
MCV RBC AUTO: 93 FL (ref 79–97)
PLATELET # BLD AUTO: 211 X10E3/UL (ref 150–450)
RBC # BLD AUTO: 3.54 X10E6/UL (ref 3.77–5.28)
WBC # BLD AUTO: 11.2 X10E3/UL (ref 3.4–10.8)

## 2023-04-17 ENCOUNTER — ROUTINE PRENATAL (OUTPATIENT)
Dept: OBSTETRICS AND GYNECOLOGY | Age: 38
End: 2023-04-17
Payer: COMMERCIAL

## 2023-04-17 VITALS — DIASTOLIC BLOOD PRESSURE: 58 MMHG | SYSTOLIC BLOOD PRESSURE: 102 MMHG | WEIGHT: 190.2 LBS | BODY MASS INDEX: 31.65 KG/M2

## 2023-04-17 DIAGNOSIS — Z13.89 SCREENING FOR BLOOD OR PROTEIN IN URINE: Primary | ICD-10-CM

## 2023-04-17 LAB
BILIRUB BLD-MCNC: NEGATIVE MG/DL
GLUCOSE UR STRIP-MCNC: NEGATIVE MG/DL
KETONES UR QL: NEGATIVE
LEUKOCYTE EST, POC: ABNORMAL
NITRITE UR-MCNC: NEGATIVE MG/ML
PH UR: 7.5 [PH] (ref 5–8)
PROT UR STRIP-MCNC: NEGATIVE MG/DL
RBC # UR STRIP: NEGATIVE /UL
SP GR UR: 1.02 (ref 1–1.03)
UROBILINOGEN UR QL: NORMAL

## 2023-04-17 PROCEDURE — 0502F SUBSEQUENT PRENATAL CARE: CPT | Performed by: NURSE PRACTITIONER

## 2023-04-17 PROCEDURE — 81002 URINALYSIS NONAUTO W/O SCOPE: CPT | Performed by: NURSE PRACTITIONER

## 2023-04-17 NOTE — PROGRESS NOTES
Here for routine PNV, 29w4d  No problems   No contractions  Reports GFM  PTL warnings reviewed  Follow up 2 weeks with growth ultrasound

## 2023-05-03 ENCOUNTER — ROUTINE PRENATAL (OUTPATIENT)
Dept: OBSTETRICS AND GYNECOLOGY | Age: 38
End: 2023-05-03
Payer: COMMERCIAL

## 2023-05-03 VITALS — DIASTOLIC BLOOD PRESSURE: 64 MMHG | WEIGHT: 191 LBS | BODY MASS INDEX: 31.78 KG/M2 | SYSTOLIC BLOOD PRESSURE: 112 MMHG

## 2023-05-03 DIAGNOSIS — Z98.891 PREVIOUS CESAREAN SECTION: ICD-10-CM

## 2023-05-03 DIAGNOSIS — Z13.89 SCREENING FOR BLOOD OR PROTEIN IN URINE: Primary | ICD-10-CM

## 2023-05-03 DIAGNOSIS — O71.03: ICD-10-CM

## 2023-05-03 DIAGNOSIS — O09.90 SUPERVISION OF HIGH RISK PREGNANCY, ANTEPARTUM: ICD-10-CM

## 2023-05-03 DIAGNOSIS — O09.523 MULTIGRAVIDA OF ADVANCED MATERNAL AGE IN THIRD TRIMESTER: ICD-10-CM

## 2023-05-03 DIAGNOSIS — N30.01 ACUTE CYSTITIS WITH HEMATURIA: ICD-10-CM

## 2023-05-03 DIAGNOSIS — O22.00 VARICOSE VEINS DURING PREGNANCY: ICD-10-CM

## 2023-05-03 LAB
BILIRUB BLD-MCNC: NEGATIVE MG/DL
GLUCOSE UR STRIP-MCNC: NEGATIVE MG/DL
KETONES UR QL: NEGATIVE
LEUKOCYTE EST, POC: ABNORMAL
NITRITE UR-MCNC: POSITIVE MG/ML
PH UR: 7.5 [PH] (ref 5–8)
PROT UR STRIP-MCNC: ABNORMAL MG/DL
RBC # UR STRIP: NEGATIVE /UL
SP GR UR: 1.01 (ref 1–1.03)
UROBILINOGEN UR QL: NORMAL

## 2023-05-03 RX ORDER — NITROFURANTOIN 25; 75 MG/1; MG/1
100 CAPSULE ORAL 2 TIMES DAILY
Qty: 10 CAPSULE | Refills: 0 | Status: SHIPPED | OUTPATIENT
Start: 2023-05-03 | End: 2023-05-08

## 2023-05-03 NOTE — PROGRESS NOTES
Chief Complaint   Patient presents with   • Routine Prenatal Visit     Cc: ob check, 31w6d, no complaints     Patient presents for regular OB check feeling well without complaints.  Ultrasound for interval growth is completed and shows normal findings.    Incidental urine screen is positive for infection and prescription sent to the pharmacy    No follow-ups on file.

## 2023-05-06 LAB
BACTERIA UR CULT: ABNORMAL
BACTERIA UR CULT: ABNORMAL
OTHER ANTIBIOTIC SUSC ISLT: ABNORMAL

## 2023-05-11 ENCOUNTER — TELEPHONE (OUTPATIENT)
Dept: OBSTETRICS AND GYNECOLOGY | Age: 38
End: 2023-05-11
Payer: COMMERCIAL

## 2023-05-11 NOTE — TELEPHONE ENCOUNTER
Caller: Candace Mustafa    Relationship to patient: Self    Best call back number: [phone]    Patient is needing: PATIENT IS NEEDING A LETTER THAT STATES SHE IS PREGNANT AND HER  DATE FOR HUSBANDS JOB. WOULD LIKE NOTE EMAILED TO HER.     [email or web address]

## 2023-05-17 ENCOUNTER — ROUTINE PRENATAL (OUTPATIENT)
Dept: OBSTETRICS AND GYNECOLOGY | Age: 38
End: 2023-05-17
Payer: COMMERCIAL

## 2023-05-17 VITALS — BODY MASS INDEX: 31.78 KG/M2 | WEIGHT: 191 LBS | SYSTOLIC BLOOD PRESSURE: 110 MMHG | DIASTOLIC BLOOD PRESSURE: 66 MMHG

## 2023-05-17 DIAGNOSIS — Z13.89 SCREENING FOR BLOOD OR PROTEIN IN URINE: ICD-10-CM

## 2023-05-17 DIAGNOSIS — O09.523 MULTIGRAVIDA OF ADVANCED MATERNAL AGE IN THIRD TRIMESTER: ICD-10-CM

## 2023-05-17 DIAGNOSIS — Z98.891 PREVIOUS CESAREAN SECTION: ICD-10-CM

## 2023-05-17 DIAGNOSIS — O22.00 VARICOSE VEINS DURING PREGNANCY: ICD-10-CM

## 2023-05-17 DIAGNOSIS — O71.03: ICD-10-CM

## 2023-05-17 DIAGNOSIS — O09.90 SUPERVISION OF HIGH RISK PREGNANCY, ANTEPARTUM: Primary | ICD-10-CM

## 2023-05-17 NOTE — PROGRESS NOTES
Chief Complaint   Patient presents with   • Routine Prenatal Visit     CC:ob check 33w6d, no complaints     Patient presents for regular OB check feeling well without complaints.   testing is reassuring with biophysical profile     Return in about 1 week (around 2023) for OB check and BPP.

## 2023-05-21 ENCOUNTER — TELEPHONE (OUTPATIENT)
Dept: OBSTETRICS AND GYNECOLOGY | Age: 38
End: 2023-05-21
Payer: COMMERCIAL

## 2023-05-21 RX ORDER — AZITHROMYCIN 250 MG/1
TABLET, FILM COATED ORAL
Qty: 6 TABLET | Refills: 0 | Status: SHIPPED | OUTPATIENT
Start: 2023-05-21 | End: 2023-05-26

## 2023-05-21 NOTE — TELEPHONE ENCOUNTER
Called in with URI- bronchitis   Now with green sputum and can't kick it  Will send in z-pack  To ER if any high fever or SOB, chest pain

## 2023-05-24 ENCOUNTER — ROUTINE PRENATAL (OUTPATIENT)
Dept: OBSTETRICS AND GYNECOLOGY | Age: 38
End: 2023-05-24
Payer: COMMERCIAL

## 2023-05-24 VITALS — BODY MASS INDEX: 31.62 KG/M2 | DIASTOLIC BLOOD PRESSURE: 68 MMHG | WEIGHT: 190 LBS | SYSTOLIC BLOOD PRESSURE: 120 MMHG

## 2023-05-24 DIAGNOSIS — O99.820 GBS (GROUP B STREPTOCOCCUS CARRIER), +RV CULTURE, CURRENTLY PREGNANT: ICD-10-CM

## 2023-05-24 DIAGNOSIS — O71.03: ICD-10-CM

## 2023-05-24 DIAGNOSIS — Z13.89 SCREENING FOR BLOOD OR PROTEIN IN URINE: ICD-10-CM

## 2023-05-24 DIAGNOSIS — O09.523 MULTIGRAVIDA OF ADVANCED MATERNAL AGE IN THIRD TRIMESTER: ICD-10-CM

## 2023-05-24 DIAGNOSIS — O22.00 VARICOSE VEINS DURING PREGNANCY: ICD-10-CM

## 2023-05-24 DIAGNOSIS — Z98.891 PREVIOUS CESAREAN SECTION: ICD-10-CM

## 2023-05-24 DIAGNOSIS — O09.90 SUPERVISION OF HIGH RISK PREGNANCY, ANTEPARTUM: Primary | ICD-10-CM

## 2023-05-24 PROBLEM — Z13.79 GENETIC SCREENING: Status: ACTIVE | Noted: 2023-05-24

## 2023-05-24 LAB
BILIRUB BLD-MCNC: NEGATIVE MG/DL
CLARITY, POC: CLEAR
COLOR UR: YELLOW
GLUCOSE UR STRIP-MCNC: NEGATIVE MG/DL
KETONES UR QL: NEGATIVE
LEUKOCYTE EST, POC: ABNORMAL
NITRITE UR-MCNC: NEGATIVE MG/ML
PH UR: 7.5 [PH] (ref 5–8)
PROT UR STRIP-MCNC: NEGATIVE MG/DL
RBC # UR STRIP: ABNORMAL /UL
SP GR UR: 1.01 (ref 1–1.03)
UROBILINOGEN UR QL: NORMAL

## 2023-05-24 NOTE — PROGRESS NOTES
Chief Complaint   Patient presents with   • Routine Prenatal Visit     CC: ob check, 34w6, no complaints, GBS today     The patient presents for regular OB check feeling well without complaints.  GBS is completed today.   is scheduled for 2023    Return in about 1 week (around 2023) for OB check and BPP.

## 2023-05-29 LAB — B-HEM STREP SPEC QL CULT: NEGATIVE

## 2023-05-31 ENCOUNTER — ROUTINE PRENATAL (OUTPATIENT)
Dept: OBSTETRICS AND GYNECOLOGY | Age: 38
End: 2023-05-31

## 2023-05-31 VITALS — SYSTOLIC BLOOD PRESSURE: 122 MMHG | BODY MASS INDEX: 31.78 KG/M2 | WEIGHT: 191 LBS | DIASTOLIC BLOOD PRESSURE: 70 MMHG

## 2023-05-31 DIAGNOSIS — O22.00 VARICOSE VEINS DURING PREGNANCY: ICD-10-CM

## 2023-05-31 DIAGNOSIS — O09.523 MULTIGRAVIDA OF ADVANCED MATERNAL AGE IN THIRD TRIMESTER: ICD-10-CM

## 2023-05-31 DIAGNOSIS — Z13.89 SCREENING FOR BLOOD OR PROTEIN IN URINE: ICD-10-CM

## 2023-05-31 DIAGNOSIS — O09.90 SUPERVISION OF HIGH RISK PREGNANCY, ANTEPARTUM: Primary | ICD-10-CM

## 2023-05-31 DIAGNOSIS — Z98.891 PREVIOUS CESAREAN SECTION: ICD-10-CM

## 2023-05-31 DIAGNOSIS — O71.03: ICD-10-CM

## 2023-05-31 RX ORDER — BETAMETHASONE SODIUM PHOSPHATE AND BETAMETHASONE ACETATE 3; 3 MG/ML; MG/ML
12 INJECTION, SUSPENSION INTRA-ARTICULAR; INTRALESIONAL; INTRAMUSCULAR; SOFT TISSUE EVERY 24 HOURS
Status: COMPLETED | OUTPATIENT
Start: 2023-05-31 | End: 2023-06-01

## 2023-05-31 RX ADMIN — BETAMETHASONE SODIUM PHOSPHATE AND BETAMETHASONE ACETATE 12 MG: 3; 3 INJECTION, SUSPENSION INTRA-ARTICULAR; INTRALESIONAL; INTRAMUSCULAR; SOFT TISSUE at 09:36

## 2023-05-31 NOTE — PROGRESS NOTES
Chief Complaint   Patient presents with   • Routine Prenatal Visit     CC: ob check. 35w6d no complaints, does not want to be checked     Presents for regular OB check feeling well without complaints.  Reports few contractions and an active fetus.   testing is reassuring with biophysical profile  and interval growth shows normal findings with acceleration of the abdominal circumference.  Patient with a prior scar separation and scheduled for surgery on     With early term delivery, plan BMZ today and repeat tomorrow.

## 2023-06-01 ENCOUNTER — CLINICAL SUPPORT (OUTPATIENT)
Dept: OBSTETRICS AND GYNECOLOGY | Age: 38
End: 2023-06-01

## 2023-06-01 DIAGNOSIS — O09.523 MULTIGRAVIDA OF ADVANCED MATERNAL AGE IN THIRD TRIMESTER: ICD-10-CM

## 2023-06-01 DIAGNOSIS — O71.03: ICD-10-CM

## 2023-06-01 RX ADMIN — BETAMETHASONE SODIUM PHOSPHATE AND BETAMETHASONE ACETATE 12 MG: 3; 3 INJECTION, SUSPENSION INTRA-ARTICULAR; INTRALESIONAL; INTRAMUSCULAR; SOFT TISSUE at 09:36

## 2023-06-04 ENCOUNTER — ANESTHESIA EVENT (OUTPATIENT)
Dept: LABOR AND DELIVERY | Facility: HOSPITAL | Age: 38
End: 2023-06-04
Payer: COMMERCIAL

## 2023-06-04 ENCOUNTER — HOSPITAL ENCOUNTER (INPATIENT)
Facility: HOSPITAL | Age: 38
LOS: 4 days | Discharge: HOME | End: 2023-06-08
Attending: OBSTETRICS & GYNECOLOGY | Admitting: OBSTETRICS & GYNECOLOGY
Payer: COMMERCIAL

## 2023-06-04 ENCOUNTER — TELEPHONE (OUTPATIENT)
Dept: OBSTETRICS AND GYNECOLOGY | Age: 38
End: 2023-06-04

## 2023-06-04 ENCOUNTER — ANESTHESIA (OUTPATIENT)
Dept: LABOR AND DELIVERY | Facility: HOSPITAL | Age: 38
End: 2023-06-04
Payer: COMMERCIAL

## 2023-06-04 DIAGNOSIS — Z98.891 S/P CESAREAN SECTION: Primary | ICD-10-CM

## 2023-06-04 PROBLEM — O46.93 VAGINAL BLEEDING IN PREGNANCY, THIRD TRIMESTER: Status: ACTIVE | Noted: 2023-06-04

## 2023-06-04 PROBLEM — Z34.90 PREGNANCY: Status: ACTIVE | Noted: 2023-06-04

## 2023-06-04 LAB
ABO GROUP BLD: NORMAL
ATMOSPHERIC PRESS: 746.8 MMHG
BASE EXCESS BLDCOV CALC-SCNC: 0.5 MMOL/L (ref -30–30)
BDY SITE: ABNORMAL
BLD GP AB SCN SERPL QL: NEGATIVE
COLLECT TME SMN: ABNORMAL
DEPRECATED RDW RBC AUTO: 39.6 FL (ref 37–54)
ERYTHROCYTE [DISTWIDTH] IN BLOOD BY AUTOMATED COUNT: 12.6 % (ref 12.3–15.4)
GAS FLOW AIRWAY: 2 LPM
HCO3 BLDCOV-SCNC: 25.2 MMOL/L
HCT VFR BLD AUTO: 30 % (ref 34–46.6)
HGB BLD-MCNC: 10 G/DL (ref 12–15.9)
MCH RBC QN AUTO: 29.2 PG (ref 26.6–33)
MCHC RBC AUTO-ENTMCNC: 33.3 G/DL (ref 31.5–35.7)
MCV RBC AUTO: 87.5 FL (ref 79–97)
MODALITY: ABNORMAL
NOTE: ABNORMAL
PCO2 BLDCOV: 40.1 MM HG (ref 35–51.3)
PH BLDCOV: 7.41 PH UNITS (ref 7.26–7.4)
PLATELET # BLD AUTO: 217 10*3/MM3 (ref 140–450)
PMV BLD AUTO: 10.2 FL (ref 6–12)
PO2 BLDCOV: 32.7 MM HG (ref 19–39)
RBC # BLD AUTO: 3.43 10*6/MM3 (ref 3.77–5.28)
RH BLD: POSITIVE
SAO2 % BLDCOA: 63.4 % (ref 92–99)
SAO2 % BLDCOV: ABNORMAL %
T&S EXPIRATION DATE: NORMAL
WBC NRBC COR # BLD: 9.17 10*3/MM3 (ref 3.4–10.8)

## 2023-06-04 PROCEDURE — 82803 BLOOD GASES ANY COMBINATION: CPT

## 2023-06-04 PROCEDURE — 25010000002 MORPHINE PER 10 MG: Performed by: NURSE ANESTHETIST, CERTIFIED REGISTERED

## 2023-06-04 PROCEDURE — 86850 RBC ANTIBODY SCREEN: CPT | Performed by: OBSTETRICS & GYNECOLOGY

## 2023-06-04 PROCEDURE — 25010000002 ONDANSETRON PER 1 MG: Performed by: NURSE ANESTHETIST, CERTIFIED REGISTERED

## 2023-06-04 PROCEDURE — 59510 CESAREAN DELIVERY: CPT | Performed by: OBSTETRICS & GYNECOLOGY

## 2023-06-04 PROCEDURE — 25010000002 ONDANSETRON PER 1 MG: Performed by: ANESTHESIOLOGY

## 2023-06-04 PROCEDURE — 85027 COMPLETE CBC AUTOMATED: CPT | Performed by: OBSTETRICS & GYNECOLOGY

## 2023-06-04 PROCEDURE — 25010000002 FENTANYL CITRATE (PF) 50 MCG/ML SOLUTION: Performed by: NURSE ANESTHETIST, CERTIFIED REGISTERED

## 2023-06-04 PROCEDURE — 99202 OFFICE O/P NEW SF 15 MIN: CPT | Performed by: OBSTETRICS & GYNECOLOGY

## 2023-06-04 PROCEDURE — 25010000002 CEFAZOLIN IN DEXTROSE 2-4 GM/100ML-% SOLUTION: Performed by: OBSTETRICS & GYNECOLOGY

## 2023-06-04 PROCEDURE — 86901 BLOOD TYPING SEROLOGIC RH(D): CPT | Performed by: OBSTETRICS & GYNECOLOGY

## 2023-06-04 PROCEDURE — 25010000002 KETOROLAC TROMETHAMINE PER 15 MG: Performed by: OBSTETRICS & GYNECOLOGY

## 2023-06-04 PROCEDURE — 25010000002 KETOROLAC TROMETHAMINE PER 15 MG: Performed by: NURSE ANESTHETIST, CERTIFIED REGISTERED

## 2023-06-04 PROCEDURE — 86900 BLOOD TYPING SEROLOGIC ABO: CPT | Performed by: OBSTETRICS & GYNECOLOGY

## 2023-06-04 RX ORDER — CARBOPROST TROMETHAMINE 250 UG/ML
250 INJECTION, SOLUTION INTRAMUSCULAR
Status: DISCONTINUED | OUTPATIENT
Start: 2023-06-04 | End: 2023-06-04

## 2023-06-04 RX ORDER — PHENYLEPHRINE HCL IN 0.9% NACL 1 MG/10 ML
SYRINGE (ML) INTRAVENOUS AS NEEDED
Status: DISCONTINUED | OUTPATIENT
Start: 2023-06-04 | End: 2023-06-04 | Stop reason: SURG

## 2023-06-04 RX ORDER — ERYTHROMYCIN 5 MG/G
OINTMENT OPHTHALMIC
Status: ACTIVE
Start: 2023-06-04 | End: 2023-06-04

## 2023-06-04 RX ORDER — SODIUM CHLORIDE 0.9 % (FLUSH) 0.9 %
10 SYRINGE (ML) INJECTION EVERY 12 HOURS SCHEDULED
Status: DISCONTINUED | OUTPATIENT
Start: 2023-06-04 | End: 2023-06-04

## 2023-06-04 RX ORDER — KETOROLAC TROMETHAMINE 30 MG/ML
INJECTION, SOLUTION INTRAMUSCULAR; INTRAVENOUS AS NEEDED
Status: DISCONTINUED | OUTPATIENT
Start: 2023-06-04 | End: 2023-06-04 | Stop reason: SURG

## 2023-06-04 RX ORDER — CEFAZOLIN SODIUM 2 G/100ML
2 INJECTION, SOLUTION INTRAVENOUS ONCE
Status: COMPLETED | OUTPATIENT
Start: 2023-06-04 | End: 2023-06-04

## 2023-06-04 RX ORDER — MORPHINE SULFATE 4 MG/ML
INJECTION, SOLUTION INTRAMUSCULAR; INTRAVENOUS
Status: COMPLETED | OUTPATIENT
Start: 2023-06-04 | End: 2023-06-04

## 2023-06-04 RX ORDER — SODIUM CHLORIDE, SODIUM LACTATE, POTASSIUM CHLORIDE, CALCIUM CHLORIDE 600; 310; 30; 20 MG/100ML; MG/100ML; MG/100ML; MG/100ML
125 INJECTION, SOLUTION INTRAVENOUS CONTINUOUS
Status: DISCONTINUED | OUTPATIENT
Start: 2023-06-04 | End: 2023-06-04

## 2023-06-04 RX ORDER — ACETAMINOPHEN 325 MG/1
650 TABLET ORAL EVERY 6 HOURS
Status: DISCONTINUED | OUTPATIENT
Start: 2023-06-05 | End: 2023-06-08 | Stop reason: HOSPADM

## 2023-06-04 RX ORDER — OXYTOCIN/0.9 % SODIUM CHLORIDE 30/500 ML
125 PLASTIC BAG, INJECTION (ML) INTRAVENOUS CONTINUOUS PRN
Status: COMPLETED | OUTPATIENT
Start: 2023-06-04 | End: 2023-06-04

## 2023-06-04 RX ORDER — KETOROLAC TROMETHAMINE 15 MG/ML
15 INJECTION, SOLUTION INTRAMUSCULAR; INTRAVENOUS EVERY 6 HOURS
Status: DISPENSED | OUTPATIENT
Start: 2023-06-04 | End: 2023-06-05

## 2023-06-04 RX ORDER — OXYCODONE HYDROCHLORIDE 10 MG/1
10 TABLET ORAL EVERY 4 HOURS PRN
Status: DISCONTINUED | OUTPATIENT
Start: 2023-06-04 | End: 2023-06-08 | Stop reason: HOSPADM

## 2023-06-04 RX ORDER — FAMOTIDINE 10 MG/ML
20 INJECTION, SOLUTION INTRAVENOUS ONCE AS NEEDED
Status: COMPLETED | OUTPATIENT
Start: 2023-06-04 | End: 2023-06-04

## 2023-06-04 RX ORDER — MORPHINE SULFATE 2 MG/ML
2 INJECTION, SOLUTION INTRAMUSCULAR; INTRAVENOUS
Status: ACTIVE | OUTPATIENT
Start: 2023-06-04 | End: 2023-06-04

## 2023-06-04 RX ORDER — PHYTONADIONE 1 MG/.5ML
INJECTION, EMULSION INTRAMUSCULAR; INTRAVENOUS; SUBCUTANEOUS
Status: ACTIVE
Start: 2023-06-04 | End: 2023-06-04

## 2023-06-04 RX ORDER — HYDROMORPHONE HYDROCHLORIDE 1 MG/ML
0.5 INJECTION, SOLUTION INTRAMUSCULAR; INTRAVENOUS; SUBCUTANEOUS
Status: DISCONTINUED | OUTPATIENT
Start: 2023-06-04 | End: 2023-06-04 | Stop reason: HOSPADM

## 2023-06-04 RX ORDER — KETOROLAC TROMETHAMINE 30 MG/ML
30 INJECTION, SOLUTION INTRAMUSCULAR; INTRAVENOUS ONCE
Status: DISCONTINUED | OUTPATIENT
Start: 2023-06-04 | End: 2023-06-04

## 2023-06-04 RX ORDER — DIPHENHYDRAMINE HCL 25 MG
25 CAPSULE ORAL EVERY 4 HOURS PRN
Status: DISCONTINUED | OUTPATIENT
Start: 2023-06-04 | End: 2023-06-08 | Stop reason: HOSPADM

## 2023-06-04 RX ORDER — TRISODIUM CITRATE DIHYDRATE AND CITRIC ACID MONOHYDRATE 500; 334 MG/5ML; MG/5ML
30 SOLUTION ORAL ONCE
Status: COMPLETED | OUTPATIENT
Start: 2023-06-04 | End: 2023-06-04

## 2023-06-04 RX ORDER — OXYCODONE HYDROCHLORIDE 5 MG/1
5 TABLET ORAL EVERY 4 HOURS PRN
Status: DISCONTINUED | OUTPATIENT
Start: 2023-06-04 | End: 2023-06-08 | Stop reason: HOSPADM

## 2023-06-04 RX ORDER — OXYTOCIN/0.9 % SODIUM CHLORIDE 30/500 ML
250 PLASTIC BAG, INJECTION (ML) INTRAVENOUS CONTINUOUS
Status: ACTIVE | OUTPATIENT
Start: 2023-06-04 | End: 2023-06-04

## 2023-06-04 RX ORDER — HYDROXYZINE 50 MG/1
50 TABLET, FILM COATED ORAL EVERY 6 HOURS PRN
Status: DISCONTINUED | OUTPATIENT
Start: 2023-06-04 | End: 2023-06-08 | Stop reason: HOSPADM

## 2023-06-04 RX ORDER — SODIUM CHLORIDE 0.9 % (FLUSH) 0.9 %
10 SYRINGE (ML) INJECTION AS NEEDED
Status: DISCONTINUED | OUTPATIENT
Start: 2023-06-04 | End: 2023-06-04

## 2023-06-04 RX ORDER — OXYTOCIN/0.9 % SODIUM CHLORIDE 30/500 ML
999 PLASTIC BAG, INJECTION (ML) INTRAVENOUS ONCE
Status: COMPLETED | OUTPATIENT
Start: 2023-06-04 | End: 2023-06-04

## 2023-06-04 RX ORDER — HYDROCORTISONE 25 MG/G
CREAM TOPICAL 3 TIMES DAILY PRN
Status: DISCONTINUED | OUTPATIENT
Start: 2023-06-04 | End: 2023-06-08 | Stop reason: HOSPADM

## 2023-06-04 RX ORDER — ONDANSETRON 2 MG/ML
4 INJECTION INTRAMUSCULAR; INTRAVENOUS ONCE AS NEEDED
Status: COMPLETED | OUTPATIENT
Start: 2023-06-04 | End: 2023-06-04

## 2023-06-04 RX ORDER — ONDANSETRON 2 MG/ML
4 INJECTION INTRAMUSCULAR; INTRAVENOUS EVERY 6 HOURS PRN
Status: DISCONTINUED | OUTPATIENT
Start: 2023-06-04 | End: 2023-06-08 | Stop reason: HOSPADM

## 2023-06-04 RX ORDER — DIPHENHYDRAMINE HYDROCHLORIDE 50 MG/ML
25 INJECTION INTRAMUSCULAR; INTRAVENOUS EVERY 4 HOURS PRN
Status: DISCONTINUED | OUTPATIENT
Start: 2023-06-04 | End: 2023-06-08 | Stop reason: HOSPADM

## 2023-06-04 RX ORDER — LIDOCAINE HYDROCHLORIDE 10 MG/ML
5 INJECTION, SOLUTION EPIDURAL; INFILTRATION; INTRACAUDAL; PERINEURAL AS NEEDED
Status: DISCONTINUED | OUTPATIENT
Start: 2023-06-04 | End: 2023-06-04

## 2023-06-04 RX ORDER — METHYLERGONOVINE MALEATE 0.2 MG/ML
200 INJECTION INTRAVENOUS ONCE AS NEEDED
Status: DISCONTINUED | OUTPATIENT
Start: 2023-06-04 | End: 2023-06-04

## 2023-06-04 RX ORDER — ONDANSETRON 4 MG/1
4 TABLET, FILM COATED ORAL EVERY 8 HOURS PRN
Status: DISCONTINUED | OUTPATIENT
Start: 2023-06-04 | End: 2023-06-08 | Stop reason: HOSPADM

## 2023-06-04 RX ORDER — FENTANYL CITRATE 50 UG/ML
INJECTION, SOLUTION INTRAMUSCULAR; INTRAVENOUS
Status: COMPLETED | OUTPATIENT
Start: 2023-06-04 | End: 2023-06-04

## 2023-06-04 RX ORDER — SIMETHICONE 80 MG
80 TABLET,CHEWABLE ORAL 4 TIMES DAILY PRN
Status: DISCONTINUED | OUTPATIENT
Start: 2023-06-04 | End: 2023-06-08 | Stop reason: HOSPADM

## 2023-06-04 RX ORDER — IBUPROFEN 600 MG/1
600 TABLET ORAL EVERY 6 HOURS
Status: DISCONTINUED | OUTPATIENT
Start: 2023-06-05 | End: 2023-06-08 | Stop reason: HOSPADM

## 2023-06-04 RX ORDER — DROPERIDOL 2.5 MG/ML
0.62 INJECTION, SOLUTION INTRAMUSCULAR; INTRAVENOUS
Status: DISCONTINUED | OUTPATIENT
Start: 2023-06-04 | End: 2023-06-08 | Stop reason: HOSPADM

## 2023-06-04 RX ORDER — BUPIVACAINE HYDROCHLORIDE 7.5 MG/ML
INJECTION, SOLUTION EPIDURAL; RETROBULBAR
Status: COMPLETED | OUTPATIENT
Start: 2023-06-04 | End: 2023-06-04

## 2023-06-04 RX ORDER — MISOPROSTOL 200 UG/1
800 TABLET ORAL ONCE AS NEEDED
Status: DISCONTINUED | OUTPATIENT
Start: 2023-06-04 | End: 2023-06-04

## 2023-06-04 RX ORDER — ACETAMINOPHEN 500 MG
1000 TABLET ORAL EVERY 6 HOURS
Status: COMPLETED | OUTPATIENT
Start: 2023-06-04 | End: 2023-06-05

## 2023-06-04 RX ORDER — NALOXONE HCL 0.4 MG/ML
0.2 VIAL (ML) INJECTION
Status: DISCONTINUED | OUTPATIENT
Start: 2023-06-04 | End: 2023-06-08 | Stop reason: HOSPADM

## 2023-06-04 RX ORDER — ONDANSETRON 2 MG/ML
INJECTION INTRAMUSCULAR; INTRAVENOUS AS NEEDED
Status: DISCONTINUED | OUTPATIENT
Start: 2023-06-04 | End: 2023-06-04 | Stop reason: SURG

## 2023-06-04 RX ORDER — ACETAMINOPHEN 500 MG
1000 TABLET ORAL ONCE
Status: COMPLETED | OUTPATIENT
Start: 2023-06-04 | End: 2023-06-04

## 2023-06-04 RX ORDER — ONDANSETRON 2 MG/ML
4 INJECTION INTRAMUSCULAR; INTRAVENOUS ONCE AS NEEDED
Status: DISCONTINUED | OUTPATIENT
Start: 2023-06-04 | End: 2023-06-08 | Stop reason: HOSPADM

## 2023-06-04 RX ADMIN — ONDANSETRON 4 MG: 2 INJECTION INTRAMUSCULAR; INTRAVENOUS at 10:40

## 2023-06-04 RX ADMIN — KETOROLAC TROMETHAMINE 15 MG: 15 INJECTION, SOLUTION INTRAMUSCULAR; INTRAVENOUS at 20:17

## 2023-06-04 RX ADMIN — CEFAZOLIN SODIUM 2 G: 2 INJECTION, SOLUTION INTRAVENOUS at 10:36

## 2023-06-04 RX ADMIN — SODIUM CHLORIDE, POTASSIUM CHLORIDE, SODIUM LACTATE AND CALCIUM CHLORIDE 1000 ML: 600; 310; 30; 20 INJECTION, SOLUTION INTRAVENOUS at 10:27

## 2023-06-04 RX ADMIN — SODIUM CHLORIDE, POTASSIUM CHLORIDE, SODIUM LACTATE AND CALCIUM CHLORIDE: 600; 310; 30; 20 INJECTION, SOLUTION INTRAVENOUS at 10:46

## 2023-06-04 RX ADMIN — Medication 100 MCG: at 11:01

## 2023-06-04 RX ADMIN — Medication 125 ML/HR: at 12:32

## 2023-06-04 RX ADMIN — Medication 999 ML/HR: at 11:07

## 2023-06-04 RX ADMIN — MORPHINE SULFATE 100 MCG: 4 INJECTION, SOLUTION INTRAMUSCULAR; INTRAVENOUS at 10:52

## 2023-06-04 RX ADMIN — Medication 100 MCG: at 11:07

## 2023-06-04 RX ADMIN — FENTANYL CITRATE 10 MCG: 50 INJECTION, SOLUTION INTRAMUSCULAR; INTRAVENOUS at 10:52

## 2023-06-04 RX ADMIN — Medication 100 MCG: at 10:56

## 2023-06-04 RX ADMIN — ONDANSETRON 4 MG: 2 INJECTION INTRAMUSCULAR; INTRAVENOUS at 11:09

## 2023-06-04 RX ADMIN — FAMOTIDINE 20 MG: 10 INJECTION INTRAVENOUS at 10:36

## 2023-06-04 RX ADMIN — BUPIVACAINE HYDROCHLORIDE 1.6 ML: 7.5 INJECTION, SOLUTION EPIDURAL; RETROBULBAR at 10:52

## 2023-06-04 RX ADMIN — ACETAMINOPHEN 1000 MG: 500 TABLET ORAL at 10:36

## 2023-06-04 RX ADMIN — KETOROLAC TROMETHAMINE 30 MG: 30 INJECTION, SOLUTION INTRAMUSCULAR; INTRAVENOUS at 11:31

## 2023-06-04 RX ADMIN — ACETAMINOPHEN 1000 MG: 500 TABLET, FILM COATED ORAL at 17:24

## 2023-06-04 RX ADMIN — Medication 100 MCG: at 10:52

## 2023-06-04 RX ADMIN — SODIUM CITRATE AND CITRIC ACID MONOHYDRATE 30 ML: 500; 334 SOLUTION ORAL at 10:36

## 2023-06-04 NOTE — LACTATION NOTE
P6. Pt reports she BF her other babies and pumped. This baby is transitioning in NICU. Set pt up on hgp with instructions on use , cleaning and sterilizing. Encouraged to pump every 3 hours. Pt reports she is tired and dizzy. RN notified. Encouraged pt to call LC as needed. She has personal pump at home.    Lactation Consult Note    Evaluation Completed: 2023 15:11 EDT  Patient Name: Candace Mustafa  :  1985  MRN:  [id number]     REFERRAL  INFORMATION:                                         DELIVERY HISTORY:        Skin to skin initiation date/time:      Skin to skin end date/time:           MATERNAL ASSESSMENT:                               INFANT ASSESSMENT:  Information for the patient's :  Ramsey Marycarmen [[id number]]   No past medical history on file.                                                                                                   MATERNAL INFANT FEEDING:                                                                       EQUIPMENT TYPE:                                 BREAST PUMPING:          LACTATION REFERRALS:

## 2023-06-04 NOTE — PLAN OF CARE
Problem: Adult Inpatient Plan of Care  Goal: Plan of Care Review  Outcome: Ongoing, Progressing  Flowsheets (Taken 6/4/2023 1200)  Progress: improving  Plan of Care Reviewed With: patient  Outcome Evaluation:   Patient is recovering in PACU from a rLTCD. Funuds firm and midline   VS WNL. Patient denies any pain at this time. Plan of care discussed and patient verbalized understanding. Will transfer to mother/baby after 2 hour recovery.  Goal: Patient-Specific Goal (Individualized)  Outcome: Ongoing, Progressing  Goal: Absence of Hospital-Acquired Illness or Injury  Outcome: Ongoing, Progressing  Intervention: Identify and Manage Fall Risk  Recent Flowsheet Documentation  Taken 6/4/2023 1145 by Glendy Sy RN  Safety Promotion/Fall Prevention: safety round/check completed  Taken 6/4/2023 1016 by Glendy Sy RN  Safety Promotion/Fall Prevention: safety round/check completed  Intervention: Prevent and Manage VTE (Venous Thromboembolism) Risk  Recent Flowsheet Documentation  Taken 6/4/2023 1145 by Glendy Sy RN  VTE Prevention/Management:   bilateral   sequential compression devices on  Taken 6/4/2023 1016 by Glendy Sy RN  VTE Prevention/Management:   bilateral   sequential compression devices on  Goal: Optimal Comfort and Wellbeing  Outcome: Ongoing, Progressing  Intervention: Provide Person-Centered Care  Recent Flowsheet Documentation  Taken 6/4/2023 1145 by Glendy Sy RN  Trust Relationship/Rapport:   care explained   choices provided   questions answered   questions encouraged  Taken 6/4/2023 1016 by Glendy Sy RN  Trust Relationship/Rapport:   care explained   choices provided   questions answered   questions encouraged  Goal: Readiness for Transition of Care  Outcome: Ongoing, Progressing  Intervention: Mutually Develop Transition Plan  Recent Flowsheet Documentation  Taken 6/4/2023 1014 by Glendy Sy RN  Transportation Anticipated: car, drives  self  Patient/Family Anticipated Services at Transition: none  Patient/Family Anticipates Transition to: home     Problem:  Fall Injury Risk  Goal: Absence of Fall, Infant Drop and Related Injury  Outcome: Ongoing, Progressing  Intervention: Promote Injury-Free Environment  Recent Flowsheet Documentation  Taken 2023 1145 by Glendy Sy, RN  Safety Promotion/Fall Prevention: safety round/check completed  Taken 2023 1016 by Glendy Sy RN  Safety Promotion/Fall Prevention: safety round/check completed     Problem: Skin Injury Risk Increased  Goal: Skin Health and Integrity  Outcome: Ongoing, Progressing     Problem: Adjustment to Role Transition (Postpartum  Delivery)  Goal: Successful Maternal Role Transition  Outcome: Ongoing, Progressing     Problem: Bleeding (Postpartum  Delivery)  Goal: Hemostasis  Outcome: Ongoing, Progressing     Problem: Infection (Postpartum  Delivery)  Goal: Absence of Infection Signs and Symptoms  Outcome: Ongoing, Progressing     Problem: Pain (Postpartum  Delivery)  Goal: Acceptable Pain Control  Outcome: Ongoing, Progressing     Problem: Postoperative Nausea and Vomiting (Postpartum  Delivery)  Goal: Nausea and Vomiting Relief  Outcome: Ongoing, Progressing     Problem: Postoperative Urinary Retention (Postpartum  Delivery)  Goal: Effective Urinary Elimination  Outcome: Ongoing, Progressing   Goal Outcome Evaluation:  Plan of Care Reviewed With: patient        Progress: improving  Outcome Evaluation: Patient is recovering in PACU from a rLTCD. Funuds firm and midline; VS WNL. Patient denies any pain at this time. Plan of care discussed and patient verbalized understanding. Will transfer to mother/baby after 2 hour recovery.

## 2023-06-04 NOTE — OBED NOTES
"[facility]  Candace Mustafa  : 1985  MRN: [id number]  CSN: [id number]    OB ED Provider Note    Subjective   No chief complaint on file.    Candace Mustafa is a 38 y.o. year old  with an Estimated Date of Delivery: 23 currently at 36w3d presenting with possible vaginal bleeding.  She awakened this morning with a small amount of blood in her underwear. She is unsure if the blood originated from her known hemorrhoid or from her vagina. She denies CTX or ROM.  FM is present.  She consumed some water around 0800.    Prenatal care has been with Dr. Stew Castellanos.  It has been complicated by previous C/S - (x 3 with history of scar dehiscence, for repeat ).    OB History    Para Term  AB Living   8 5 5 0 2 5   SAB IAB Ectopic Molar Multiple Live Births   2 0 0 0 0 5      # Outcome Date GA Lbr Yonatan/2nd Weight Sex Delivery Anes PTL Lv   8 Current            7 Term 21 37w2d  3245 g (7 lb 2.5 oz) F CS-LTranv Spinal N WAYNE      Birth Comments: Panda 1       Name: CHANDAN MUSTAFA      Apgar1: 9  Apgar5: 9   6 SAB 10/2020           5 Term 17 39w0d  3535 g (7 lb 12.7 oz) M CS-LTranv Spinal N WAYNE      Name: LILIAN MUSTAFA      Apgar1: 8  Apgar5: 9   4 Term 16 39w2d  4255 g (9 lb 6.1 oz) M CS-LTranv Spinal N WAYNE      Birth Comments: No complications J      Name: Jaime \"Luke\"      Apgar1: 8  Apgar5: 8   3 Term 14 38w1d  3827 g (8 lb 7 oz) F Vag-Spont EPI N WAYNE      Birth Comments: Shoulder dystocia      Complications: Shoulder Dystocia      Name: Keke      Apgar1: 7  Apgar5: 9   2 Term 01/15/13 40w0d  3685 g (8 lb 2 oz) F Vag-Spont EPI N WAYNE      Birth Comments: no [id number]      Name: Sofi      Apgar1: 8  Apgar5: 9   1 SAB 03/06/12 9w0d             Birth Comments: with D&C      Obstetric Comments   2021 7w5d Dating US:  Estimated Date of Delivery: 21 based on US hb    2021 27w0d normal interval growth: EFW 68% AC 71% hb, placenta posterior " with resolved previa hb    2021 35w0d normal interval growth: EFW 72% AC 91%, BPP 8/8., vtx thin TRAY but no defect  hb      Past Medical History:   Diagnosis Date    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 before onset of labor during third trimester of pregnancy 3/31/2021    Marginal placenta previa 3/31/2021    3/31/2021 marginal posterior previa 2021 resolved previa     Anemia      Past Surgical History:   Procedure Laterality Date    DILATATION AND CURETTAGE       SECTION Bilateral 2016    Procedure:  SECTION PRIMARY;  Surgeon: Ann Mitchell MD;  Location: [facility] LABOR DELIVERY;  Service:      SECTION N/A 2017    Procedure:  SECTION REPEAT;  Surgeon: Stew Castellanos MD;  Location: [facility] LABOR DELIVERY;  Service:      SECTION N/A 2021    Procedure:  SECTION REPEAT;  Surgeon: Stew Castellanos MD;  Location: [facility] LABOR DELIVERY;  Service: Obstetrics/Gynecology;  Laterality: N/A;    WISDOM TOOTH EXTRACTION       No current facility-administered medications for this encounter.    Allergies   Allergen Reactions    Sulfa Antibiotics Rash     Social History    Tobacco Use      Smoking status: Never      Smokeless tobacco: Never    Review of Systems   Genitourinary:  Positive for vaginal bleeding.   All other systems reviewed and are negative.      Objective   LMP 2022 (Exact Date)   General: well developed; well nourished  no acute distress   Abdomen: soft, non-tender; no masses  gravid    FHT's: reactive and category 1      Cervix: was checked (by me): 0 cm / 20 % / -3 no blood noted on glove.  External hemorrhoid noted   Presentation: cephalic   Contractions: every 2-4 minutes   Chest: Unlabored respirations    CV:  RRR   Ext:   No C/C/E   Back: CVA tenderness is deferred bilateral        Prenatal Labs  Lab Results   Component Value Date    HGB 11.3 2023    RUBELLAABIGG 1.31 2022    HEPBSAG Negative  2022    ABORH A Rh Positive 2014    ABSCRN Negative 2022    ZSE2XLK4 Non Reactive 2022    HEPCVIRUSABY <0.1 2022    GCT 94 2023    UNT8NUBM 95 2016    STREPGPB Negative 2023    URINECX Final report (A) 2023    CHLAMNAA Negative 03/15/2023    NGONORRHON Negative 03/15/2023       Current Labs Reviewed   UA:    Lab Results   Component Value Date    KETONESU Negative 2023    LEUKOCYTESUR Trace (A) 2023          Assessment   IUP at 36w3d  Previous section x 3- history of uterine dehiscence with uterine CTX     Plan   Admit to L&D for repeat . Dr. Castellanos notified and is assuming management.      Rey Woods MD  2023  09:05 EDT

## 2023-06-04 NOTE — NURSING NOTE
Pt and visitor transferred to 3E room 347 alert and oriented with all belongings. Call light within reach; ARCADIO Duran at bedside on arrival. Relinquishing care at this time.

## 2023-06-04 NOTE — OP NOTE
[facility]   Obstetrics and Gynecology     2023    Patient:Candace Mustafa   MR#:[id number]     Section Procedure Note    Indications: previous delivery type   (LTCS)    Pre-operative Diagnosis:   Intrauterine pregnancy at 36w3d  Labor status: Spontaneous Onset of Labor     Previous  section    History of 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 before onset of labor during third trimester of pregnancy    Pregnancy    Vaginal bleeding in pregnancy, third trimester    S/P  section      Post-operative Diagnosis: same  Uterine  noted    Procedure:  Low transverse  section     Surgeon: Stew Castellanos MD     Assistants:  LASHONDA REEVES    Anesthesia: Spinal anesthesia    Prenatal care problem list:  Patient Active Problem List   Diagnosis    Previous  section    AMA (advanced maternal age) multigravida 35+    History of 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 before onset of labor during third trimester of pregnancy    Supervision of high risk pregnancy, antepartum    Varicose veins during pregnancy    Normal prenatal genetic screen    Pregnancy    Vaginal bleeding in pregnancy, third trimester    S/P  section       Procedure Details   The patient was seen in the LDR preoperatively. The risks, benefits, complications, treatment options, and expected outcomes were discussed with the patient.  The patient concurred with the proposed plan, giving informed consent.  The site of surgery is discussed. The patient was taken to Operating Room # 1, identified as Candace Mustafa and the procedure verified as  Delivery. A Time Out was held and the above information confirmed.    After induction of anesthesia, the patient was draped and prepped in the usual sterile manner. A Pfannenstiel incision was made and carried down through the subcutaneous tissue to the fascia. Fascial incision was made and extended transversely. The fascia was   from the underlying rectus tissue superiorly and inferiorly. The peritoneum was identified and entered. Peritoneal incision was extended longitudinally. The utero-vesical peritoneal reflection was incised transversely and the bladder flap was bluntly freed from the lower uterine segment. A low transverse uterine incision was made. Delivered from vertex presentation was a female  fetus 3230 g (7 lb 1.9 oz)  with Apgar scores of 8 at one minute and 8 at five minutes. After the umbilical cord was clamped and cut cord blood was obtained for evaluation. The placenta was removed intact and appeared normal. The uterine outline, tubes and ovaries appeared normal.  The uterine incision was closed with running locked sutures of 0 monocryl. Hemostasis was observed. Lavage was carried out until clear.     Peritoneum was closed with 2-0 Vicryl in a running fashion incorporating the muscle in the closure    The fascia was then reapproximated with running sutures of 0 Vicryl. The deep subcutaneous layer was reapproximated with 3-0 Vicryl in a running fashion. The skin was reapproximated with 3-0 monocryl in a subcuticular fashion.     Instrument, sponge, and needle counts were correct prior the abdominal closure and at the conclusion of the case.     LASHONDA REEVES was responsible for performing the following activities: Retraction, Suction, Irrigation, Suturing, Closing, and Delivery of Fetus and their skilled assistance was necessary for the success of this case.      Findings:  Live viable female infant     Estimated Blood Loss:   500 cc    Calculated Blood Loss:  Quantitative Blood Loss (mL): 594 mL           Specimens:  Placenta            Complications:  None; patient tolerated the procedure well.           Disposition: PACU - hemodynamically stable.           Condition: stable    Attending Attestation: I was present and scrubbed for the entire procedure.    Cord gases:    pH, Cord Venous   Date Value Ref Range  Status   06/04/2023 7.407 (H) 7.260 - 7.400 pH Units Final       Stew Castellanos MD  6/4/2023   15:16 EDT

## 2023-06-04 NOTE — ANESTHESIA PROCEDURE NOTES
Peripheral IV    Patient location during procedure: pre-op  Start time: 6/4/2023 10:21 AM  End time: 6/4/2023 10:25 AM  Line placed for Fluids/Medication Admin and Difficult Access.  Performed By   Anesthesiologist: Aleisha Maldonado MD  Preanesthetic Checklist  Completed: patient identified, IV checked, site marked, risks and benefits discussed, surgical consent, monitors and equipment checked, pre-op evaluation and timeout performed  Peripheral IV Prep   Patient position: supine   Prep: ChloraPrep  Patient monitoring: cardiac monitor, continuous pulse ox and heart rate  Peripheral IV Procedure   Laterality:left  Location:  Forearm  Catheter size: 18 G  Guidance: ultrasound guided          Post Assessment   Dressing Type: tape and transparent.    IV Dressing/Site: clean, dry and intact  Additional Notes  Ultrasound Interpretation:  Using ultrasound guidance the potential vascular sites for insertion of the catheter were visualized to determine the patency of the vessel to be used for vascular access.  After selecting the appropriate site for insertion, the needle was visualized under ultrasound being inserted into the vessel, followed by ultrasound confirmation of catheter placement.  There were no abnormalities seen on ultrasound; an image was taken and the patient tolerated the procedure with no complications.

## 2023-06-04 NOTE — PLAN OF CARE
Goal Outcome Evaluation:   Vital WNL. Fundus and lochia WNL. Ambulated assist x2 to bathroom. Pumping with hospital pump. Beckman in place. Visiting baby in NICU with .

## 2023-06-04 NOTE — ANESTHESIA PROCEDURE NOTES
Spinal Block      Patient reassessed immediately prior to procedure    Patient location during procedure: OR  Start Time: 6/4/2023 10:52 AM  Stop Time: 6/4/2023 10:54 AM  Indication:at surgeon's request  Performed By  Anesthesiologist: Aleisha Maldonado MD  Preanesthetic Checklist  Completed: patient identified, IV checked, site marked, risks and benefits discussed, surgical consent, monitors and equipment checked, pre-op evaluation and timeout performed  Spinal Block Prep:  Patient Position:sitting  Sterile Tech:mask, sterile barriers, gloves and cap  Prep:Chloraprep  Patient Monitoring:blood pressure monitoring, continuous pulse oximetry and EKG    Spinal Block Procedure  Approach:midline  Guidance:palpation technique  Location:L4-L5  Needle Type:Doug  Needle Gauge:25 G  Placement of Spinal needle event:cerebrospinal fluid aspirated  Paresthesia: transient and left  Fluid Appearance:clear  Medications: fentaNYL citrate (PF) (SUBLIMAZE) injection - Intrathecal   10 mcg - 6/4/2023 10:52:00 AM  Morphine sulfate (PF) injection - Intrathecal   100 mcg - 6/4/2023 10:52:00 AM  bupivacaine PF (MARCAINE) 0.75 % injection - Spinal   1.6 mL - 6/4/2023 10:52:00 AM   Post Assessment  Patient Tolerance:patient tolerated the procedure well with no apparent complications  Complications no

## 2023-06-04 NOTE — TELEPHONE ENCOUNTER
Called pt back after she paged MD on call. She is 36 weeks pregnant and woke up and found a small amount of blood on her underwear. This occurred spontaneously overnight. She is also seeing a small amount of blood with wiping. She denies abd pain or ctx. She notes normal fetal movement. She will be having a repeat c/s for delivery. Advised Candace to proceed to L&D for evaluation. She noted agreement. L&D staff was notified.

## 2023-06-04 NOTE — H&P
[facility]   Obstetrics and Gynecology   History & Physical    2023    Patient: Candace Mustafa          MR#:[id number]    Chief Complaint   Patient presents with    Vaginal Bleeding     ANDRZEJ: pt here with complaints of waking with blood in her underwear.  Denies LOF or ctx. +FM       Subjective     38 y.o. female  at 36w3d present with complaint of vaginal bleeding and abdominal pain.  After arrival to , regular uterine contractions noted without significant bleeding.    Prenatal care complicated by a prior uterine scar dehiscence with CS planned this Friday.    Betamethasone give Wednesday for plan for early term delivery.        Patient Active Problem List   Diagnosis    Previous  section    AMA (advanced maternal age) multigravida 35+    History of 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 before onset of labor during third trimester of pregnancy    Supervision of high risk pregnancy, antepartum    Varicose veins during pregnancy    Normal prenatal genetic screen    Pregnancy    Vaginal bleeding in pregnancy, third trimester       Past Medical History:   Diagnosis Date    Anemia     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 before onset of labor during third trimester of pregnancy 3/31/2021    Marginal placenta previa 3/31/2021    3/31/2021 marginal posterior previa 2021 resolved previa        Past Surgical History:   Procedure Laterality Date     SECTION Bilateral 2016    Procedure:  SECTION PRIMARY;  Surgeon: Ann Mitchell MD;  Location: [facility] LABOR DELIVERY;  Service:      SECTION N/A 2017    Procedure:  SECTION REPEAT;  Surgeon: Stew Castellanos MD;  Location: [facility] LABOR DELIVERY;  Service:      SECTION N/A 2021    Procedure:  SECTION REPEAT;  Surgeon: Stew Castellanos MD;  Location: [facility] LABOR DELIVERY;  Service: Obstetrics/Gynecology;  Laterality: N/A;    DILATATION AND CURETTAGE   2012    WISDOM TOOTH EXTRACTION         Obstetric History:  OB History          8    Para   5    Term   5       0    AB   2    Living   5         SAB   2    IAB   0    Ectopic   0    Molar        Multiple   0    Live Births   5          Obstetric Comments   2021 7w5d Dating US:  Estimated Date of Delivery: 21 based on US hb   2021 27w0d normal interval growth: EFW 68% AC 71% hb, placenta posterior with resolved previa hb   2021 35w0d normal interval growth: EFW 72% AC 91%, BPP 8/8., vtx th in TRAY but no defect  hb                 Menstrual History:     Patient's last menstrual period was 2022 (exact date).       # 1 - Date: 12, Sex: None, Weight: None, GA: 9w0d, Delivery: None, Apgar1: None, Apgar5: None, Living: None, Birth Comments: with D&C    # 2 - Date: 01/15/13, Sex: Female, Weight: 3685 g (8 lb 2 oz), GA: 40w0d, Delivery: Vaginal, Spontaneous, Apgar1: 8, Apgar5: 9, Living: Living, Birth Comments: no [id number]    # 3 - Date: 14, Sex: Female, Weight: 3827 g (8 lb 7 oz), GA: 38w1d, Delivery: Vaginal, Spontaneous, Apgar1: 7, Apgar5: 9, Living: Living, Birth Comments: Shoulder dystocia    # 4 - Date: 16, Sex: Male, Weight: 4255 g (9 lb 6.1 oz), GA: 39w2d, Delivery: , Low Transverse, Apgar1: 8, Apgar5: 8, Living: Living, Birth Comments: No complications JKB    # 5 - Date: 17, Sex: Male, Weight: 3535 g (7 lb 12.7 oz), GA: 39w0d, Delivery: , Low Transverse, Apgar1: 8, Apgar5: 9, Living: Living, Birth Comments: None    # 6 - Date: 10/2020, Sex: None, Weight: None, GA: None, Delivery: None, Apgar1: None, Apgar5: None, Living: None, Birth Comments: None    # 7 - Date: 21, Sex: Female, Weight: 3245 g (7 lb 2.5 oz), GA: 37w2d, Delivery: , Low Transverse, Apgar1: 9, Apgar5: 9, Living: Living, Birth Comments: Javad Gipson     # 8 - Date: None, Sex: None, Weight: None, GA: None, Delivery: None, Apgar1: None, Apgar5: None,  Living: None, Birth Comments: None      Prenatal Information:  Prenatal Results       Initial Prenatal Labs       Test Value Reference Range Date Time    Hemoglobin  13.5 g/dL 11.1 - 15.9 11/14/22 1531    Hematocrit  40.8 % 34.0 - 46.6 11/14/22 1531    Platelets  259 x10E3/uL 150 - 450 11/14/22 1531    Rubella IgG  1.31 index Immune >0.99 11/14/22 1531    Hepatitis B SAg  Negative  Negative 11/14/22 1531    Hepatitis C Ab  <0.1 s/co ratio 0.0 - 0.9 11/14/22 1531    RPR  Non Reactive  Non Reactive 11/14/22 1531    T. Pallidum Ab         ABO  A   11/14/22 1531    Rh  Positive   11/14/22 1531    Antibody Screen  Negative  Negative 11/14/22 1531    HIV  Non Reactive  Non Reactive 11/14/22 1531    Urine Culture  Final report   05/03/23 0937       Final report   11/14/22 1537    Gonorrhea  Negative  Negative 03/15/23 1505       Negative  Negative 11/14/22 1539    Chlamydia  Negative  Negative 03/15/23 1505       Negative  Negative 11/14/22 1539    TSH  0.680 uIU/mL 0.270 - 4.200 02/22/23 1011       0.410 uIU/mL 0.450 - 4.500 11/14/22 1531    HgB A1c   5.1 % 4.8 - 5.6 11/14/22 1531    Varicella IgG        HgB Electrophoresis  ^ Normal   02/22/23     Cystic fibrosis  ^ Negative   02/22/23               Fetal testing        Test Value Reference Range Date Time    NIPT ^ declined   12/14/22     MSAFP        AFP-4                  2nd and 3rd Trimester       Test Value Reference Range Date Time    Hemoglobin (repeated)  11.3 g/dL 11.1 - 15.9 04/05/23 1350    Hematocrit (repeated)  32.8 % 34.0 - 46.6 04/05/23 1350    Platelets   211 x10E3/uL 150 - 450 04/05/23 1350       259 x10E3/uL 150 - 450 11/14/22 1531    GCT  94 mg/dL 70 - 139 04/05/23 1350    Antibody Screen (repeated)  Negative  Negative 11/14/22 1531    GTT Fasting        GTT 1 Hr        GTT 2 Hr        GTT 3 Hr        Group B Strep  Negative  Negative 05/24/23 1030              Other testing        Test Value Reference Range Date Time    Parvo IgG         CMV IgG                    Drug Screening       Test Value Reference Range Date Time    Amphetamine Screen        Barbiturate Screen        Benzodiazepine Screen        Methadone Screen        Phencyclidine Screen        Opiates Screen        THC Screen        Cocaine Screen        Propoxyphene Screen        Buprenorphine Screen        Methamphetamine Screen        Oxycodone Screen        Tricyclic Antidepressants Screen                  Legend    ^: Historical                          External Prenatal Results       Pregnancy Outside Results - Transcribed From Office Records - See Scanned Records For Details       Test Value Date Time    ABO  A  11/14/22 1531    Rh  Positive  11/14/22 1531    Antibody Screen  Negative  11/14/22 1531    Varicella IgG  1,437 index 01/18/21 1346    Rubella  1.31 index 11/14/22 1531    Hgb  11.3 g/dL 04/05/23 1350       13.5 g/dL 11/14/22 1531    Hct  32.8 % 04/05/23 1350       40.8 % 11/14/22 1531    Glucose Fasting GTT       Glucose Tolerance Test 1 hour ^ 95  02/16/16     Glucose Tolerance Test 3 hour       Gonorrhea (discrete)  Negative  03/15/23 1505       Negative  11/14/22 1539    Chlamydia (discrete)  Negative  03/15/23 1505       Negative  11/14/22 1539    RPR  Non Reactive  11/14/22 1531    VDRL       Syphilis Antibody       HBsAg  Negative  11/14/22 1531    Herpes Simplex Virus PCR       Herpes Simplex VIrus Culture       HIV  Non Reactive  11/14/22 1531    Hep C RNA Quant PCR       Hep C Antibody  <0.1 s/co ratio 11/14/22 1531    AFP       Group B Strep  Negative  05/24/23 1030    GBS Susceptibility to Clindamycin       GBS Susceptibility to Erythromycin       Fetal Fibronectin       Genetic Testing, Maternal Blood ^ declined  01/13/17               Drug Screening       Test Value Date Time    Urine Drug Screen       Amphetamine Screen       Barbiturate Screen       Benzodiazepine Screen       Methadone Screen       Phencyclidine Screen       Opiates Screen       THC Screen       Cocaine  Screen       Propoxyphene Screen       Buprenorphine Screen       Methamphetamine Screen       Oxycodone Screen       Tricyclic Antidepressants Screen                 Legend    ^: Historical                              Family History   Problem Relation Age of Onset    Breast cancer Mother 54    No Known Problems Father     No Known Problems Brother     No Known Problems Son     No Known Problems Daughter     Throat cancer Paternal Grandfather     No Known Problems Paternal Grandmother     Dementia Maternal Grandmother     No Known Problems Maternal Grandfather     No Known Problems Daughter     No Known Problems Son        Social History     Tobacco Use    Smoking status: Never    Smokeless tobacco: Never   Vaping Use    Vaping Use: Never used   Substance Use Topics    Alcohol use: Yes     Comment: Social    Drug use: No       Sulfa antibiotics      Current Facility-Administered Medications:     carboprost (HEMABATE) injection 250 mcg, 250 mcg, Intramuscular, Q15 Min PRN, Stew Castellanos MD    ceFAZolin in dextrose (ANCEF) IVPB solution 2 g, 2 g, Intravenous, Once, Stew Castellanos MD, 2 g at 06/04/23 1036    erythromycin (ROMYCIN) 5 MG/GM ophthalmic ointment  - ADS Override Pull, , , ,     ketorolac (TORADOL) injection 30 mg, 30 mg, Intravenous, Once, Stew Castellanos MD    lactated ringers bolus 1,000 mL, 1,000 mL, Intravenous, Once, Stew Castellanos MD, Last Rate: 1,000 mL/hr at 06/04/23 1027, 1,000 mL at 06/04/23 1027    lactated ringers infusion, 125 mL/hr, Intravenous, Continuous, Stew Castellanos MD    lidocaine PF 1% (XYLOCAINE) injection 5 mL, 5 mL, Intradermal, PRN, Stew Castellanos MD    methylergonovine (METHERGINE) injection 200 mcg, 200 mcg, Intramuscular, Once PRN, Stew Castellanos MD    miSOPROStol (CYTOTEC) tablet 800 mcg, 800 mcg, Rectal, Once PRN, Stew Castellanos MD    oxytocin (PITOCIN) 30 units in 0.9% sodium chloride 500 mL (premix), 999 mL/hr, Intravenous, Once **FOLLOWED BY** oxytocin (PITOCIN) 30  units in 0.9% sodium chloride 500 mL (premix), 250 mL/hr, Intravenous, Continuous, Stew Castellanos MD    phytonadione (VITAMIN K) 1 MG/0.5ML injection  - ADS Override Pull, , , ,     sodium chloride 0.9 % flush 10 mL, 10 mL, Intravenous, Q12H, Stew Castellanos MD    sodium chloride 0.9 % flush 10 mL, 10 mL, Intravenous, PRN, Stew Castellanos MD    Review of Systems  Review of Systems   Constitutional: Negative.    Respiratory: Negative.     Cardiovascular: Negative.    Gastrointestinal:  Positive for abdominal pain.   Genitourinary:  Positive for vaginal bleeding.   Psychiatric/Behavioral: Negative.       Objective     Vital Signs  Temp:  [98.2 °F (36.8 °C)] 98.2 °F (36.8 °C)  Heart Rate:  [65] 65  Resp:  [16] 16  BP: (116)/(69) 116/69    Physical Exam:  Physical Exam  Vitals and nursing note reviewed.   Constitutional:       Appearance: She is well-developed.   HENT:      Head: Normocephalic and atraumatic.   Cardiovascular:      Rate and Rhythm: Normal rate.   Pulmonary:      Effort: Pulmonary effort is normal.   Abdominal:      General: Bowel sounds are normal. There is no distension.      Palpations: Abdomen is soft.      Tenderness: There is no abdominal tenderness.   Skin:     General: Skin is warm and dry.   Neurological:      Mental Status: She is alert and oriented to person, place, and time.   Psychiatric:         Behavior: Behavior normal.         Thought Content: Thought content normal.         Judgment: Judgment normal.       Labs:  Results from last 7 days   Lab Units 23  1041   WBC 10*3/mm3 9.17   HEMOGLOBIN g/dL 10.0*   HEMATOCRIT % 30.0*   PLATELETS 10*3/mm3 217           Invalid input(s): LABALBU, PROT      Assessment & Plan     1. Intrauterine pregnancy at 36w3d    Previous  section    History of 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 before onset of labor during third trimester of pregnancy    Pregnancy    Vaginal bleeding in pregnancy, third trimester  Early  labor    Plan:  Repeat        Reviewed procedure with patient.  I discussed the risks including but not limited to bleeding, infection and damage to internal organs.  Understanding of the procedure is voiced.     Stew Castellanos MD  23  10:48 EDT      Patient Care Team:  Kehrer, Meredith Lea, MD as PCP - General (Family Medicine)  Mandy Hayes PA as Physician Assistant (Obstetrics and Gynecology)

## 2023-06-04 NOTE — ANESTHESIA POSTPROCEDURE EVALUATION
Patient: Candace Mustafa    Procedure Summary       Date: 23 Room / Location:  [facility]   [facility]    Anesthesia Start: 1046 Anesthesia Stop: 1141    Procedure:  SECTION REPEAT (Abdomen) Diagnosis:       Previous  section      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 before onset of labor during third trimester of pregnancy      (Previous  section [Z98.891])      (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 before onset of labor during third trimester of pregnancy [O71.03])    Surgeons: Stew Castellanos MD Provider:     Anesthesia Type: spinal ASA Status: 2            Anesthesia Type: spinal    Vitals  Vitals Value Taken Time   /77 23 1355   Temp 36.1 °C (97 °F) 23 1355   Pulse 58 23 1355   Resp 16 23 1355   SpO2 100 % 23 1355           Post Anesthesia Care and Evaluation    Patient location during evaluation: bedside  Patient participation: complete - patient participated  Level of consciousness: awake and alert  Pain management: adequate    Airway patency: patent  Anesthetic complications: No anesthetic complications  PONV Status: controlled  Cardiovascular status: acceptable  Respiratory status: acceptable  Hydration status: acceptable

## 2023-06-04 NOTE — ANESTHESIA PREPROCEDURE EVALUATION
Anesthesia Evaluation     Patient summary reviewed and Nursing notes reviewed   no history of anesthetic complications:   NPO Solid Status: > 8 hours  NPO Liquid Status: > 2 hours           Airway   Mallampati: II  TM distance: >3 FB  Neck ROM: full  No difficulty expected  Dental      Pulmonary    Cardiovascular         Neuro/Psych  GI/Hepatic/Renal/Endo      Musculoskeletal     Abdominal    Substance History      OB/GYN    (+) Pregnant        Other                      Anesthesia Plan    ASA 2     spinal     (Repeat C/s came laboring    36w3d    /69 (BP Location: Right arm, Patient Position: Sitting)   Pulse 65   Temp 36.8 °C (98.2 °F) (Oral)   Resp 16   LMP 09/16/2022 (Exact Date)   SpO2 100%     I have reviewed the patient's history with the patient and the chart, including all pertinent laboratory results and imaging. I have explained the risks of spinal anesthesia including but not limited to hypotension, PDPH, nerve injury and risk of converting to GA. Patient understands risks and agrees to proceed.    )    Anesthetic plan, risks, benefits, and alternatives have been provided, discussed and informed consent has been obtained with: patient.    CODE STATUS:

## 2023-06-05 LAB
BASOPHILS # BLD AUTO: 0.04 10*3/MM3 (ref 0–0.2)
BASOPHILS NFR BLD AUTO: 0.4 % (ref 0–1.5)
DEPRECATED RDW RBC AUTO: 38.3 FL (ref 37–54)
EOSINOPHIL # BLD AUTO: 0.32 10*3/MM3 (ref 0–0.4)
EOSINOPHIL NFR BLD AUTO: 2.8 % (ref 0.3–6.2)
ERYTHROCYTE [DISTWIDTH] IN BLOOD BY AUTOMATED COUNT: 12.1 % (ref 12.3–15.4)
HCT VFR BLD AUTO: 28.2 % (ref 34–46.6)
HGB BLD-MCNC: 9.6 G/DL (ref 12–15.9)
IMM GRANULOCYTES # BLD AUTO: 0.14 10*3/MM3 (ref 0–0.05)
IMM GRANULOCYTES NFR BLD AUTO: 1.2 % (ref 0–0.5)
LYMPHOCYTES # BLD AUTO: 1.81 10*3/MM3 (ref 0.7–3.1)
LYMPHOCYTES NFR BLD AUTO: 16 % (ref 19.6–45.3)
MCH RBC QN AUTO: 29.5 PG (ref 26.6–33)
MCHC RBC AUTO-ENTMCNC: 34 G/DL (ref 31.5–35.7)
MCV RBC AUTO: 86.8 FL (ref 79–97)
MONOCYTES # BLD AUTO: 0.63 10*3/MM3 (ref 0.1–0.9)
MONOCYTES NFR BLD AUTO: 5.6 % (ref 5–12)
NEUTROPHILS NFR BLD AUTO: 74 % (ref 42.7–76)
NEUTROPHILS NFR BLD AUTO: 8.35 10*3/MM3 (ref 1.7–7)
NRBC BLD AUTO-RTO: 0 /100 WBC (ref 0–0.2)
PLATELET # BLD AUTO: 237 10*3/MM3 (ref 140–450)
PMV BLD AUTO: 10.7 FL (ref 6–12)
RBC # BLD AUTO: 3.25 10*6/MM3 (ref 3.77–5.28)
WBC NRBC COR # BLD: 11.29 10*3/MM3 (ref 3.4–10.8)

## 2023-06-05 PROCEDURE — 85025 COMPLETE CBC W/AUTO DIFF WBC: CPT | Performed by: OBSTETRICS & GYNECOLOGY

## 2023-06-05 PROCEDURE — 25010000002 KETOROLAC TROMETHAMINE PER 15 MG: Performed by: OBSTETRICS & GYNECOLOGY

## 2023-06-05 RX ORDER — DOCUSATE SODIUM 100 MG/1
100 CAPSULE, LIQUID FILLED ORAL 2 TIMES DAILY
Status: DISCONTINUED | OUTPATIENT
Start: 2023-06-05 | End: 2023-06-08 | Stop reason: HOSPADM

## 2023-06-05 RX ADMIN — KETOROLAC TROMETHAMINE 15 MG: 15 INJECTION, SOLUTION INTRAMUSCULAR; INTRAVENOUS at 02:42

## 2023-06-05 RX ADMIN — ACETAMINOPHEN 650 MG: 325 TABLET, FILM COATED ORAL at 17:25

## 2023-06-05 RX ADMIN — DOCUSATE SODIUM 100 MG: 100 CAPSULE, LIQUID FILLED ORAL at 11:32

## 2023-06-05 RX ADMIN — ACETAMINOPHEN 1000 MG: 500 TABLET, FILM COATED ORAL at 00:12

## 2023-06-05 RX ADMIN — ACETAMINOPHEN 1000 MG: 500 TABLET, FILM COATED ORAL at 06:14

## 2023-06-05 RX ADMIN — IBUPROFEN 600 MG: 600 TABLET ORAL at 17:25

## 2023-06-05 RX ADMIN — ACETAMINOPHEN 1000 MG: 500 TABLET, FILM COATED ORAL at 11:32

## 2023-06-05 RX ADMIN — KETOROLAC TROMETHAMINE 15 MG: 15 INJECTION, SOLUTION INTRAMUSCULAR; INTRAVENOUS at 11:32

## 2023-06-05 NOTE — PROGRESS NOTES
[facility]   Obstetrics and Gynecology     2023    Patient: Candace Mustafa   MR#:[id number]        Progress note         HD#1  Post-Op Day 1 S/P    Delivered a female infant.    Subjective     Candace Mustafa is a 38 y.o. female  post operative from CS at 36w3d weeks  Patient reports:  Pain is well controlled. Voiding and ambulating without difficulty.  Tolerating po. Lochia normal.     Breast/bottle The patient is currently breastfeeding.  Baby is in NICU.    Patient Active Problem List   Diagnosis    Previous  section    AMA (advanced maternal age) multigravida 35+    History of 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 before onset of labor during third trimester of pregnancy    Supervision of high risk pregnancy, antepartum    Varicose veins during pregnancy    Normal prenatal genetic screen    Pregnancy    Vaginal bleeding in pregnancy, third trimester    S/P  section        Objective      Vital Signs Range for the last 24 hours    Temperature: Temp:  [96.6 °F (35.9 °C)-97.8 °F (36.6 °C)] 97.3 °F (36.3 °C)  BP:  BP: ()/(57-78) 98/65  Pulse:  Heart Rate:  [55-73] 65  Respirations: Resp:  [16-17] 16  Weight: 88 kg (194 lb)   BMI:  Body mass index is 31.31 kg/m².    I/O last 3 completed shifts:  In: 1236 [I.V.:1000; Other:236]  Out: 1519 [Urine:925; Blood:594]  No intake/output data recorded.     Physical Exam  Vitals and nursing note reviewed.   Constitutional:       General: She is not in acute distress.     Appearance: Normal appearance.   Cardiovascular:      Pulses: Normal pulses.   Pulmonary:      Effort: Pulmonary effort is normal.   Abdominal:      General: There is no distension.      Palpations: Abdomen is soft.      Tenderness: There is no abdominal tenderness. There is no guarding or rebound.      Comments: Dressing c/d/i   Musculoskeletal:         General: No swelling or tenderness.      Right lower leg: No edema.      Left lower leg:  No edema.   Neurological:      Mental Status: She is alert and oriented to person, place, and time.   Psychiatric:         Mood and Affect: Mood normal.         Behavior: Behavior normal.       LABS:    Results from last 7 days   Lab Units 23  0659 23  1041   WBC 10*3/mm3 11.29* 9.17   HEMOGLOBIN g/dL 9.6* 10.0*   HEMATOCRIT % 28.2* 30.0*   PLATELETS 10*3/mm3 237 217             Assessment & Plan     1.  POD #1 S/P C/S:  Hemodynamically stable.  Doing well.     Previous  section    History of 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 before onset of labor during third trimester of pregnancy    Pregnancy    Vaginal bleeding in pregnancy, third trimester    S/P  section      Plan:    Continue routine postpartum care  Discussed with RN removing bandage when patient returns to unit  Baby in NICU      So La MD  2023  10:29 EDT

## 2023-06-05 NOTE — LACTATION NOTE
"This note was copied from a baby's chart.  P6 36w4d baby in NICU. Mom has been pumping with [facility] \"getting a little bit of milk\". She reports her milk usually drys up after 3 months, she has personal breast pump at home and denies any questions now. Encouraged to call for any assistance and to continue pumping every 3hrs.  "

## 2023-06-06 RX ADMIN — IBUPROFEN 600 MG: 600 TABLET ORAL at 21:42

## 2023-06-06 RX ADMIN — ACETAMINOPHEN 650 MG: 325 TABLET, FILM COATED ORAL at 16:15

## 2023-06-06 RX ADMIN — IBUPROFEN 600 MG: 600 TABLET ORAL at 06:11

## 2023-06-06 RX ADMIN — ACETAMINOPHEN 650 MG: 325 TABLET, FILM COATED ORAL at 06:11

## 2023-06-06 RX ADMIN — DOCUSATE SODIUM 100 MG: 100 CAPSULE, LIQUID FILLED ORAL at 21:42

## 2023-06-06 RX ADMIN — OXYCODONE 5 MG: 5 TABLET ORAL at 10:23

## 2023-06-06 RX ADMIN — ACETAMINOPHEN 650 MG: 325 TABLET, FILM COATED ORAL at 00:08

## 2023-06-06 RX ADMIN — SIMETHICONE 80 MG: 80 TABLET, CHEWABLE ORAL at 08:33

## 2023-06-06 RX ADMIN — SIMETHICONE 80 MG: 80 TABLET, CHEWABLE ORAL at 00:11

## 2023-06-06 RX ADMIN — IBUPROFEN 600 MG: 600 TABLET ORAL at 14:25

## 2023-06-06 RX ADMIN — IBUPROFEN 600 MG: 600 TABLET ORAL at 00:08

## 2023-06-06 NOTE — PROGRESS NOTES
" POSTPARTUM ROUNDS    SUBJECTIVE:    CC:  POD2 from CS    Subjective:  Pt is doing well, pain is well controlled, pt is ambulating and tolerating a regular diet.  Voiding well.  Spending time in NICU, baby is well and progressing    Review of Systems - Negative except cramping and back pain  NO FEVER OR CHILLS , NO NAUSEA OR VOMITING, NO LEG PAIN    OBJECTIVE:  Vital Signs: /77 (BP Location: Left arm, Patient Position: Lying)   Pulse 67   Temp 98 °F (36.7 °C) (Oral)   Resp 16   Ht 167.6 cm (66\")   Wt 88 kg (194 lb)   LMP 2022 (Exact Date)   SpO2 95%   Breastfeeding Yes   BMI 31.31 kg/m²     Intake/Output Summary (Last 24 hours) at 2023 1113  Last data filed at 2023 1330  Gross per 24 hour   Intake --   Output 350 ml   Net -350 ml       Constitutional: The patient is well nourished. Alert and oriented.  Mental status is upbeat, no signs postpartum depression.   Cardiovascular:RRR  Resp: nonlabored breathing  The incision is  clean, dry and Intact   Gastrointestinal: fundus firm below umbilicus  Extremities: 1+edema bilateral, non-tender bilateral    LABS / IMAGING:  Lab Results (last 24 hours)       ** No results found for the last 24 hours. **            ASSESSMENT AND PLAN:    POD 2 from  delivery:    Patient Active Problem List   Diagnosis    Previous  section    AMA (advanced maternal age) multigravida 35+    History of 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 before onset of labor during third trimester of pregnancy    Supervision of high risk pregnancy, antepartum    Varicose veins during pregnancy    Normal prenatal genetic screen    Pregnancy    Vaginal bleeding in pregnancy, third trimester    S/P  section       Patient is postop day 2 from LTCS  Patient is doing well   Encourage ambulation     Domi Hall MD    2023  11:13 EDT       "

## 2023-06-06 NOTE — PLAN OF CARE
Goal Outcome Evaluation:                      Increased activity in halls, pain within acceptable levels with medication, bleeding at a minimum and without clots

## 2023-06-07 RX ADMIN — IBUPROFEN 600 MG: 600 TABLET ORAL at 21:57

## 2023-06-07 RX ADMIN — DOCUSATE SODIUM 100 MG: 100 CAPSULE, LIQUID FILLED ORAL at 10:40

## 2023-06-07 RX ADMIN — OXYCODONE 5 MG: 5 TABLET ORAL at 01:57

## 2023-06-07 RX ADMIN — ACETAMINOPHEN 650 MG: 325 TABLET, FILM COATED ORAL at 01:57

## 2023-06-07 RX ADMIN — DOCUSATE SODIUM 100 MG: 100 CAPSULE, LIQUID FILLED ORAL at 21:57

## 2023-06-07 RX ADMIN — IBUPROFEN 600 MG: 600 TABLET ORAL at 12:54

## 2023-06-07 RX ADMIN — ACETAMINOPHEN 650 MG: 325 TABLET, FILM COATED ORAL at 16:54

## 2023-06-07 RX ADMIN — ACETAMINOPHEN 650 MG: 325 TABLET, FILM COATED ORAL at 10:40

## 2023-06-07 RX ADMIN — IBUPROFEN 600 MG: 600 TABLET ORAL at 03:57

## 2023-06-07 NOTE — PROGRESS NOTES
[facility]   Obstetrics and Gynecology     2023    Name:Candace Mustafa    MR#:[id number]     Progress Note:  Post-Op    HD:3    Subjective   38 y.o. yo Female  s/p CS at 36w3d doing well. Pain well controlled. Tolerating regular diet and having flatus. Lochia normal.     Patient Active Problem List   Diagnosis    Previous  section    AMA (advanced maternal age) multigravida 35+    History of 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 before onset of labor during third trimester of pregnancy    Supervision of high risk pregnancy, antepartum    Varicose veins during pregnancy    Normal prenatal genetic screen    Pregnancy    Vaginal bleeding in pregnancy, third trimester    S/P  section        Objective    Vitals  Temp:  Temp:  [97.8 °F (36.6 °C)-98.1 °F (36.7 °C)] 97.8 °F (36.6 °C)  Temp src: Oral  BP:  BP: (111-137)/(74-84) 137/84  Pulse:  Heart Rate:  [71-75] 71  RR:   Resp:  [16] 16  Weight: 88 kg (194 lb)  BMI:  Body mass index is 31.31 kg/m².    Physical Exam  Vitals and nursing note reviewed.   Constitutional:       Appearance: Normal appearance. She is normal weight.   Pulmonary:      Effort: Pulmonary effort is normal.   Neurological:      Mental Status: She is alert and oriented to person, place, and time.   Psychiatric:         Mood and Affect: Mood normal.         Behavior: Behavior normal.       I/O last 3 completed shifts:  In: 1260 [P.O.:1260]  Out: 1300 [Urine:1300]    LABS:  Results from last 7 days   Lab Units 23  0659 23  1041   WBC 10*3/mm3 11.29* 9.17   HEMOGLOBIN g/dL 9.6* 10.0*   HEMATOCRIT % 28.2* 30.0*   PLATELETS 10*3/mm3 237 217           Infant:   female       Assessment    1.  POD#3     Previous  section    History of 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 before onset of labor during third trimester of pregnancy    Pregnancy    Vaginal bleeding in pregnancy, third trimester    S/P   section      Plan:  Continue routine postoperative care     Glendy Ritter MD  6/7/2023 11:05 EDT

## 2023-06-07 NOTE — PLAN OF CARE
Goal Outcome Evaluation:   Vitals WNL. Fundus and lochia WNL. Ambulating independently to and from NICU to visit baby. Mild-moderate pain controlled with scheduled medications. Abdominal binder in use. Voiding freely. Pumping.

## 2023-06-07 NOTE — LACTATION NOTE
Patient came back to room from NICU. This is her 6th baby . She typically nurses each child x 3 months . This baby is not at breast yet and mom is pumping and starting to collect milk for NICU.  Patient knows she can call LC if help is needed . LC # on WB

## 2023-06-07 NOTE — PLAN OF CARE
Goal Outcome Evaluation:                   Incision intact , no drainage, bleeding at a minimum and without clots, pain within acceptable level with medication

## 2023-06-08 VITALS
DIASTOLIC BLOOD PRESSURE: 86 MMHG | HEIGHT: 66 IN | RESPIRATION RATE: 16 BRPM | TEMPERATURE: 97.9 F | HEART RATE: 76 BPM | OXYGEN SATURATION: 95 % | SYSTOLIC BLOOD PRESSURE: 127 MMHG | BODY MASS INDEX: 31.18 KG/M2 | WEIGHT: 194 LBS

## 2023-06-08 PROCEDURE — 0503F POSTPARTUM CARE VISIT: CPT | Performed by: OBSTETRICS & GYNECOLOGY

## 2023-06-08 RX ORDER — OXYCODONE HYDROCHLORIDE AND ACETAMINOPHEN 5; 325 MG/1; MG/1
1 TABLET ORAL EVERY 4 HOURS PRN
Qty: 12 TABLET | Refills: 0 | Status: SHIPPED | OUTPATIENT
Start: 2023-06-08

## 2023-06-08 RX ORDER — OXYCODONE HYDROCHLORIDE 5 MG/1
5 TABLET ORAL EVERY 4 HOURS PRN
Qty: 12 TABLET | Refills: 0 | Status: SHIPPED | OUTPATIENT
Start: 2023-06-08 | End: 2023-06-11

## 2023-06-08 RX ORDER — OXYCODONE HYDROCHLORIDE 5 MG/1
5 TABLET ORAL EVERY 4 HOURS PRN
Qty: 12 TABLET | Refills: 0 | OUTPATIENT
Start: 2023-06-08

## 2023-06-08 RX ORDER — NALOXONE HYDROCHLORIDE 4 MG/.1ML
SPRAY NASAL
Qty: 2 EACH | Refills: 0 | Status: SHIPPED | OUTPATIENT
Start: 2023-06-08

## 2023-06-08 RX ORDER — IBUPROFEN 600 MG/1
600 TABLET ORAL EVERY 6 HOURS
Qty: 24 TABLET | Refills: 0 | Status: SHIPPED | OUTPATIENT
Start: 2023-06-08

## 2023-06-08 RX ADMIN — IBUPROFEN 600 MG: 600 TABLET ORAL at 05:58

## 2023-06-08 RX ADMIN — DOCUSATE SODIUM 100 MG: 100 CAPSULE, LIQUID FILLED ORAL at 08:49

## 2023-06-08 RX ADMIN — ACETAMINOPHEN 650 MG: 325 TABLET, FILM COATED ORAL at 08:48

## 2023-06-08 RX ADMIN — SIMETHICONE 80 MG: 80 TABLET, CHEWABLE ORAL at 08:49

## 2023-06-08 RX ADMIN — OXYCODONE 5 MG: 5 TABLET ORAL at 00:50

## 2023-06-08 RX ADMIN — ACETAMINOPHEN 650 MG: 325 TABLET, FILM COATED ORAL at 00:50

## 2023-06-08 NOTE — DISCHARGE SUMMARY
OB Discharge Summary C/S    This  female, was admitted on 2023 and underwent a , Low Transverse  on 2023 , resulting in the birth of the following:  Infant         Weight:   Birth Information  YOB: 2022   Time of birth: 11:05 AM   Delivering clinician: Stew Castellanos   Sex: female   Delivery type: , Low Transverse   Breech type (if applicable):     Observed anomalies/comments: Panda in OR1         Observations/Comments:  Pandelke in OR1      Apgars: 8  @ 1 minute /    8  @ 5 minutes     LABS:   Lab Results (last 72 hours)       ** No results found for the last 72 hours. **            ROS:  Pulm: neg for soa  GI: neg for heavy bleeding              Musculoskel: neg for leg pain        OB History    Para Term  AB Living   8 6 5 1 2 6   SAB IAB Ectopic Molar Multiple Live Births   2 0 0   0 6      # Outcome Date GA Lbr Yonatan/2nd Weight Sex Delivery Anes PTL Lv   8  23 36w3d  3230 g (7 lb 1.9 oz) F CS-LTranv Spinal Y WAYNE      Birth Comments: Javad in OR1      Complications: H/O  section, Anemia, unspecified type   7 Term 21 37w2d  3245 g (7 lb 2.5 oz) F CS-LTranv Spinal N WAYNE      Birth Comments: Panda 1    6 SAB 10/2020           5 Term 17 39w0d  3535 g (7 lb 12.7 oz) M CS-LTranv Spinal N WAYNE   4 Term 16 39w2d  4255 g (9 lb 6.1 oz) M CS-LTranv Spinal N WAYNE      Birth Comments: No complications JKB   3 Term 14 38w1d  3827 g (8 lb 7 oz) F Vag-Spont EPI N WAYNE      Birth Comments: Shoulder dystocia      Complications: Shoulder Dystocia   2 Term 01/15/13 40w0d  3685 g (8 lb 2 oz) F Vag-Spont EPI N WAYNE      Birth Comments: no [id number]   1 SAB 12 9w0d             Birth Comments: with D&C      Obstetric Comments   2021 7w5d Dating US:  Estimated Date of Delivery: 21 based on US hb    2021 27w0d normal interval growth: EFW 68% AC 71% hb, placenta posterior with resolved previa hb    2021 35w0d  normal interval growth: EFW 72% AC 91%, BPP 8/8., vtx thin TRAY but no defect  hb         Discharge diagnosis:     Medical Problems       Hospital Problem List       Previous  section    Overview Addendum 2023 10:46 AM by Stew Castellanos MD     RS at 37w3d on 23 for prior uterine window           History of 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 before onset of labor during third trimester of pregnancy    Overview Addendum 2023  9:25 AM by Stew Castellanos MD     Plan:  Repeat  37-38 weeks      schedule 23           Pregnancy    Vaginal bleeding in pregnancy, third trimester    S/P  section                                                        Previous  section [Z98.891]  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 before onset of labor during third trimester of pregnancy [O71.03]  Pregnancy [Z34.90]                                                   section at 36w3d, uncomplicated recovery      Post operatively the patient did well. She was tolerating a regular diet, ambulating, voiding and passing flatus. Post op hemoglobin was   Lab Results   Component Value Date    HGB 9.6 (L) 2023   .     On day of discharge, uterus was firm, incision was clean, dry and intact, extremities were non tender with no erythema or masses.     Pt was given prescriptions for Percocet 5/325 and Motrin PRN for pain.    Instructed to call the office to make an appointment in 2  and 6  Weeks and to    please call the office, or the OB/GYN on-call if after-hours, with any questions,concerns or  any of the followin) Fever - a temperature greater than 100.4  2) Increased pain  3) Heavy vaginal bleeding (soaking more than 1 pad in an hour)  4) Foul-smelling discharge from the vagina  5) Red, painful incision or foul-smelling discharge from incision.    She was instructed to  not place anything in the vagina -  Including  tampons, douches or having sex -  until after her  6 week postpartum visit to prevent infection.    Wash your incision everyday with warm water and gentle soap. You may pat it dry.

## 2023-06-08 NOTE — PLAN OF CARE
Goal Outcome Evaluation:  Plan of Care Reviewed With: patient        Progress: improving  Outcome Evaluation: VSS.  Pt up ad alissa and voiding without difficulty.  Pt ambulates often to the NICU.  Fundal assessment and lochia, wnl.  Incision c/d/i .  Pain controlled w/scheduled tylenol and ibuprofen and prn roxicodone.

## 2023-06-08 NOTE — PROGRESS NOTES
"Discharge Planning Assessment  [facility]     Patient Name: Candace Mustafa  MRN: [id number]  Today's Date: 6/8/2023    Admit Date: 6/4/2023    Plan: Infant may discharge to mother when medically ready. NAZARIO Gil.   Discharge Needs Assessment    No documentation.                  Discharge Plan       Row Name 06/08/23 1027       Plan    Plan Infant may discharge to mother when medically ready. NAZARIO Gil.    Plan Comments Mother: Candace Mustafa, MRN: [id number]; infant: Yangrl \"Mimi\" Ramsey, MRN: [id number]. CSW consulted for \"NICU admit.\" Of note, no toxicology screens were ordered for mother or infant as need was not warranted at this time. CSW met with mother alone in infant's NICU room. Mother verified address, phone number, and insurance. Mother reports she understands the process of adding infant to health insurance. Mother reports having a car seat, crib/bassinet, clothes, and diapers for infant. Mother and father have five other children: 10yr old female, 9yr old female, 7yr old male, 6yr old male, & 2yr old female, who are being cared for by maternal and paternal grandparents during hospital stay. Mother reports maternal grandparents, paternal grandparents, father of infant/, and other family members are available for support as needed. Mother reports infant [place] [facility] [place]; mother is comfortable scheduling appointments for infant and has transportation. Mother is not current with [facility] but is familiar with the program. Mother denies any violence, threats, or feeling unsafe at home or relationship. CSW spoke to mother about the HANDS program and mother declined CSW making a referral today. CSW provided mother with a packet of resources including: WIC, HANDS, transportation, infant supplies, counseling, online support groups, postpartum mood and anxiety resources, NICU parent resources, and general community resources. CSW spent time building rapport " with mother, and offered validation, support, and encouragement to mother throughout assessment. Mother was polite and appropriate, and denied having unmet needs or concerns at this time. CSW will remain available for psychosocial needs while infant is in the NICU. NAZARIO Gil.                  Continued Care and Services - Admitted Since 6/4/2023    Coordination has not been started for this encounter.       Expected Discharge Date and Time       Expected Discharge Date Expected Discharge Time    Jun 8, 2023            Demographic Summary       Row Name 06/08/23 1026       General Information    Admission Type inpatient    Arrived From home    Referral Source nursing    Reason for Consult other (see comments)    General Information Comments NICU admit                   Functional Status       Row Name 06/08/23 1026       Functional Status, IADL    Medications independent    Meal Preparation independent    Housekeeping independent    Laundry independent    Shopping independent       Mental Status    General Appearance WDL WDL       Mental Status Summary    Recent Changes in Mental Status/Cognitive Functioning no changes       Employment/    Employment Status employed part-time    Current or Previous Occupation healthcare    Employment/ Comments [employer]                   Psychosocial       Row Name 06/08/23 1027       Behavior WDL    Behavior WDL WDL       Emotion Mood WDL    Emotion/Mood/Affect WDL WDL       Speech WDL    Speech WDL WDL       Perceptual State WDL    Perceptual State WDL WDL       Thought Process WDL    Thought Process WDL WDL       Intellectual Performance WDL    Intellectual Performance WDL WDL       Coping/Stress    Major Change/Loss/Stressor birth    Patient Personal Strengths future/goal oriented;motivated;positive attitude;strong support system    Sources of Support other family members;parent(s);spouse                   Abuse/Neglect       Row Name 06/08/23 1027        Personal Safety    Feels Unsafe at Home or Work/School no    Feels Threatened by Someone no    Does Anyone Try to Keep You From Having Contact with Others or Doing Things Outside Your Home? no    Physical Signs of Abuse Present no                   Legal    No documentation.                  Substance Abuse    No documentation.                  Patient Forms    No documentation.                     GALINA Starr

## 2024-03-06 DIAGNOSIS — I83.812 VARICOSE VEINS OF LEFT LOWER EXTREMITY WITH PAIN: Primary | ICD-10-CM

## 2025-08-25 ENCOUNTER — OFFICE VISIT (OUTPATIENT)
Dept: OBSTETRICS AND GYNECOLOGY | Age: 40
End: 2025-08-25
Payer: COMMERCIAL

## 2025-08-25 DIAGNOSIS — O09.521 MULTIGRAVIDA OF ADVANCED MATERNAL AGE IN FIRST TRIMESTER: ICD-10-CM

## 2025-08-25 DIAGNOSIS — O36.80X0 ULTRASOUND SCAN TO CONFIRM FETAL VIABILITY WITH HISTORY OF MISCARRIAGE: Primary | ICD-10-CM

## 2025-08-25 DIAGNOSIS — Z87.59 ULTRASOUND SCAN TO CONFIRM FETAL VIABILITY WITH HISTORY OF MISCARRIAGE: Primary | ICD-10-CM

## 2025-08-25 DIAGNOSIS — O09.90 SUPERVISION OF HIGH RISK PREGNANCY, ANTEPARTUM: ICD-10-CM

## 2025-08-25 DIAGNOSIS — D25.1 INTRAMURAL UTERINE FIBROID: ICD-10-CM

## 2025-08-25 DIAGNOSIS — Z98.891 PREVIOUS CESAREAN SECTION: ICD-10-CM

## 2025-08-25 PROBLEM — O46.93 VAGINAL BLEEDING IN PREGNANCY, THIRD TRIMESTER: Status: RESOLVED | Noted: 2023-06-04 | Resolved: 2025-08-25

## 2025-08-25 PROBLEM — Z3A.08 8 WEEKS GESTATION OF PREGNANCY: Status: ACTIVE | Noted: 2023-06-04

## 2025-08-25 PROCEDURE — 0501F PRENATAL FLOW SHEET: CPT | Performed by: OBSTETRICS & GYNECOLOGY

## 2025-08-26 LAB
ABO GROUP BLD: NORMAL
BLD GP AB SCN SERPL QL: NEGATIVE
ERYTHROCYTE [DISTWIDTH] IN BLOOD BY AUTOMATED COUNT: 13.1 % (ref 12.3–15.4)
HBA1C MFR BLD: 5 % (ref 4.8–5.6)
HBV SURFACE AG SERPL QL IA: NEGATIVE
HCT VFR BLD AUTO: 41.5 % (ref 34–46.6)
HCV IGG SERPL QL IA: NON REACTIVE
HGB BLD-MCNC: 13.5 G/DL (ref 12–15.9)
HIV 1+2 AB+HIV1 P24 AG SERPL QL IA: NON REACTIVE
MCH RBC QN AUTO: 30.5 PG (ref 26.6–33)
MCHC RBC AUTO-ENTMCNC: 32.5 G/DL (ref 31.5–35.7)
MCV RBC AUTO: 93.7 FL (ref 79–97)
PLATELET # BLD AUTO: 289 10*3/MM3 (ref 140–450)
RBC # BLD AUTO: 4.43 10*6/MM3 (ref 3.77–5.28)
RH BLD: POSITIVE
RPR SER QL: NON REACTIVE
RUBV IGG SERPL IA-ACNC: 1.05 INDEX
VZV IGG SER QL IA: REACTIVE
WBC # BLD AUTO: 8.36 10*3/MM3 (ref 3.4–10.8)

## (undated) DEVICE — ANTIBACTERIAL UNDYED BRAIDED (POLYGLACTIN 910), SYNTHETIC ABSORBABLE SUTURE: Brand: COATED VICRYL

## (undated) DEVICE — SUT VIC 3/0 SH 27IN J416H

## (undated) DEVICE — GLV SURG BIOGEL LTX PF 7 1/2

## (undated) DEVICE — SOL IRR H2O BTL 1000ML STRL

## (undated) DEVICE — SUT VIC 0 CT1 36IN J946H

## (undated) DEVICE — NDL BLNT 18G 1 1/2IN

## (undated) DEVICE — SUT VIC 0 CTX 36IN J978H

## (undated) DEVICE — KENDALL SCD EXPRESS SLEEVES, KNEE LENGTH, MEDIUM: Brand: KENDALL SCD

## (undated) DEVICE — 3M™ MEDIPORE™ H SOFT CLOTH SURGICAL TAPE 2864, 4 INCH X 10 YARD (10CM X 9,14M), 12 ROLLS/CASE: Brand: 3M™ MEDIPORE™

## (undated) DEVICE — 3M(TM) TEGADERM(TM) TRANSPARENT FILM DRESSING FRAME STYLE 1627: Brand: 3M™ TEGADERM™

## (undated) DEVICE — SUT VIC 2/0 CT1 36IN

## (undated) DEVICE — SUT MNCRYL 0/0 CTX 36IN Y398H

## (undated) DEVICE — KT ART BLD GAS QUICK DRAW

## (undated) DEVICE — SUT MNCRYL PLS ANTIB UD 4/0 SH 27IN

## (undated) DEVICE — Device: Brand: PORTEX

## (undated) DEVICE — SUT MNCRYL 4/0 SH 27IN Y415H